# Patient Record
Sex: FEMALE | Race: OTHER | HISPANIC OR LATINO | URBAN - METROPOLITAN AREA
[De-identification: names, ages, dates, MRNs, and addresses within clinical notes are randomized per-mention and may not be internally consistent; named-entity substitution may affect disease eponyms.]

---

## 2019-04-17 ENCOUNTER — EMERGENCY (EMERGENCY)
Facility: HOSPITAL | Age: 44
LOS: 1 days | Discharge: HOME | End: 2019-04-17
Attending: EMERGENCY MEDICINE
Payer: SELF-PAY

## 2019-04-17 VITALS
DIASTOLIC BLOOD PRESSURE: 94 MMHG | HEART RATE: 76 BPM | OXYGEN SATURATION: 100 % | TEMPERATURE: 98 F | RESPIRATION RATE: 16 BRPM | SYSTOLIC BLOOD PRESSURE: 138 MMHG | WEIGHT: 119.05 LBS | HEIGHT: 60 IN

## 2019-04-17 PROCEDURE — 99053 MED SERV 10PM-8AM 24 HR FAC: CPT

## 2019-04-17 PROCEDURE — 99284 EMERGENCY DEPT VISIT MOD MDM: CPT | Mod: 25

## 2019-04-18 VITALS
OXYGEN SATURATION: 100 % | TEMPERATURE: 98 F | SYSTOLIC BLOOD PRESSURE: 126 MMHG | HEART RATE: 72 BPM | RESPIRATION RATE: 16 BRPM | DIASTOLIC BLOOD PRESSURE: 78 MMHG

## 2019-04-18 LAB
APPEARANCE UR: CLEAR — SIGNIFICANT CHANGE UP
BACTERIA # UR AUTO: NEGATIVE — SIGNIFICANT CHANGE UP
BILIRUB UR-MCNC: NEGATIVE — SIGNIFICANT CHANGE UP
C TRACH RRNA SPEC QL NAA+PROBE: SIGNIFICANT CHANGE UP
COLOR SPEC: SIGNIFICANT CHANGE UP
CULTURE RESULTS: NO GROWTH — SIGNIFICANT CHANGE UP
DIFF PNL FLD: NEGATIVE — SIGNIFICANT CHANGE UP
EPI CELLS # UR: 0 /HPF — SIGNIFICANT CHANGE UP
GLUCOSE UR QL: NEGATIVE — SIGNIFICANT CHANGE UP
HIV 1 & 2 AB SERPL IA.RAPID: SIGNIFICANT CHANGE UP
HYALINE CASTS # UR AUTO: 0 /LPF — SIGNIFICANT CHANGE UP (ref 0–2)
KETONES UR-MCNC: NEGATIVE — SIGNIFICANT CHANGE UP
LEUKOCYTE ESTERASE UR-ACNC: NEGATIVE — SIGNIFICANT CHANGE UP
N GONORRHOEA RRNA SPEC QL NAA+PROBE: SIGNIFICANT CHANGE UP
NITRITE UR-MCNC: NEGATIVE — SIGNIFICANT CHANGE UP
PH UR: 6 — SIGNIFICANT CHANGE UP (ref 5–8)
PROT UR-MCNC: NEGATIVE — SIGNIFICANT CHANGE UP
RBC CASTS # UR COMP ASSIST: 1 /HPF — SIGNIFICANT CHANGE UP (ref 0–4)
SP GR SPEC: 1.01 — SIGNIFICANT CHANGE UP (ref 1.01–1.02)
SPECIMEN SOURCE: SIGNIFICANT CHANGE UP
SPECIMEN SOURCE: SIGNIFICANT CHANGE UP
UROBILINOGEN FLD QL: NEGATIVE — SIGNIFICANT CHANGE UP
WBC UR QL: 0 /HPF — SIGNIFICANT CHANGE UP (ref 0–5)

## 2019-04-18 PROCEDURE — 81001 URINALYSIS AUTO W/SCOPE: CPT

## 2019-04-18 PROCEDURE — 87591 N.GONORRHOEAE DNA AMP PROB: CPT

## 2019-04-18 PROCEDURE — 87491 CHLMYD TRACH DNA AMP PROBE: CPT

## 2019-04-18 PROCEDURE — 99283 EMERGENCY DEPT VISIT LOW MDM: CPT

## 2019-04-18 PROCEDURE — 86703 HIV-1/HIV-2 1 RESULT ANTBDY: CPT

## 2019-04-18 PROCEDURE — 87086 URINE CULTURE/COLONY COUNT: CPT

## 2019-04-18 RX ORDER — FLUCONAZOLE 150 MG/1
150 TABLET ORAL ONCE
Qty: 0 | Refills: 0 | Status: COMPLETED | OUTPATIENT
Start: 2019-04-18 | End: 2019-04-18

## 2019-04-18 RX ADMIN — FLUCONAZOLE 150 MILLIGRAM(S): 150 TABLET ORAL at 00:52

## 2019-04-18 NOTE — ED PROVIDER NOTE - NSFOLLOWUPINSTRUCTIONS_ED_ALL_ED_FT
Refrain from sexual intercourse until your symptoms resolve. Follow up with your primary doctor within 24- 48 hours. Return to the ER promptly for any urgent concerns.

## 2019-04-18 NOTE — ED PROVIDER NOTE - CLINICAL SUMMARY MEDICAL DECISION MAKING FREE TEXT BOX
Attending Jr Corrales DO: 44 yo female presents with persistent vaginal itching for past few weeks. Seen at OSH and given diflucan, monospot and flagyl without relief. +sexually active with more than 1 male partner. Agrees to HIV testing. Will obtain ua, STD testing, likely treat empirically, dc home with outpatient follow up. Attending Jr Corrales DO: 42 yo female presents with persistent vaginal itching for past few weeks. Seen at OSH and given monospot and flagyl without relief. +sexually active with more than 1 male partner. Agrees to HIV testing. Will obtain ua, STD testing, likely treat empirically, dc home with outpatient follow up.

## 2019-04-18 NOTE — ED PROVIDER NOTE - OBJECTIVE STATEMENT
Emile Marmolejo (MD) : 42 y/o F pt with no significant PMHx c/o worsening vaginal itching and white discharge x3 weeks. Tried using Monistat OTC with no relief. Went to emergency room last week and was Rx'd metronidazole with no relief. Pt had 2-3 sexual partners in the past year. Denies fever, chills, dysuria, nausea, vomiting, hx of STD or any other complaints.  PMD: Dr. Gregory ([place])

## 2019-04-18 NOTE — ED PROVIDER NOTE - ATTENDING CONTRIBUTION TO CARE
I performed a history and physical exam of the patient and discussed their management with the resident. I reviewed the resident's note and agree with the documented findings and plan of care, except if noted below. My medical decision making and observations can be found be found below.    See MDM

## 2019-04-18 NOTE — ED ADULT NURSE NOTE - OBJECTIVE STATEMENT
Pt is a 44 yo female c/o vaginal discharge & itching for a few days now. She states that she was using OTC monistat for yeast infection to no avail.

## 2019-05-05 ENCOUNTER — EMERGENCY (EMERGENCY)
Facility: HOSPITAL | Age: 44
LOS: 1 days | Discharge: HOME | End: 2019-05-05
Attending: EMERGENCY MEDICINE
Payer: SELF-PAY

## 2019-05-05 VITALS
RESPIRATION RATE: 18 BRPM | TEMPERATURE: 98 F | OXYGEN SATURATION: 100 % | SYSTOLIC BLOOD PRESSURE: 123 MMHG | HEIGHT: 60 IN | DIASTOLIC BLOOD PRESSURE: 82 MMHG | HEART RATE: 83 BPM | WEIGHT: 119.93 LBS

## 2019-05-05 VITALS
SYSTOLIC BLOOD PRESSURE: 115 MMHG | TEMPERATURE: 97 F | RESPIRATION RATE: 18 BRPM | DIASTOLIC BLOOD PRESSURE: 78 MMHG | OXYGEN SATURATION: 100 % | HEART RATE: 70 BPM

## 2019-05-05 LAB
ALBUMIN SERPL ELPH-MCNC: 3.9 G/DL — SIGNIFICANT CHANGE UP (ref 3.3–5)
ALP SERPL-CCNC: 60 U/L — SIGNIFICANT CHANGE UP (ref 40–120)
ALT FLD-CCNC: 11 U/L — SIGNIFICANT CHANGE UP (ref 10–45)
ANION GAP SERPL CALC-SCNC: 13 MMOL/L — SIGNIFICANT CHANGE UP (ref 5–17)
APPEARANCE UR: CLEAR — SIGNIFICANT CHANGE UP
AST SERPL-CCNC: 29 U/L — SIGNIFICANT CHANGE UP (ref 10–40)
BACTERIA # UR AUTO: NEGATIVE — SIGNIFICANT CHANGE UP
BASE EXCESS BLDV CALC-SCNC: 0.9 MMOL/L — SIGNIFICANT CHANGE UP (ref -2–2)
BASOPHILS # BLD AUTO: 0.1 K/UL — SIGNIFICANT CHANGE UP (ref 0–0.2)
BASOPHILS NFR BLD AUTO: 0.8 % — SIGNIFICANT CHANGE UP (ref 0–2)
BILIRUB SERPL-MCNC: 0.3 MG/DL — SIGNIFICANT CHANGE UP (ref 0.2–1.2)
BILIRUB UR-MCNC: NEGATIVE — SIGNIFICANT CHANGE UP
BUN SERPL-MCNC: 14 MG/DL — SIGNIFICANT CHANGE UP (ref 7–23)
CA-I SERPL-SCNC: 1.21 MMOL/L — SIGNIFICANT CHANGE UP (ref 1.12–1.3)
CALCIUM SERPL-MCNC: 9 MG/DL — SIGNIFICANT CHANGE UP (ref 8.4–10.5)
CHLORIDE BLDV-SCNC: 108 MMOL/L — SIGNIFICANT CHANGE UP (ref 96–108)
CHLORIDE SERPL-SCNC: 105 MMOL/L — SIGNIFICANT CHANGE UP (ref 96–108)
CO2 BLDV-SCNC: 26 MMOL/L — SIGNIFICANT CHANGE UP (ref 22–30)
CO2 SERPL-SCNC: 21 MMOL/L — LOW (ref 22–31)
COLOR SPEC: SIGNIFICANT CHANGE UP
CREAT SERPL-MCNC: 0.37 MG/DL — LOW (ref 0.5–1.3)
DIFF PNL FLD: NEGATIVE — SIGNIFICANT CHANGE UP
EOSINOPHIL # BLD AUTO: 0.7 K/UL — HIGH (ref 0–0.5)
EOSINOPHIL NFR BLD AUTO: 8.3 % — HIGH (ref 0–6)
EPI CELLS # UR: 0 /HPF — SIGNIFICANT CHANGE UP
GAS PNL BLDV: 133 MMOL/L — LOW (ref 136–145)
GAS PNL BLDV: SIGNIFICANT CHANGE UP
GAS PNL BLDV: SIGNIFICANT CHANGE UP
GLUCOSE BLDV-MCNC: 97 MG/DL — SIGNIFICANT CHANGE UP (ref 70–99)
GLUCOSE SERPL-MCNC: 99 MG/DL — SIGNIFICANT CHANGE UP (ref 70–99)
GLUCOSE UR QL: NEGATIVE — SIGNIFICANT CHANGE UP
HCO3 BLDV-SCNC: 25 MMOL/L — SIGNIFICANT CHANGE UP (ref 21–29)
HCT VFR BLD CALC: 37.9 % — SIGNIFICANT CHANGE UP (ref 34.5–45)
HCT VFR BLDA CALC: 39 % — SIGNIFICANT CHANGE UP (ref 39–50)
HGB BLD CALC-MCNC: 12.6 G/DL — SIGNIFICANT CHANGE UP (ref 11.5–15.5)
HGB BLD-MCNC: 13.1 G/DL — SIGNIFICANT CHANGE UP (ref 11.5–15.5)
HYALINE CASTS # UR AUTO: 0 /LPF — SIGNIFICANT CHANGE UP (ref 0–2)
KETONES UR-MCNC: NEGATIVE — SIGNIFICANT CHANGE UP
LACTATE BLDV-MCNC: 1.5 MMOL/L — SIGNIFICANT CHANGE UP (ref 0.7–2)
LEUKOCYTE ESTERASE UR-ACNC: NEGATIVE — SIGNIFICANT CHANGE UP
LIDOCAIN IGE QN: 47 U/L — SIGNIFICANT CHANGE UP (ref 7–60)
LYMPHOCYTES # BLD AUTO: 2.7 K/UL — SIGNIFICANT CHANGE UP (ref 1–3.3)
LYMPHOCYTES # BLD AUTO: 33.1 % — SIGNIFICANT CHANGE UP (ref 13–44)
MCHC RBC-ENTMCNC: 28.7 PG — SIGNIFICANT CHANGE UP (ref 27–34)
MCHC RBC-ENTMCNC: 34.5 GM/DL — SIGNIFICANT CHANGE UP (ref 32–36)
MCV RBC AUTO: 83.2 FL — SIGNIFICANT CHANGE UP (ref 80–100)
MONOCYTES # BLD AUTO: 0.5 K/UL — SIGNIFICANT CHANGE UP (ref 0–0.9)
MONOCYTES NFR BLD AUTO: 6.7 % — SIGNIFICANT CHANGE UP (ref 2–14)
NEUTROPHILS # BLD AUTO: 4.1 K/UL — SIGNIFICANT CHANGE UP (ref 1.8–7.4)
NEUTROPHILS NFR BLD AUTO: 51.1 % — SIGNIFICANT CHANGE UP (ref 43–77)
NITRITE UR-MCNC: NEGATIVE — SIGNIFICANT CHANGE UP
PCO2 BLDV: 40 MMHG — SIGNIFICANT CHANGE UP (ref 35–50)
PH BLDV: 7.42 — SIGNIFICANT CHANGE UP (ref 7.35–7.45)
PH UR: 6.5 — SIGNIFICANT CHANGE UP (ref 5–8)
PLATELET # BLD AUTO: 284 K/UL — SIGNIFICANT CHANGE UP (ref 150–400)
PO2 BLDV: 47 MMHG — HIGH (ref 25–45)
POTASSIUM BLDV-SCNC: 3.5 MMOL/L — SIGNIFICANT CHANGE UP (ref 3.5–5.3)
POTASSIUM SERPL-MCNC: 4.5 MMOL/L — SIGNIFICANT CHANGE UP (ref 3.5–5.3)
POTASSIUM SERPL-SCNC: 4.5 MMOL/L — SIGNIFICANT CHANGE UP (ref 3.5–5.3)
PROT SERPL-MCNC: 7.1 G/DL — SIGNIFICANT CHANGE UP (ref 6–8.3)
PROT UR-MCNC: NEGATIVE — SIGNIFICANT CHANGE UP
RBC # BLD: 4.56 M/UL — SIGNIFICANT CHANGE UP (ref 3.8–5.2)
RBC # FLD: 13.5 % — SIGNIFICANT CHANGE UP (ref 10.3–14.5)
RBC CASTS # UR COMP ASSIST: 1 /HPF — SIGNIFICANT CHANGE UP (ref 0–4)
SAO2 % BLDV: 79 % — SIGNIFICANT CHANGE UP (ref 67–88)
SODIUM SERPL-SCNC: 139 MMOL/L — SIGNIFICANT CHANGE UP (ref 135–145)
SP GR SPEC: 1.02 — SIGNIFICANT CHANGE UP (ref 1.01–1.02)
UROBILINOGEN FLD QL: NEGATIVE — SIGNIFICANT CHANGE UP
WBC # BLD: 8 K/UL — SIGNIFICANT CHANGE UP (ref 3.8–10.5)
WBC # FLD AUTO: 8 K/UL — SIGNIFICANT CHANGE UP (ref 3.8–10.5)
WBC UR QL: 0 /HPF — SIGNIFICANT CHANGE UP (ref 0–5)

## 2019-05-05 PROCEDURE — 83690 ASSAY OF LIPASE: CPT

## 2019-05-05 PROCEDURE — 76830 TRANSVAGINAL US NON-OB: CPT | Mod: 26

## 2019-05-05 PROCEDURE — 93975 VASCULAR STUDY: CPT

## 2019-05-05 PROCEDURE — 82330 ASSAY OF CALCIUM: CPT

## 2019-05-05 PROCEDURE — 85027 COMPLETE CBC AUTOMATED: CPT

## 2019-05-05 PROCEDURE — 87086 URINE CULTURE/COLONY COUNT: CPT

## 2019-05-05 PROCEDURE — 82803 BLOOD GASES ANY COMBINATION: CPT

## 2019-05-05 PROCEDURE — 83605 ASSAY OF LACTIC ACID: CPT

## 2019-05-05 PROCEDURE — 99284 EMERGENCY DEPT VISIT MOD MDM: CPT | Mod: 25

## 2019-05-05 PROCEDURE — 93975 VASCULAR STUDY: CPT | Mod: 26

## 2019-05-05 PROCEDURE — 76705 ECHO EXAM OF ABDOMEN: CPT | Mod: 26

## 2019-05-05 PROCEDURE — 99284 EMERGENCY DEPT VISIT MOD MDM: CPT

## 2019-05-05 PROCEDURE — 76705 ECHO EXAM OF ABDOMEN: CPT

## 2019-05-05 PROCEDURE — 84295 ASSAY OF SERUM SODIUM: CPT

## 2019-05-05 PROCEDURE — 85014 HEMATOCRIT: CPT

## 2019-05-05 PROCEDURE — 82947 ASSAY GLUCOSE BLOOD QUANT: CPT

## 2019-05-05 PROCEDURE — 82435 ASSAY OF BLOOD CHLORIDE: CPT

## 2019-05-05 PROCEDURE — 80053 COMPREHEN METABOLIC PANEL: CPT

## 2019-05-05 PROCEDURE — 81001 URINALYSIS AUTO W/SCOPE: CPT

## 2019-05-05 PROCEDURE — 76830 TRANSVAGINAL US NON-OB: CPT

## 2019-05-05 PROCEDURE — 84132 ASSAY OF SERUM POTASSIUM: CPT

## 2019-05-05 RX ORDER — FLUCONAZOLE 150 MG/1
1 TABLET ORAL
Qty: 1 | Refills: 0
Start: 2019-05-05 | End: 2019-05-05

## 2019-05-05 RX ORDER — FLUCONAZOLE 150 MG/1
150 TABLET ORAL ONCE
Qty: 0 | Refills: 0 | Status: COMPLETED | OUTPATIENT
Start: 2019-05-05 | End: 2019-05-05

## 2019-05-05 RX ORDER — ACETAMINOPHEN 500 MG
650 TABLET ORAL ONCE
Qty: 0 | Refills: 0 | Status: COMPLETED | OUTPATIENT
Start: 2019-05-05 | End: 2019-05-05

## 2019-05-05 RX ADMIN — Medication 650 MILLIGRAM(S): at 19:57

## 2019-05-05 RX ADMIN — FLUCONAZOLE 150 MILLIGRAM(S): 150 TABLET ORAL at 19:57

## 2019-05-05 NOTE — ED PROVIDER NOTE - PROGRESS NOTE DETAILS
pelvic exam performed with chaperone RN Soo - white, cottage cheese - like discharge, no CMT, no adnexal tenderness.  - Seema Gallagher DO Pt feels improved pain.  Abdomen is soft and nontender on reassessment.  Pt offered a CT scan but declines at this time - she has been advised to follow up with her GYN and ot return to the ER immediately with any new or worsening symptoms.  Pt understands the ultrasound results and knows about the cyst on her left ovary. She understand that all the organs in the abdomen were not assess with the ultrasound and we cannot rule out all abdominal pathology without CT, although we believe it is unlikely that she has acute pathology due to nontender abdomen.  Pt will be sent home with PMD and GYN follow up.  - Seema Gallagher DO Attending Arlene Arredondo: pain resolved in the ed. on repeat exam abd soft and nontender. d/w pt resutls of ultrasounds. pt has appt with gyn. pt instructed to return for any worsneing pain. d/w pt obtaining ct scan as initially with right sided pain. as pain free currently and negative mcburrneys ttp less likely appendicitis. will return for any worsening pain

## 2019-05-05 NOTE — ED PROVIDER NOTE - NSFOLLOWUPINSTRUCTIONS_ED_ALL_ED_FT
- Take Tylenol 650mg every 6 hrs as needed for pain.   - Please return to the ER immediately with any new or worsening pain or any other concerning symptoms (such as fevers, nausea/vomiting, diarrhea)  - A prescription has been sent to your pharmacy - please take as directed   - Please follow up with your Primary doctor and with your GYN as scheduled.  All of your results have been included to share with your doctors.

## 2019-05-05 NOTE — ED ADULT NURSE REASSESSMENT NOTE - NS ED NURSE REASSESS COMMENT FT1
Pt received from BROOKE Mcguire in [place]. Pt at US at this time. Pt received from RN Shayla in [place]. Pt Aox3 breathing even unlabored spontaneously MD Elliott GRADY at bedside performing vaginal exam at this time.

## 2019-05-05 NOTE — ED PROVIDER NOTE - PHYSICAL EXAMINATION
Attending Arlene Arredondo: Gen: NAD, heent: atrauamtic, eomi, perrla, mmm, op pink, uvula midline, neck; nttp, no nuchal rigidity, chest: nttp, no crepitus, cv: rrr, no murmurs, lungs: ctab, abd: soft, ttp umbilical and suprapubic area, +BS, no guarding, ext: wwp, neg homans, skin: no rash, neuro: awake and alert, following commands, speech clear, sensation and strength intact, no focal deficits

## 2019-05-05 NOTE — ED PROVIDER NOTE - CLINICAL SUMMARY MEDICAL DECISION MAKING FREE TEXT BOX
TVUS, US abd, labs, urine, urine preg Attending Arlene Arredondo: 42 y/o female presenting with vaginal discharge and lower abdominal pain. pt states has been having vaginal discharge diagnosed as yeast infection. has been treated twice without improvement. no fevers or chills. no known h/o immunosuppression. upon arrival abd with mild ttp. u/s performed showing ne evidence of ovarain cyst or fibroid. on resident exam pt ith active yeast infection. unclear etiology of persistent infeciton. no evidence of sig hyperglycemia or known h/o DM. on serial exams abd soft. pt instructed to follow up o/p with GYN and pcp, has appt next few days. instructed to return for any worsening pain

## 2019-05-05 NOTE — ED PROVIDER NOTE - ATTENDING CONTRIBUTION TO CARE
Attending MD Arlene Arredondo:  I personally have seen and examined this patient.  Resident note reviewed and agree on plan of care and except where noted.  See HPI, PE, and MDM for details.

## 2019-05-05 NOTE — ED PROVIDER NOTE - OBJECTIVE STATEMENT
44 yo female with PMH of yeast infection s/p monostat, presents to the ED for abd pain, and persistent vaginal itching and discharge. Pt has an appt with GYN Griselda 3 but states the symptoms were bothering her so she came back. Also previously was on metronidazole without relief. Denies fever, chills, dysuria, flank pain, hematuria, melena, hematochezia, rectal pain, rash, joint pain, headache, CP, SOB, palpitations. Denies h/o previous STI. Previous chlamydia/ gonorrhea negatvie 4/18 in this ED. Pt endorses that since d/c she has not tried probiotics, also denies other abx or monost since last visit to [facility] ED.

## 2019-05-06 LAB
CULTURE RESULTS: SIGNIFICANT CHANGE UP
SPECIMEN SOURCE: SIGNIFICANT CHANGE UP

## 2020-06-26 ENCOUNTER — EMERGENCY (EMERGENCY)
Facility: HOSPITAL | Age: 45
LOS: 1 days | Discharge: HOME | End: 2020-06-26
Attending: EMERGENCY MEDICINE
Payer: MEDICAID

## 2020-06-26 VITALS
HEART RATE: 72 BPM | DIASTOLIC BLOOD PRESSURE: 78 MMHG | SYSTOLIC BLOOD PRESSURE: 102 MMHG | TEMPERATURE: 99 F | RESPIRATION RATE: 16 BRPM | OXYGEN SATURATION: 99 %

## 2020-06-26 VITALS
SYSTOLIC BLOOD PRESSURE: 117 MMHG | RESPIRATION RATE: 18 BRPM | TEMPERATURE: 98 F | OXYGEN SATURATION: 98 % | HEIGHT: 60 IN | HEART RATE: 91 BPM | DIASTOLIC BLOOD PRESSURE: 73 MMHG | WEIGHT: 125 LBS

## 2020-06-26 LAB
APPEARANCE UR: CLEAR — SIGNIFICANT CHANGE UP
BILIRUB UR-MCNC: NEGATIVE — SIGNIFICANT CHANGE UP
COLOR SPEC: YELLOW — SIGNIFICANT CHANGE UP
DIFF PNL FLD: NEGATIVE — SIGNIFICANT CHANGE UP
GLUCOSE UR QL: NEGATIVE — SIGNIFICANT CHANGE UP
HCG UR QL: NEGATIVE — SIGNIFICANT CHANGE UP
KETONES UR-MCNC: NEGATIVE — SIGNIFICANT CHANGE UP
LEUKOCYTE ESTERASE UR-ACNC: NEGATIVE — SIGNIFICANT CHANGE UP
NITRITE UR-MCNC: NEGATIVE — SIGNIFICANT CHANGE UP
PH UR: 5.5 — SIGNIFICANT CHANGE UP (ref 5–8)
PROT UR-MCNC: SIGNIFICANT CHANGE UP
SP GR SPEC: 1.03 — HIGH (ref 1.01–1.02)
UROBILINOGEN FLD QL: NEGATIVE — SIGNIFICANT CHANGE UP

## 2020-06-26 PROCEDURE — 82962 GLUCOSE BLOOD TEST: CPT

## 2020-06-26 PROCEDURE — 87491 CHLMYD TRACH DNA AMP PROBE: CPT

## 2020-06-26 PROCEDURE — 99284 EMERGENCY DEPT VISIT MOD MDM: CPT

## 2020-06-26 PROCEDURE — 87086 URINE CULTURE/COLONY COUNT: CPT

## 2020-06-26 PROCEDURE — 87591 N.GONORRHOEAE DNA AMP PROB: CPT

## 2020-06-26 PROCEDURE — 81025 URINE PREGNANCY TEST: CPT

## 2020-06-26 PROCEDURE — 81003 URINALYSIS AUTO W/O SCOPE: CPT

## 2020-06-26 RX ORDER — FLUCONAZOLE 150 MG/1
150 TABLET ORAL ONCE
Refills: 0 | Status: COMPLETED | OUTPATIENT
Start: 2020-06-26 | End: 2020-06-26

## 2020-06-26 RX ADMIN — FLUCONAZOLE 150 MILLIGRAM(S): 150 TABLET ORAL at 19:15

## 2020-06-26 NOTE — ED PROVIDER NOTE - NEURO NEGATIVE STATEMENT, MLM
no loss of consciousness, no gait abnormality, no headache, no sensory deficits, and no weakness.
detailed exam

## 2020-06-26 NOTE — ED PROVIDER NOTE - PATIENT PORTAL LINK FT
You can access the FollowMyHealth Patient Portal offered by [facility] by registering at the following website: [email or web address]. By joining [employer]’s FollowMyHealth portal, you will also be able to view your health information using other applications (apps) compatible with our system.

## 2020-06-26 NOTE — ED PROVIDER NOTE - NSFOLLOWUPINSTRUCTIONS_ED_ALL_ED_FT
rest and hydration. Take Fluconazole pill tomorrow. start clotrimazole vaginally tomorrow and for the next three days at bedtime. Follow up with the OBGYN clinic. Return to the ER Immediately for worsening pain, fever, vomiting or weakness.

## 2020-06-26 NOTE — ED ADULT NURSE NOTE - OBJECTIVE STATEMENT
46 y/o female PMHx yeast infection arrives to [facility] ED by with c/o vaginal pain and itching. States symptoms started about 1 month ago with itching and pain, now experiencing 10/10 pain and swelling. Used Monistat 1 day when began feeling symptomatic and no relief. Came in today due to pain and itchiness. Patient is A&Ox4. respirations are spontaneous and unlabored, breath sounds clear B/L, no chest pain, palpitations or SOB. Peripheral pulses 2+. lower bdominal pain, soft NT/ND. No n/v/d. Last BM today. LMP 5/16. Denies urinary symptoms. Denies fever/chills. No sick contacts. Skin is warm/dry and normal for race. Safety maintained.

## 2020-06-26 NOTE — ED PROVIDER NOTE - OBJECTIVE STATEMENT
44 yo F with no pertinent pmhx presenting with worsening vaginal itching. Patient states it has been going on for over one month. Patient states she first tried Monistat and then was seen here in the beginning of the month. Patient states she was given pills here with no relief. Patient states she didn't follow up with OBGYN because she doesn't have insurance. Patient states she is having vaginal itching, white cottage cheese like discharge, and swelling. Patient denies fever, abd pain, cough, nvd, dysuria, hematuria, vaginal bleeding, flank pain, and back pain.

## 2020-06-26 NOTE — ED PROVIDER NOTE - NSFOLLOWUPCLINICS_GEN_ALL_ED_FT
[facility] Gynecology and Obstetrics  Gynceology/OB  [address] [place] [address]  Phone: [phone]  Fax:   Follow Up Time: 1-3 Days

## 2020-06-26 NOTE — ED PROVIDER NOTE - ATTENDING CONTRIBUTION TO CARE
Pt with cottage cheese vaginal discharge.  No pain, fever, ab pain.  Appears well, nontoxic.  Will r/o severe diabetes, otherwise sxs treatment with diflucan and cream and outpt gyn referral.

## 2020-06-26 NOTE — ED ADULT NURSE NOTE - NSIMPLEMENTINTERV_GEN_ALL_ED
Implemented All Universal Safety Interventions:  [place] to call system. Call bell, personal items and telephone within reach. Instruct patient to call for assistance. Room bathroom lighting operational. Non-slip footwear when patient is off stretcher. Physically safe environment: no spills, clutter or unnecessary equipment. Stretcher in lowest position, wheels locked, appropriate side rails in place.

## 2020-06-26 NOTE — ED PROVIDER NOTE - CLINICAL SUMMARY MEDICAL DECISION MAKING FREE TEXT BOX
44 yo F with no pertinent pmhx presenting with worsening vaginal itching. Cottage cheese like discharge on exam. No history of DM. No CMT. Will obtain urine and fingerstick and treat for vaginitis

## 2020-06-27 LAB
C TRACH RRNA SPEC QL NAA+PROBE: SIGNIFICANT CHANGE UP
N GONORRHOEA RRNA SPEC QL NAA+PROBE: SIGNIFICANT CHANGE UP
SPECIMEN SOURCE: SIGNIFICANT CHANGE UP

## 2020-06-27 RX ORDER — FLUCONAZOLE 150 MG/1
1 TABLET ORAL
Qty: 1 | Refills: 0
Start: 2020-06-27 | End: 2020-06-27

## 2020-06-28 LAB
CULTURE RESULTS: SIGNIFICANT CHANGE UP
SPECIMEN SOURCE: SIGNIFICANT CHANGE UP

## 2020-07-07 ENCOUNTER — OUTPATIENT (OUTPATIENT)
Dept: OUTPATIENT SERVICES | Facility: HOSPITAL | Age: 45
LOS: 1 days | End: 2020-07-07
Payer: SELF-PAY

## 2020-07-07 ENCOUNTER — APPOINTMENT (OUTPATIENT)
Dept: OBGYN | Facility: CLINIC | Age: 45
End: 2020-07-07
Payer: SELF-PAY

## 2020-07-07 VITALS
WEIGHT: 126 LBS | BODY MASS INDEX: 20.99 KG/M2 | DIASTOLIC BLOOD PRESSURE: 80 MMHG | HEIGHT: 65 IN | SYSTOLIC BLOOD PRESSURE: 120 MMHG

## 2020-07-07 DIAGNOSIS — B95.1 STREPTOCOCCUS, GROUP B, AS THE CAUSE OF DISEASES CLASSIFIED ELSEWHERE: ICD-10-CM

## 2020-07-07 DIAGNOSIS — Z86.19 PERSONAL HISTORY OF OTHER INFECTIOUS AND PARASITIC DISEASES: ICD-10-CM

## 2020-07-07 DIAGNOSIS — O09.899 SUPERVISION OF OTHER HIGH RISK PREGNANCIES, UNSPECIFIED TRIMESTER: ICD-10-CM

## 2020-07-07 PROCEDURE — 99203 OFFICE O/P NEW LOW 30 MIN: CPT | Mod: NC

## 2020-07-07 PROCEDURE — G0463: CPT

## 2020-07-09 NOTE — REVIEW OF SYSTEMS
[Abdominal Pain] : abdominal pain [Pelvic Pain] : pelvic pain [Fever] : no fever [Chills] : no chills [Dyspnea] : no shortness of breath [Chest Pain] : no chest pain [Cough] : no cough [Dysuria] : no dysuria [Abn Vag Bleeding] : no abnormal vaginal bleeding [Incontinence] : no incontinence [Frequency] : no frequency [Urgency] : no urgency

## 2020-07-09 NOTE — PHYSICAL EXAM
[Awake] : awake [Alert] : alert [Soft] : soft [Oriented x3] : oriented to person, place, and time [No Lesions] : no genitalia lesions [Normal] : uterus [Pink Rugae] : pink rugae [No Bleeding] : there was no active vaginal bleeding [Uterine Adnexae] : were not tender and not enlarged [Acute Distress] : no acute distress [Tender] : non tender [FreeTextEntry6] : Normal uterus contour no masses felt in the adnexa [Discharge] : had no discharge

## 2020-07-09 NOTE — END OF VISIT
[] : Resident [FreeTextEntry3] : Agree with above, patient was seen and examined by me with the resident

## 2020-07-15 DIAGNOSIS — N76.0 ACUTE VAGINITIS: ICD-10-CM

## 2020-07-15 DIAGNOSIS — A49.1 STREPTOCOCCAL INFECTION, UNSPECIFIED SITE: ICD-10-CM

## 2020-07-21 ENCOUNTER — OUTPATIENT (OUTPATIENT)
Dept: OUTPATIENT SERVICES | Facility: HOSPITAL | Age: 45
LOS: 1 days | End: 2020-07-21
Payer: SELF-PAY

## 2020-07-21 ENCOUNTER — LABORATORY RESULT (OUTPATIENT)
Age: 45
End: 2020-07-21

## 2020-07-21 ENCOUNTER — APPOINTMENT (OUTPATIENT)
Dept: OBGYN | Facility: CLINIC | Age: 45
End: 2020-07-21
Payer: MEDICAID

## 2020-07-21 DIAGNOSIS — N76.0 ACUTE VAGINITIS: ICD-10-CM

## 2020-07-21 DIAGNOSIS — Z01.419 ENCOUNTER FOR GYNECOLOGICAL EXAMINATION (GENERAL) (ROUTINE) W/OUT ABNORMAL FINDINGS: ICD-10-CM

## 2020-07-21 DIAGNOSIS — A49.1 STREPTOCOCCAL INFECTION, UNSPECIFIED SITE: ICD-10-CM

## 2020-07-21 PROCEDURE — 87624 HPV HI-RISK TYP POOLED RSLT: CPT

## 2020-07-21 PROCEDURE — 81003 URINALYSIS AUTO W/O SCOPE: CPT

## 2020-07-21 PROCEDURE — 87591 N.GONORRHOEAE DNA AMP PROB: CPT

## 2020-07-21 PROCEDURE — G0463: CPT

## 2020-07-21 PROCEDURE — 99396 PREV VISIT EST AGE 40-64: CPT | Mod: NC

## 2020-07-21 PROCEDURE — 87491 CHLMYD TRACH DNA AMP PROBE: CPT

## 2020-07-21 NOTE — PHYSICAL EXAM
[Awake] : awake [LAD] : no lymphadenopathy [Acute Distress] : no acute distress [Alert] : alert [Mass] : no breast mass [Goiter] : no goiter [Thyroid Nodule] : no thyroid nodule [Axillary LAD] : no axillary lymphadenopathy [Nipple Discharge] : no nipple discharge [Oriented x3] : oriented to person, place, and time [Tender] : non tender [Soft] : soft [No Bleeding] : there was no active vaginal bleeding [Normal] : cervix [Uterine Adnexae] : were not tender and not enlarged

## 2020-07-21 NOTE — HISTORY OF PRESENT ILLNESS
[Good] : being in good health [6 Months Ago] : 6 months ago [Healthy Diet] : a healthy diet [Reproductive Age] : is of reproductive age [Regular Exercise] : regular exercise [Monogamous] : is monogamous [Sexually Active] : is sexually active [Definite:  ___ (Date)] : the last menstrual period was [unfilled]

## 2020-07-22 LAB
C TRACH RRNA SPEC QL NAA+PROBE: SIGNIFICANT CHANGE UP
HPV HIGH+LOW RISK DNA PNL CVX: SIGNIFICANT CHANGE UP
N GONORRHOEA RRNA SPEC QL NAA+PROBE: SIGNIFICANT CHANGE UP
SPECIMEN SOURCE: SIGNIFICANT CHANGE UP

## 2020-07-24 LAB — CYTOLOGY SPEC DOC CYTO: SIGNIFICANT CHANGE UP

## 2020-08-05 DIAGNOSIS — Z01.419 ENCOUNTER FOR GYNECOLOGICAL EXAMINATION (GENERAL) (ROUTINE) WITHOUT ABNORMAL FINDINGS: ICD-10-CM

## 2020-11-18 NOTE — ED ADULT NURSE NOTE - NSSUSCREENINGQ1_ED_ALL_ED
FAMILY HISTORY:  FH: aortic stenosis, Father  FH: CHF (congestive heart failure), Grandfather  FHx: myocardial infarction, Mother     No

## 2020-12-23 PROBLEM — Z86.19 HISTORY OF CANDIDIASIS OF VAGINA: Status: RESOLVED | Noted: 2020-07-07 | Resolved: 2020-12-23

## 2020-12-23 PROBLEM — Z01.419 ENCOUNTER FOR CERVICAL PAP SMEAR WITH PELVIC EXAM: Status: RESOLVED | Noted: 2020-07-21 | Resolved: 2020-12-23

## 2021-01-11 ENCOUNTER — OUTPATIENT (OUTPATIENT)
Dept: OUTPATIENT SERVICES | Facility: HOSPITAL | Age: 46
LOS: 1 days | End: 2021-01-11
Payer: SELF-PAY

## 2021-01-11 ENCOUNTER — APPOINTMENT (OUTPATIENT)
Dept: OBGYN | Facility: CLINIC | Age: 46
End: 2021-01-11
Payer: COMMERCIAL

## 2021-01-11 ENCOUNTER — RESULT CHARGE (OUTPATIENT)
Age: 46
End: 2021-01-11

## 2021-01-11 VITALS — SYSTOLIC BLOOD PRESSURE: 112 MMHG | DIASTOLIC BLOOD PRESSURE: 78 MMHG | BODY MASS INDEX: 21.13 KG/M2 | WEIGHT: 127 LBS

## 2021-01-11 VITALS — TEMPERATURE: 97.7 F

## 2021-01-11 DIAGNOSIS — B37.3 CANDIDIASIS OF VULVA AND VAGINA: ICD-10-CM

## 2021-01-11 DIAGNOSIS — N76.0 ACUTE VAGINITIS: ICD-10-CM

## 2021-01-11 PROCEDURE — 81025 URINE PREGNANCY TEST: CPT

## 2021-01-11 PROCEDURE — 99213 OFFICE O/P EST LOW 20 MIN: CPT | Mod: NC

## 2021-01-11 PROCEDURE — G0463: CPT

## 2021-01-11 RX ORDER — AMPICILLIN 500 MG/1
500 CAPSULE ORAL 4 TIMES DAILY
Qty: 28 | Refills: 0 | Status: COMPLETED | COMMUNITY
Start: 2020-07-09 | End: 2021-01-11

## 2021-01-11 RX ORDER — AMPICILLIN 500 MG/1
500 CAPSULE ORAL 4 TIMES DAILY
Qty: 28 | Refills: 0 | Status: COMPLETED | OUTPATIENT
Start: 2020-07-07 | End: 2021-01-11

## 2021-01-12 LAB — HCG UR QL: NEGATIVE

## 2021-01-13 NOTE — PHYSICAL EXAM
[Labia Majora] : normal [Labia Minora] : normal [Moderate] : There was moderate vaginal bleeding [Normal] : normal [Uterine Adnexae] : normal [FreeTextEntry1] : +erythema, scratching excoriations noted.  no lesions, no vesicles.

## 2021-01-13 NOTE — DISCUSSION/SUMMARY
[FreeTextEntry1] : 44yo _ yeast vaginitis\par - recurrence vs- never adequately tx\par - diflucan rx x 3 , can continue with lotrisone externally.  Discouraged use of vagisil. \par - irreg bleeding this month- preg test neg.  Will keep menstrual diary- if irreg bleeding persists, will rtc for evaluation.  Has never had irreg cycle in past. \par \par KATHRIN Lakhani  \par \par

## 2021-01-13 NOTE — HISTORY OF PRESENT ILLNESS
[FreeTextEntry1] : 44yo G2 (LMP 1/11/2021) presents complaining of recurrent yeast infection sx.  Pt was only tx with external lotrisone 12/2020 while concurrently tx with Amox.  Pt states itching and labial swelling returned/ worse last week with thick white d/c.  Attempted monistat with minimal relief, then used vagisil which caused burning.\par Denies blisters/ burning.  Reports irreg menses this month-  current menses only 2 weeks after LMP.   Prior reports reg q 28 day cycles.  Denies pelvic pain. \par \par - PAP 7/2020 NEG/ HPV neg\par

## 2021-03-19 ENCOUNTER — NON-APPOINTMENT (OUTPATIENT)
Age: 46
End: 2021-03-19

## 2021-03-22 ENCOUNTER — APPOINTMENT (OUTPATIENT)
Dept: INTERNAL MEDICINE | Facility: CLINIC | Age: 46
End: 2021-03-22
Payer: MEDICAID

## 2021-03-22 ENCOUNTER — OUTPATIENT (OUTPATIENT)
Dept: OUTPATIENT SERVICES | Facility: HOSPITAL | Age: 46
LOS: 1 days | End: 2021-03-22
Payer: SELF-PAY

## 2021-03-22 VITALS
OXYGEN SATURATION: 98 % | DIASTOLIC BLOOD PRESSURE: 70 MMHG | BODY MASS INDEX: 20.66 KG/M2 | HEIGHT: 65 IN | WEIGHT: 124 LBS | SYSTOLIC BLOOD PRESSURE: 110 MMHG | HEART RATE: 92 BPM

## 2021-03-22 DIAGNOSIS — Z63.5 DISRUPTION OF FAMILY BY SEPARATION AND DIVORCE: ICD-10-CM

## 2021-03-22 DIAGNOSIS — Z78.9 OTHER SPECIFIED HEALTH STATUS: ICD-10-CM

## 2021-03-22 DIAGNOSIS — I10 ESSENTIAL (PRIMARY) HYPERTENSION: ICD-10-CM

## 2021-03-22 DIAGNOSIS — Z80.0 FAMILY HISTORY OF MALIGNANT NEOPLASM OF DIGESTIVE ORGANS: ICD-10-CM

## 2021-03-22 DIAGNOSIS — Z87.891 PERSONAL HISTORY OF NICOTINE DEPENDENCE: ICD-10-CM

## 2021-03-22 DIAGNOSIS — K64.9 UNSPECIFIED HEMORRHOIDS: ICD-10-CM

## 2021-03-22 DIAGNOSIS — Z87.19 PERSONAL HISTORY OF OTHER DISEASES OF THE DIGESTIVE SYSTEM: ICD-10-CM

## 2021-03-22 PROCEDURE — 99205 OFFICE O/P NEW HI 60 MIN: CPT | Mod: GC

## 2021-03-22 PROCEDURE — 90688 IIV4 VACCINE SPLT 0.5 ML IM: CPT

## 2021-03-22 PROCEDURE — 90715 TDAP VACCINE 7 YRS/> IM: CPT

## 2021-03-22 PROCEDURE — G0463: CPT | Mod: 25

## 2021-03-22 PROCEDURE — G0008: CPT

## 2021-03-22 PROCEDURE — 90471 IMMUNIZATION ADMIN: CPT

## 2021-03-22 SDOH — SOCIAL STABILITY - SOCIAL INSECURITY: DISRUPTION OF FAMILY BY SEPARATION AND DIVORCE: Z63.5

## 2021-03-22 NOTE — PLAN
[FreeTextEntry1] : #hemorrhoids: 1 external hemorrhoid noted, no internal hemorrhoids noted on exam.\par - advised pt to drink plenty of water, exercise. limit sleeping. c/w fiber, miralax, senna prn to avoid constipation/straining.\par - prescribed hydrocortisone cream for itching\par - prescribed sitz bath\par - GI referral given for constipation, hemorrhoids with fam hx stomach ca\par \par #HCM:\par - mammogram referral given\par - influenza, Tdap vaccines ordered\par - f/u labs: A1c, TSH, CBC, CMP, lipid profile, titers\par - advised pt to get COVID vaccine when able- given sheet with more info.\par \par RTC after GI appt or earlier prn\par Case discussed with Dr. Mata\par Jarrett Matute, PGY-1

## 2021-03-22 NOTE — PHYSICAL EXAM
[Normal] : affect was normal and insight and judgment were intact [FreeTextEntry1] : +anterior external hemorrhoid, no discharge, bleeding. no anal fissure noted. No TTp or inflammatory changes noted.

## 2021-03-22 NOTE — HISTORY OF PRESENT ILLNESS
[FreeTextEntry1] : hemorrhoids [de-identified] : 45y F PMHx hemorrhoids p/w rectal itching x3 months. Pt notes she is from [place] and got a hemorrhoid procedure in [place] 6 years ago- unclear if internal or external hemorrhoids. Pt notes she was asx until about 3 months ago when she started having some rectal itching. She is using daily metamucil, preparation H without much relief. Denies using sitz bath or steroid cream. Notes she is not constipated but takes the metamucil daily. Notes very infrequent specks of blood around stool. Works as a  so is active throughout the day. Notes 1 male sexual partner- does not use protection, no concern for STI, no h/o STI, and does not engage in anal intercourse. Of note, pt notes her mother had stomach ca and her GYN referred her to GI last year but she did not go due the pandemic- would like the referral again. Notes social drinking and quit smoking 5 months ago.

## 2021-03-23 DIAGNOSIS — K64.9 UNSPECIFIED HEMORRHOIDS: ICD-10-CM

## 2021-03-23 DIAGNOSIS — Z63.5 DISRUPTION OF FAMILY BY SEPARATION AND DIVORCE: ICD-10-CM

## 2021-03-23 DIAGNOSIS — Z23 ENCOUNTER FOR IMMUNIZATION: ICD-10-CM

## 2021-03-23 DIAGNOSIS — Z87.19 PERSONAL HISTORY OF OTHER DISEASES OF THE DIGESTIVE SYSTEM: ICD-10-CM

## 2021-03-23 DIAGNOSIS — Z80.0 FAMILY HISTORY OF MALIGNANT NEOPLASM OF DIGESTIVE ORGANS: ICD-10-CM

## 2021-03-23 DIAGNOSIS — Z87.891 PERSONAL HISTORY OF NICOTINE DEPENDENCE: ICD-10-CM

## 2021-03-23 DIAGNOSIS — Z78.9 OTHER SPECIFIED HEALTH STATUS: ICD-10-CM

## 2021-03-23 SDOH — SOCIAL STABILITY - SOCIAL INSECURITY: DISRUPTION OF FAMILY BY SEPARATION AND DIVORCE: Z63.5

## 2021-04-01 ENCOUNTER — NON-APPOINTMENT (OUTPATIENT)
Age: 46
End: 2021-04-01

## 2021-04-01 ENCOUNTER — RESULT REVIEW (OUTPATIENT)
Age: 46
End: 2021-04-01

## 2021-04-01 ENCOUNTER — OUTPATIENT (OUTPATIENT)
Dept: OUTPATIENT SERVICES | Facility: HOSPITAL | Age: 46
LOS: 1 days | End: 2021-04-01
Payer: MEDICAID

## 2021-04-01 ENCOUNTER — APPOINTMENT (OUTPATIENT)
Dept: MAMMOGRAPHY | Facility: IMAGING CENTER | Age: 46
End: 2021-04-01
Payer: MEDICAID

## 2021-04-01 DIAGNOSIS — Z00.00 ENCOUNTER FOR GENERAL ADULT MEDICAL EXAMINATION WITHOUT ABNORMAL FINDINGS: ICD-10-CM

## 2021-04-01 PROCEDURE — 77067 SCR MAMMO BI INCL CAD: CPT

## 2021-04-01 PROCEDURE — 77063 BREAST TOMOSYNTHESIS BI: CPT

## 2021-04-01 PROCEDURE — 77067 SCR MAMMO BI INCL CAD: CPT | Mod: 26

## 2021-04-01 PROCEDURE — 77063 BREAST TOMOSYNTHESIS BI: CPT | Mod: 26

## 2021-04-05 ENCOUNTER — APPOINTMENT (OUTPATIENT)
Dept: MAMMOGRAPHY | Facility: IMAGING CENTER | Age: 46
End: 2021-04-05
Payer: MEDICAID

## 2021-04-05 ENCOUNTER — APPOINTMENT (OUTPATIENT)
Dept: ULTRASOUND IMAGING | Facility: IMAGING CENTER | Age: 46
End: 2021-04-05
Payer: MEDICAID

## 2021-04-05 ENCOUNTER — RESULT REVIEW (OUTPATIENT)
Age: 46
End: 2021-04-05

## 2021-04-05 ENCOUNTER — OUTPATIENT (OUTPATIENT)
Dept: OUTPATIENT SERVICES | Facility: HOSPITAL | Age: 46
LOS: 1 days | End: 2021-04-05
Payer: SELF-PAY

## 2021-04-05 DIAGNOSIS — Z00.8 ENCOUNTER FOR OTHER GENERAL EXAMINATION: ICD-10-CM

## 2021-04-05 PROCEDURE — G0279: CPT

## 2021-04-05 PROCEDURE — 76642 ULTRASOUND BREAST LIMITED: CPT | Mod: 26,50

## 2021-04-05 PROCEDURE — 77065 DX MAMMO INCL CAD UNI: CPT | Mod: 26,LT

## 2021-04-05 PROCEDURE — G0279: CPT | Mod: 26

## 2021-04-05 PROCEDURE — 77065 DX MAMMO INCL CAD UNI: CPT

## 2021-04-05 PROCEDURE — 76642 ULTRASOUND BREAST LIMITED: CPT

## 2021-05-04 ENCOUNTER — APPOINTMENT (OUTPATIENT)
Dept: GASTROENTEROLOGY | Facility: HOSPITAL | Age: 46
End: 2021-05-04

## 2021-08-06 ENCOUNTER — EMERGENCY (EMERGENCY)
Facility: HOSPITAL | Age: 46
LOS: 1 days | Discharge: HOME | End: 2021-08-06
Attending: STUDENT IN AN ORGANIZED HEALTH CARE EDUCATION/TRAINING PROGRAM
Payer: MEDICAID

## 2021-08-06 VITALS
DIASTOLIC BLOOD PRESSURE: 68 MMHG | TEMPERATURE: 98 F | OXYGEN SATURATION: 100 % | HEART RATE: 80 BPM | SYSTOLIC BLOOD PRESSURE: 107 MMHG | RESPIRATION RATE: 18 BRPM

## 2021-08-06 VITALS
SYSTOLIC BLOOD PRESSURE: 116 MMHG | RESPIRATION RATE: 18 BRPM | HEART RATE: 84 BPM | DIASTOLIC BLOOD PRESSURE: 75 MMHG | TEMPERATURE: 98 F | OXYGEN SATURATION: 97 % | WEIGHT: 123.9 LBS | HEIGHT: 60 IN

## 2021-08-06 LAB
HCT VFR BLD CALC: 35.6 % — SIGNIFICANT CHANGE UP (ref 34.5–45)
HGB BLD-MCNC: 11.4 G/DL — LOW (ref 11.5–15.5)
MCHC RBC-ENTMCNC: 27.1 PG — SIGNIFICANT CHANGE UP (ref 27–34)
MCHC RBC-ENTMCNC: 32 GM/DL — SIGNIFICANT CHANGE UP (ref 32–36)
MCV RBC AUTO: 84.8 FL — SIGNIFICANT CHANGE UP (ref 80–100)
NRBC # BLD: 0 /100 WBCS — SIGNIFICANT CHANGE UP (ref 0–0)
OB PNL STL: NEGATIVE — SIGNIFICANT CHANGE UP
PLATELET # BLD AUTO: 270 K/UL — SIGNIFICANT CHANGE UP (ref 150–400)
RBC # BLD: 4.2 M/UL — SIGNIFICANT CHANGE UP (ref 3.8–5.2)
RBC # FLD: 14.8 % — HIGH (ref 10.3–14.5)
WBC # BLD: 7.48 K/UL — SIGNIFICANT CHANGE UP (ref 3.8–10.5)
WBC # FLD AUTO: 7.48 K/UL — SIGNIFICANT CHANGE UP (ref 3.8–10.5)

## 2021-08-06 PROCEDURE — 99283 EMERGENCY DEPT VISIT LOW MDM: CPT

## 2021-08-06 PROCEDURE — 85027 COMPLETE CBC AUTOMATED: CPT

## 2021-08-06 PROCEDURE — 99284 EMERGENCY DEPT VISIT MOD MDM: CPT

## 2021-08-06 PROCEDURE — 82272 OCCULT BLD FECES 1-3 TESTS: CPT

## 2021-08-06 NOTE — ED PROVIDER NOTE - OBJECTIVE STATEMENT
46y F with no PMHx of, presenting with 2 episodes of blood in stool. Pt had constipation over the last few weeks. Pt started taking Metamucil and eating prunes over the last few weeks.  Now noticing bowels but notes some blood in stool last night and this morning. Denies abd pain, f/c, n/v. Reported feeling lightheaded last night but currently denies. No cp, no sob, no dizziness. 46y F with no PMHx of, presenting with 2 episodes of blood in stool. Pt had constipation over the last few weeks. Pt started taking Metamucil and eating prunes over the last few weeks.  Now moving bowels but notes some blood in stool last night and this morning. Denies abd pain, f/c, n/v. Reported feeling lightheaded last night but currently denies. No cp, no sob, no dizziness.

## 2021-08-06 NOTE — ED PROVIDER NOTE - PHYSICAL EXAMINATION
Seema Gallagher (DO):     GEN: well appearing, NAD  HENT: normal conjunctiva  RESP: CTAB  CARDIAC: RRR  ABD: soft, ntnd. no cvat  RECTUM: brown stool in rectum. No hemorrhoids or fissures. Seema Gallagher (DO):     GEN: well appearing, NAD  HENT: normal conjunctiva  RESP: CTAB  CARDIAC: RRR  ABD: soft, ntnd. no cvat  RECTUM: brown stool in rectum. No hemorrhoids or fissures. - Chaperoned by Nurse Medina. Seema Gallagher (DO):     GEN: well appearing, NAD  HENT: normal conjunctiva, PERRL  RESP: CTAB  CARDIAC: RRR  ABD: soft, ntnd. no cvat  RECTUM: brown stool in rectum. No hemorrhoids or fissures. - Chaperoned by Nurse Medina.  SKIN: No rash

## 2021-08-06 NOTE — ED ADULT NURSE NOTE - OBJECTIVE STATEMENT
46 year old female with no PMH presents to the ED complaining of bloody stool x 1 day and abdominal pain (bilateral/medial). Patient states she has been constipated and using prune juice to have bowel movements, last BM earlier today, normal color/consistency, with spots of blood in toilet. Patient denies lightheadedness, SOB, chest pain, nausea, vomiting, diarrhea, dysuria. Patient states last menstrual cycle ended 1 week ago.

## 2021-08-06 NOTE — ED PROVIDER NOTE - PATIENT PORTAL LINK FT
You can access the FollowMyHealth Patient Portal offered by [facility] by registering at the following website: [email or web address]. By joining [employer]’s FollowMyHealth portal, you will also be able to view your health information using other applications (apps) compatible with our system.

## 2021-08-06 NOTE — ED PROVIDER NOTE - CLINICAL SUMMARY MEDICAL DECISION MAKING FREE TEXT BOX
Seema Gallagher (DO): 46y F, well appearing, presenting with 2 episodes of blood in stool. Non tender on abd exam and normal vital signs. Due to episode of lightheadedness will get CBC and guaiac but will likely be appropriate for out pt GI follow up.

## 2021-08-06 NOTE — ED PROVIDER NOTE - NSFOLLOWUPINSTRUCTIONS_ED_ALL_ED_FT
You were evaluated in the emergency department for constipation and blood in stool.     You should follow up with a gastroenterologist if your symptoms persist. You should be sure to drink lots of water and follow a high fiber diet. Continue with your Metmucil.    Please return to the emergency department with any new or worsening symptoms, including:  -Severe abdominal pain  -High fevers  -Lots of blood in stool  -You faint  -Nausea and vomiting    Ghanshyam Fishman (DO)  Gastroenterology; Internal Medicine  [address] [place] [address]  Phone: [phone]  Fax: [phone]    Miguel Angel Russ (MD)  ColonRectal Surgery; Surgery  [facility] [place] [address]  Phone: [phone]  Fax: [phone]    Constipation    WHAT YOU NEED TO KNOW:    Constipation is when you have hard, dry bowel movements, or you go longer than usual between bowel movements.    DISCHARGE INSTRUCTIONS:    Call your doctor if:    You have blood in your bowel movements.    You have a fever and abdominal pain with the constipation.    Your constipation gets worse.    You start to vomit.    You have questions or concerns about your condition or care.  Medicines:    Medicine such as a laxative may help relax and loosen your intestines to help you have a bowel movement. Your provider may recommend you only use laxatives for a short time. Long-term use may make your bowels dependent on the medicine.    Take your medicine as directed. Contact your healthcare provider if you think your medicine is not helping or if you have side effects. Tell him of her if you are allergic to any medicine. Keep a list of the medicines, vitamins, and herbs you take. Include the amounts, and when and why you take them. Bring the list or the pill bottles to follow-up visits. Carry your medicine list with you in case of an emergency.  Relieve constipation:    A suppository may be used to help soften your bowel movements. This may make them easier to pass. A suppository is guided into your rectum through your anus.  Suppository for Constipation      An enema is liquid medicine used to clear bowel movement from your rectum. The medicine is put into your rectum through your anus.  Enemas  Prevent constipation:    Drink liquids as directed. You may need to drink extra liquids to help soften and move your bowels. Ask how much liquid to drink each day and which liquids are best for you.    Eat high-fiber foods. This may help decrease constipation by adding bulk to your bowel movements. High-fiber foods include fruit, vegetables, whole-grain breads and cereals, and beans. Your healthcare provider or dietitian can help you create a high-fiber meal plan. Your provider may also recommend a fiber supplement if you cannot get enough fiber from food.        Exercise regularly. Regular physical activity can help stimulate your intestines. Walking is a good exercise to prevent or relieve constipation. Ask which exercises are best for you.  Walking for Exercise      Schedule a time each day to have a bowel movement. This may help train your body to have regular bowel movements. Bend forward while you are on the toilet to help move the bowel movement out. Sit on the toilet for at least 10 minutes, even if you do not have a bowel movement.    Talk to your healthcare provider about your medicines. Certain medicines, such as opioids, can cause constipation. Your provider may be able to make medicine changes. For example, he or she may change the kind of medicine, or change when you take it.  Follow up with your healthcare provider as directed: Write down your questions so you remember to ask them during your visits.

## 2021-08-16 ENCOUNTER — INPATIENT (INPATIENT)
Facility: HOSPITAL | Age: 46
LOS: 2 days | Discharge: HOME | DRG: 872 | End: 2021-08-19
Attending: INTERNAL MEDICINE | Admitting: STUDENT IN AN ORGANIZED HEALTH CARE EDUCATION/TRAINING PROGRAM
Payer: MEDICAID

## 2021-08-16 ENCOUNTER — NON-APPOINTMENT (OUTPATIENT)
Age: 46
End: 2021-08-16

## 2021-08-16 VITALS
RESPIRATION RATE: 16 BRPM | WEIGHT: 123.9 LBS | TEMPERATURE: 102 F | DIASTOLIC BLOOD PRESSURE: 77 MMHG | HEIGHT: 60 IN | OXYGEN SATURATION: 98 % | SYSTOLIC BLOOD PRESSURE: 114 MMHG | HEART RATE: 100 BPM

## 2021-08-16 LAB
ALBUMIN SERPL ELPH-MCNC: 4.3 G/DL — SIGNIFICANT CHANGE UP (ref 3.3–5)
ALP SERPL-CCNC: 73 U/L — SIGNIFICANT CHANGE UP (ref 40–120)
ALT FLD-CCNC: 9 U/L — LOW (ref 10–45)
ANION GAP SERPL CALC-SCNC: 18 MMOL/L — HIGH (ref 5–17)
APPEARANCE UR: ABNORMAL
AST SERPL-CCNC: 23 U/L — SIGNIFICANT CHANGE UP (ref 10–40)
BACTERIA # UR AUTO: ABNORMAL
BASOPHILS # BLD AUTO: 0.03 K/UL — SIGNIFICANT CHANGE UP (ref 0–0.2)
BASOPHILS NFR BLD AUTO: 0.2 % — SIGNIFICANT CHANGE UP (ref 0–2)
BILIRUB SERPL-MCNC: 0.7 MG/DL — SIGNIFICANT CHANGE UP (ref 0.2–1.2)
BILIRUB UR-MCNC: NEGATIVE — SIGNIFICANT CHANGE UP
BUN SERPL-MCNC: 10 MG/DL — SIGNIFICANT CHANGE UP (ref 7–23)
CA-I SERPL-SCNC: 1.07 MMOL/L — LOW (ref 1.15–1.33)
CALCIUM SERPL-MCNC: 9.6 MG/DL — SIGNIFICANT CHANGE UP (ref 8.4–10.5)
CHLORIDE BLDV-SCNC: 106 MMOL/L — SIGNIFICANT CHANGE UP (ref 96–108)
CHLORIDE SERPL-SCNC: 100 MMOL/L — SIGNIFICANT CHANGE UP (ref 96–108)
CO2 BLDV-SCNC: 24 MMOL/L — SIGNIFICANT CHANGE UP (ref 22–26)
CO2 SERPL-SCNC: 15 MMOL/L — LOW (ref 22–31)
COLOR SPEC: YELLOW — SIGNIFICANT CHANGE UP
CREAT SERPL-MCNC: 0.46 MG/DL — LOW (ref 0.5–1.3)
DIFF PNL FLD: ABNORMAL
EOSINOPHIL # BLD AUTO: 0.01 K/UL — SIGNIFICANT CHANGE UP (ref 0–0.5)
EOSINOPHIL NFR BLD AUTO: 0.1 % — SIGNIFICANT CHANGE UP (ref 0–6)
EPI CELLS # UR: 2 /HPF — SIGNIFICANT CHANGE UP
GAS PNL BLDV: 136 MMOL/L — SIGNIFICANT CHANGE UP (ref 136–145)
GAS PNL BLDV: SIGNIFICANT CHANGE UP
GLUCOSE BLDV-MCNC: 111 MG/DL — HIGH (ref 70–99)
GLUCOSE SERPL-MCNC: 105 MG/DL — HIGH (ref 70–99)
GLUCOSE UR QL: NEGATIVE — SIGNIFICANT CHANGE UP
HCO3 BLDV-SCNC: 23 MMOL/L — SIGNIFICANT CHANGE UP (ref 22–29)
HCT VFR BLD CALC: 41.2 % — SIGNIFICANT CHANGE UP (ref 34.5–45)
HCT VFR BLDA CALC: 32 % — LOW (ref 34.5–46.5)
HGB BLD CALC-MCNC: 10.5 G/DL — LOW (ref 11.7–16.1)
HGB BLD-MCNC: 13.1 G/DL — SIGNIFICANT CHANGE UP (ref 11.5–15.5)
HYALINE CASTS # UR AUTO: 4 /LPF — HIGH (ref 0–2)
IMM GRANULOCYTES NFR BLD AUTO: 0.3 % — SIGNIFICANT CHANGE UP (ref 0–1.5)
KETONES UR-MCNC: NEGATIVE — SIGNIFICANT CHANGE UP
LACTATE BLDV-MCNC: 1.8 MMOL/L — SIGNIFICANT CHANGE UP (ref 0.7–2)
LACTATE BLDV-MCNC: 2.2 MMOL/L — HIGH (ref 0.7–2)
LEUKOCYTE ESTERASE UR-ACNC: ABNORMAL
LYMPHOCYTES # BLD AUTO: 1.05 K/UL — SIGNIFICANT CHANGE UP (ref 1–3.3)
LYMPHOCYTES # BLD AUTO: 8.7 % — LOW (ref 13–44)
MCHC RBC-ENTMCNC: 27.2 PG — SIGNIFICANT CHANGE UP (ref 27–34)
MCHC RBC-ENTMCNC: 31.8 GM/DL — LOW (ref 32–36)
MCV RBC AUTO: 85.7 FL — SIGNIFICANT CHANGE UP (ref 80–100)
MONOCYTES # BLD AUTO: 0.1 K/UL — SIGNIFICANT CHANGE UP (ref 0–0.9)
MONOCYTES NFR BLD AUTO: 0.8 % — LOW (ref 2–14)
NEUTROPHILS # BLD AUTO: 10.85 K/UL — HIGH (ref 1.8–7.4)
NEUTROPHILS NFR BLD AUTO: 89.9 % — HIGH (ref 43–77)
NITRITE UR-MCNC: NEGATIVE — SIGNIFICANT CHANGE UP
NRBC # BLD: 0 /100 WBCS — SIGNIFICANT CHANGE UP (ref 0–0)
PCO2 BLDV: 37 MMHG — LOW (ref 39–42)
PH BLDV: 7.4 — SIGNIFICANT CHANGE UP (ref 7.32–7.43)
PH UR: 6 — SIGNIFICANT CHANGE UP (ref 5–8)
PLATELET # BLD AUTO: 213 K/UL — SIGNIFICANT CHANGE UP (ref 150–400)
PO2 BLDV: 60 MMHG — HIGH (ref 25–45)
POTASSIUM BLDV-SCNC: 3.1 MMOL/L — LOW (ref 3.5–5.1)
POTASSIUM SERPL-MCNC: 4.9 MMOL/L — SIGNIFICANT CHANGE UP (ref 3.5–5.3)
POTASSIUM SERPL-SCNC: 4.9 MMOL/L — SIGNIFICANT CHANGE UP (ref 3.5–5.3)
PROT SERPL-MCNC: 8 G/DL — SIGNIFICANT CHANGE UP (ref 6–8.3)
PROT UR-MCNC: ABNORMAL
RBC # BLD: 4.81 M/UL — SIGNIFICANT CHANGE UP (ref 3.8–5.2)
RBC # FLD: 15 % — HIGH (ref 10.3–14.5)
RBC CASTS # UR COMP ASSIST: 2 /HPF — SIGNIFICANT CHANGE UP (ref 0–4)
SAO2 % BLDV: 90.3 % — HIGH (ref 67–88)
SARS-COV-2 RNA SPEC QL NAA+PROBE: SIGNIFICANT CHANGE UP
SODIUM SERPL-SCNC: 133 MMOL/L — LOW (ref 135–145)
SP GR SPEC: 1.02 — SIGNIFICANT CHANGE UP (ref 1.01–1.02)
UROBILINOGEN FLD QL: NEGATIVE — SIGNIFICANT CHANGE UP
WBC # BLD: 12.08 K/UL — HIGH (ref 3.8–10.5)
WBC # FLD AUTO: 12.08 K/UL — HIGH (ref 3.8–10.5)
WBC UR QL: 127 /HPF — HIGH (ref 0–5)

## 2021-08-16 RX ORDER — KETOROLAC TROMETHAMINE 30 MG/ML
15 SYRINGE (ML) INJECTION ONCE
Refills: 0 | Status: DISCONTINUED | OUTPATIENT
Start: 2021-08-16 | End: 2021-08-16

## 2021-08-16 RX ORDER — ACETAMINOPHEN 500 MG
650 TABLET ORAL EVERY 6 HOURS
Refills: 0 | Status: DISCONTINUED | OUTPATIENT
Start: 2021-08-16 | End: 2021-08-19

## 2021-08-16 RX ORDER — SODIUM CHLORIDE 9 MG/ML
1000 INJECTION INTRAMUSCULAR; INTRAVENOUS; SUBCUTANEOUS
Refills: 0 | Status: DISCONTINUED | OUTPATIENT
Start: 2021-08-16 | End: 2021-08-17

## 2021-08-16 RX ORDER — MORPHINE SULFATE 50 MG/1
4 CAPSULE, EXTENDED RELEASE ORAL ONCE
Refills: 0 | Status: DISCONTINUED | OUTPATIENT
Start: 2021-08-16 | End: 2021-08-16

## 2021-08-16 RX ORDER — SODIUM CHLORIDE 9 MG/ML
1000 INJECTION INTRAMUSCULAR; INTRAVENOUS; SUBCUTANEOUS ONCE
Refills: 0 | Status: COMPLETED | OUTPATIENT
Start: 2021-08-16 | End: 2021-08-16

## 2021-08-16 RX ORDER — ONDANSETRON 8 MG/1
4 TABLET, FILM COATED ORAL ONCE
Refills: 0 | Status: COMPLETED | OUTPATIENT
Start: 2021-08-16 | End: 2021-08-16

## 2021-08-16 RX ORDER — SODIUM CHLORIDE 9 MG/ML
1000 INJECTION INTRAMUSCULAR; INTRAVENOUS; SUBCUTANEOUS
Refills: 0 | Status: DISCONTINUED | OUTPATIENT
Start: 2021-08-16 | End: 2021-08-16

## 2021-08-16 RX ORDER — CEFTRIAXONE 500 MG/1
1000 INJECTION, POWDER, FOR SOLUTION INTRAMUSCULAR; INTRAVENOUS ONCE
Refills: 0 | Status: COMPLETED | OUTPATIENT
Start: 2021-08-16 | End: 2021-08-16

## 2021-08-16 RX ORDER — CEFTRIAXONE 500 MG/1
1000 INJECTION, POWDER, FOR SOLUTION INTRAMUSCULAR; INTRAVENOUS EVERY 12 HOURS
Refills: 0 | Status: DISCONTINUED | OUTPATIENT
Start: 2021-08-16 | End: 2021-08-19

## 2021-08-16 RX ORDER — ACETAMINOPHEN 500 MG
975 TABLET ORAL ONCE
Refills: 0 | Status: COMPLETED | OUTPATIENT
Start: 2021-08-16 | End: 2021-08-16

## 2021-08-16 RX ADMIN — Medication 15 MILLIGRAM(S): at 11:37

## 2021-08-16 RX ADMIN — Medication 15 MILLIGRAM(S): at 21:16

## 2021-08-16 RX ADMIN — SODIUM CHLORIDE 1000 MILLILITER(S): 9 INJECTION INTRAMUSCULAR; INTRAVENOUS; SUBCUTANEOUS at 12:32

## 2021-08-16 RX ADMIN — MORPHINE SULFATE 4 MILLIGRAM(S): 50 CAPSULE, EXTENDED RELEASE ORAL at 12:11

## 2021-08-16 RX ADMIN — Medication 975 MILLIGRAM(S): at 16:36

## 2021-08-16 RX ADMIN — SODIUM CHLORIDE 1000 MILLILITER(S): 9 INJECTION INTRAMUSCULAR; INTRAVENOUS; SUBCUTANEOUS at 14:36

## 2021-08-16 RX ADMIN — CEFTRIAXONE 1000 MILLIGRAM(S): 500 INJECTION, POWDER, FOR SOLUTION INTRAMUSCULAR; INTRAVENOUS at 14:36

## 2021-08-16 RX ADMIN — Medication 975 MILLIGRAM(S): at 15:39

## 2021-08-16 RX ADMIN — SODIUM CHLORIDE 75 MILLILITER(S): 9 INJECTION INTRAMUSCULAR; INTRAVENOUS; SUBCUTANEOUS at 21:19

## 2021-08-16 RX ADMIN — Medication 15 MILLIGRAM(S): at 21:45

## 2021-08-16 RX ADMIN — MORPHINE SULFATE 4 MILLIGRAM(S): 50 CAPSULE, EXTENDED RELEASE ORAL at 12:32

## 2021-08-16 RX ADMIN — Medication 15 MILLIGRAM(S): at 12:06

## 2021-08-16 RX ADMIN — SODIUM CHLORIDE 75 MILLILITER(S): 9 INJECTION INTRAMUSCULAR; INTRAVENOUS; SUBCUTANEOUS at 15:39

## 2021-08-16 RX ADMIN — ONDANSETRON 4 MILLIGRAM(S): 8 TABLET, FILM COATED ORAL at 11:37

## 2021-08-16 RX ADMIN — SODIUM CHLORIDE 1000 MILLILITER(S): 9 INJECTION INTRAMUSCULAR; INTRAVENOUS; SUBCUTANEOUS at 11:37

## 2021-08-16 RX ADMIN — CEFTRIAXONE 100 MILLIGRAM(S): 500 INJECTION, POWDER, FOR SOLUTION INTRAMUSCULAR; INTRAVENOUS at 14:31

## 2021-08-16 RX ADMIN — SODIUM CHLORIDE 150 MILLILITER(S): 9 INJECTION INTRAMUSCULAR; INTRAVENOUS; SUBCUTANEOUS at 22:50

## 2021-08-16 NOTE — ED PROVIDER NOTE - BIRTH SEX
Female
No chest pain, palpitations, sob, light headedness/dizziness, difficulty breathing/cough, fevers/chills.All other ROS negative

## 2021-08-16 NOTE — ED PROVIDER NOTE - PROGRESS NOTE DETAILS
Patient is reassessed, states feeling much better at this time. RGUJRAL Pt's UA and VBG dropped off to the Lab. Lab states they did not receive the specimen. Will send UA the 3rd time and repeat VBG. JOESLUISUJJAVED CT consistent with R-sided pyelo. Pt reports she is feeling slightly better than when she came to ED, but still with discomfort. Reports she is hungry and wants to try to eat something.  Given persistent tachycardia and symptoms, plan to keep pt in obs for IVF, pain/fever control, advance diet, and abx. Pt amenable to plan. - Janiya Griffin PA-C

## 2021-08-16 NOTE — ED CDU PROVIDER INITIAL DAY NOTE - ATTENDING CONTRIBUTION TO CARE
Patient is a 45 yo F who presented with dysuria x 1 week associated with fever/ chills and 3 days of right flank pain. + nausea, vomiting. ED Work up showed UTI and CT consistent with right sided pyelonephritis.   VS noted  Gen. no acute distress, Non toxic   HEENT: EOMI, mmm  Lungs: CTAB/L no C/ W /R   CVS: RRR   Abd; Soft, ttp in RLQ, mild right CVA tenderness  Ext: no edema  Skin: no rash  Neuro AAOx3 non focal clear speech  a/p: pyelonephritis - Patient in CDU for pain control, IV antibiotics and reassessment.   - Pravin WHARTON

## 2021-08-16 NOTE — ED ADULT NURSE NOTE - NSIMPLEMENTINTERV_GEN_ALL_ED
Implemented All Universal Safety Interventions:  [place] to call system. Call bell, personal items and telephone within reach. Instruct patient to call for assistance. Room bathroom lighting operational. Non-slip footwear when patient is off stretcher. Physically safe environment: no spills, clutter or unnecessary equipment. Stretcher in lowest position, wheels locked, appropriate side rails in place.

## 2021-08-16 NOTE — ED CDU PROVIDER DISPOSITION NOTE - CLINICAL COURSE
45yo F with no PMH, presenting with dysuria x 1 week and fever/chills and R-sided flank pain x 3 days. Also reports nausea and 2 episodes of vomiting today. Reports R flank pain is constant, cramping, and radiates to abdomen on R. Taking Tylenol for pain but has not taken any since last night.  Self treated dysuria with fluconazole because she thought it was a yeast infection, however dysuria persisted. Denies any HA, CP, cough, SOB, diarrhea, hematuria, abnl vaginal discharge. No previous abdominal surgeries. LMP 2 weeks ago.  In ED, pt febrile and tachycardic. Labs + mild leukocytosis and UA +lg leuks and WBCs. Lactate improved with IVF. CT consistent with R-sided pyelo. Pt feeling better after antiemetics, pain control, antipyretics, but but still with discomfort. Given persistent tachycardia and symptoms, plan to send pt to CDU for IVF, pain/fever control, advance diet, and abx. Pt amenable to plan.

## 2021-08-16 NOTE — ED ADULT TRIAGE NOTE - CHIEF COMPLAINT QUOTE
right flank pain, chills and vomiting   last week had urinary symptoms, has been taking OTC medications

## 2021-08-16 NOTE — ED PROVIDER NOTE - OBJECTIVE STATEMENT
45yo F with no PMH,   No previous abdominal surgeries. 45yo F with no PMH, presenting with dysuria x 1 week and fever/chills and R-sided flank pain x 3 days. Also reports nausea and 2 episodes of vomiting today. Reports R flank pain is constant, cramping, and radiates to abdomen on R. Taking Tylenol for pain but has not taken any since last night.  Self treated dysuria with fluconazole because she thought it was a yeast infection, however dysuria persisted. Denies any HA, CP, cough, SOB, diarrhea, hematuria, abnl vaginal discharge. No previous abdominal surgeries. LMP 2 weeks ago.

## 2021-08-16 NOTE — ED CDU PROVIDER INITIAL DAY NOTE - OBJECTIVE STATEMENT
47yo F with no PMH, presenting with dysuria x 1 week and fever/chills and R-sided flank pain x 3 days. Also reports nausea and 2 episodes of vomiting today. Reports R flank pain is constant, cramping, and radiates to abdomen on R. Taking Tylenol for pain but has not taken any since last night.  Self treated dysuria with fluconazole because she thought it was a yeast infection, however dysuria persisted. Denies any HA, CP, cough, SOB, diarrhea, hematuria, abnl vaginal discharge. No previous abdominal surgeries. LMP 2 weeks ago.  In ED, pt febrile and tachycardic. Labs + mild leukocytosis and UA +lg leuks and WBCs. Lactate improved with IVF. CT consistent with R-sided pyelo. Pt feeling better after antiemetics, pain control, antipyretics, but but still with discomfort. Given persistent tachycardia and symptoms, plan to send pt to CDU for IVF, pain/fever control, advance diet, and abx. Pt amenable to plan.

## 2021-08-16 NOTE — ED CDU PROVIDER INITIAL DAY NOTE - PROGRESS NOTE DETAILS
CDU PROGRESS NOTE PA HAY: Received pt at sign-out. Case/plan reviewed. Pt c/o pain to RLQ and lower abdomen. On Exam, patient mildly tachycardiac w/ tenderness to palpation RLQ and right CVAT. Will give Toradol 15mg IVP continue IVF and closely monitor.

## 2021-08-16 NOTE — ED PROVIDER NOTE - ATTENDING CONTRIBUTION TO CARE
RGUJRAL 47yo f no pmhx presents with R side abd pain, dysuria and frequency. + Fever/chills and nausea. Pt is sexually active with 1 partner, denies any concerning discharge. No cough, URI.   On exam, Patient is awake, alert and oriented x 3.  Patient is in mild distress secondary to pain.   NCAT  Neck is supple, No LAD.  Lungs are CTA B/L,+S1S2 no murmurs,  Abdomen:Soft nd/+RUQ tender +bs no rebound or guarding.+ RCVAT.  Extremity no edema or calf tender.  Skin with no rash.  Neuro CN3-12 intact. Strength 5/5 in upper and lower extremities. Nml Sensation.Gait normal.   Check labs, CT, UA to eval for pyelonephritis. Pain control and re eval.

## 2021-08-16 NOTE — ED CDU PROVIDER DISPOSITION NOTE - ATTENDING CONTRIBUTION TO CARE
Pt placed in CDU for tx of suspected pyelo. while in CDU, pt had + blood cultures - will admit for r/o bacteremia in setting of pyelonephritis.

## 2021-08-16 NOTE — ED CDU PROVIDER DISPOSITION NOTE - NSFOLLOWUPINSTRUCTIONS_ED_ALL_ED_FT
1) Follow-up with your Primary Medical Doctor. Call today / next business day for prompt follow-up.  2) Return to Emergency room for any worsening or persistent pain, weakness, fever, or any other concerning symptoms.  3) See attached instruction sheets for additional information, including information regarding signs and symptoms to look out for, reasons to seek immediate care and other important instructions.

## 2021-08-16 NOTE — ED ADULT NURSE NOTE - OBJECTIVE STATEMENT
complaining of abdominal pain, nausea and vomiting x today. Pt states, "I had urinary symptoms last week then fevers and chills last night then today nausea and vomiting." Pt endorses abdominal pain and nausea at present. Pt denies headache, lightheadedness, dizziness, blurred vision, SOB, chest pain, diarrhea, numbness and tingling at present.

## 2021-08-17 ENCOUNTER — TRANSCRIPTION ENCOUNTER (OUTPATIENT)
Age: 46
End: 2021-08-17

## 2021-08-17 DIAGNOSIS — N12 TUBULO-INTERSTITIAL NEPHRITIS, NOT SPECIFIED AS ACUTE OR CHRONIC: ICD-10-CM

## 2021-08-17 DIAGNOSIS — R78.81 BACTEREMIA: ICD-10-CM

## 2021-08-17 DIAGNOSIS — R50.9 FEVER, UNSPECIFIED: ICD-10-CM

## 2021-08-17 DIAGNOSIS — A41.9 SEPSIS, UNSPECIFIED ORGANISM: ICD-10-CM

## 2021-08-17 DIAGNOSIS — Z29.9 ENCOUNTER FOR PROPHYLACTIC MEASURES, UNSPECIFIED: ICD-10-CM

## 2021-08-17 LAB
ANION GAP SERPL CALC-SCNC: 9 MMOL/L — SIGNIFICANT CHANGE UP (ref 5–17)
BASOPHILS # BLD AUTO: 0.03 K/UL — SIGNIFICANT CHANGE UP (ref 0–0.2)
BASOPHILS NFR BLD AUTO: 0.3 % — SIGNIFICANT CHANGE UP (ref 0–2)
BUN SERPL-MCNC: 5 MG/DL — LOW (ref 7–23)
CALCIUM SERPL-MCNC: 7.7 MG/DL — LOW (ref 8.4–10.5)
CHLORIDE SERPL-SCNC: 110 MMOL/L — HIGH (ref 96–108)
CO2 SERPL-SCNC: 20 MMOL/L — LOW (ref 22–31)
CREAT SERPL-MCNC: 0.43 MG/DL — LOW (ref 0.5–1.3)
E COLI DNA BLD POS QL NAA+NON-PROBE: SIGNIFICANT CHANGE UP
EOSINOPHIL # BLD AUTO: 0.04 K/UL — SIGNIFICANT CHANGE UP (ref 0–0.5)
EOSINOPHIL NFR BLD AUTO: 0.5 % — SIGNIFICANT CHANGE UP (ref 0–6)
GLUCOSE SERPL-MCNC: 105 MG/DL — HIGH (ref 70–99)
GRAM STN FLD: SIGNIFICANT CHANGE UP
GRAM STN FLD: SIGNIFICANT CHANGE UP
HCT VFR BLD CALC: 32.7 % — LOW (ref 34.5–45)
HGB BLD-MCNC: 10.3 G/DL — LOW (ref 11.5–15.5)
IMM GRANULOCYTES NFR BLD AUTO: 1.1 % — SIGNIFICANT CHANGE UP (ref 0–1.5)
LYMPHOCYTES # BLD AUTO: 0.89 K/UL — LOW (ref 1–3.3)
LYMPHOCYTES # BLD AUTO: 10.2 % — LOW (ref 13–44)
MCHC RBC-ENTMCNC: 26.8 PG — LOW (ref 27–34)
MCHC RBC-ENTMCNC: 31.5 GM/DL — LOW (ref 32–36)
MCV RBC AUTO: 84.9 FL — SIGNIFICANT CHANGE UP (ref 80–100)
METHOD TYPE: SIGNIFICANT CHANGE UP
MONOCYTES # BLD AUTO: 0.7 K/UL — SIGNIFICANT CHANGE UP (ref 0–0.9)
MONOCYTES NFR BLD AUTO: 8 % — SIGNIFICANT CHANGE UP (ref 2–14)
NEUTROPHILS # BLD AUTO: 6.94 K/UL — SIGNIFICANT CHANGE UP (ref 1.8–7.4)
NEUTROPHILS NFR BLD AUTO: 79.9 % — HIGH (ref 43–77)
NRBC # BLD: 0 /100 WBCS — SIGNIFICANT CHANGE UP (ref 0–0)
PLATELET # BLD AUTO: 195 K/UL — SIGNIFICANT CHANGE UP (ref 150–400)
POTASSIUM SERPL-MCNC: 3.4 MMOL/L — LOW (ref 3.5–5.3)
POTASSIUM SERPL-SCNC: 3.4 MMOL/L — LOW (ref 3.5–5.3)
RBC # BLD: 3.85 M/UL — SIGNIFICANT CHANGE UP (ref 3.8–5.2)
RBC # FLD: 15.5 % — HIGH (ref 10.3–14.5)
SODIUM SERPL-SCNC: 139 MMOL/L — SIGNIFICANT CHANGE UP (ref 135–145)
SPECIMEN SOURCE: SIGNIFICANT CHANGE UP
WBC # BLD: 8.7 K/UL — SIGNIFICANT CHANGE UP (ref 3.8–10.5)
WBC # FLD AUTO: 8.7 K/UL — SIGNIFICANT CHANGE UP (ref 3.8–10.5)

## 2021-08-17 PROCEDURE — 99223 1ST HOSP IP/OBS HIGH 75: CPT | Mod: GC

## 2021-08-17 RX ORDER — ACETAMINOPHEN 500 MG
325 TABLET ORAL ONCE
Refills: 0 | Status: COMPLETED | OUTPATIENT
Start: 2021-08-17 | End: 2021-08-17

## 2021-08-17 RX ORDER — IBUPROFEN 200 MG
600 TABLET ORAL ONCE
Refills: 0 | Status: COMPLETED | OUTPATIENT
Start: 2021-08-17 | End: 2021-08-17

## 2021-08-17 RX ORDER — POTASSIUM CHLORIDE 20 MEQ
40 PACKET (EA) ORAL ONCE
Refills: 0 | Status: COMPLETED | OUTPATIENT
Start: 2021-08-17 | End: 2021-08-17

## 2021-08-17 RX ADMIN — CEFTRIAXONE 100 MILLIGRAM(S): 500 INJECTION, POWDER, FOR SOLUTION INTRAMUSCULAR; INTRAVENOUS at 14:48

## 2021-08-17 RX ADMIN — Medication 325 MILLIGRAM(S): at 04:38

## 2021-08-17 RX ADMIN — SODIUM CHLORIDE 150 MILLILITER(S): 9 INJECTION INTRAMUSCULAR; INTRAVENOUS; SUBCUTANEOUS at 04:35

## 2021-08-17 RX ADMIN — CEFTRIAXONE 100 MILLIGRAM(S): 500 INJECTION, POWDER, FOR SOLUTION INTRAMUSCULAR; INTRAVENOUS at 02:58

## 2021-08-17 RX ADMIN — Medication 650 MILLIGRAM(S): at 08:37

## 2021-08-17 RX ADMIN — Medication 40 MILLIEQUIVALENT(S): at 10:56

## 2021-08-17 RX ADMIN — Medication 325 MILLIGRAM(S): at 05:08

## 2021-08-17 RX ADMIN — Medication 600 MILLIGRAM(S): at 10:56

## 2021-08-17 RX ADMIN — Medication 600 MILLIGRAM(S): at 16:21

## 2021-08-17 RX ADMIN — Medication 650 MILLIGRAM(S): at 00:59

## 2021-08-17 RX ADMIN — Medication 650 MILLIGRAM(S): at 18:45

## 2021-08-17 RX ADMIN — Medication 650 MILLIGRAM(S): at 00:21

## 2021-08-17 NOTE — ED CDU PROVIDER SUBSEQUENT DAY NOTE - PROGRESS NOTE DETAILS
CDU PROGRESS NOTE PA HAY: Pt with positive blood culture, growth in aerobic bottle, gram negative zelda. c/d/w Dr. Corona, will admit for bacteremia.

## 2021-08-17 NOTE — H&P ADULT - ASSESSMENT
Ms. Torres is a 45yo F with no significant PMH presenting with one week of dysuria and urgency with fever, chills and right sided abdominal and flank pain for 3 days concerning for sepsis with gram negative bacteremia 2/2 acute pyelonephritis.

## 2021-08-17 NOTE — CONSULT NOTE ADULT - ASSESSMENT
47yo F admitted for urosepsis. CT demonstrated R pyelonephritis with poorly visualized ureter. In the ED, she was febrile to 38.2 and tachycardic with WBC of 12.08. UA and microscopy had large leuks, large blood, elevated WBC, and many bacteria. Lactate of 2.2 improved s/p 2L NS. CT AP c/w ascending urinary infection c/f acute pyelonephritis. BCx with gram negative rods s/p 1g Ceftriaxone. Her pain, nausea, and fever have improvement with Tylenol, Motrin, Ketorolac, morphine, and Zofran, but she still has continued to have mild abdominal and flank pain.  Currently, pt has been afebrile since 9PM yesterday. Tachycardia resolved. Leukocytosis resolved. BCx with GNR,. Reports no longer without dysuria and urine is clear yellow. Improving with ABX.     Recs:   47yo F admitted for urosepsis. CT demonstrated R pyelonephritis with poorly visualized ureter. In the ED, she was febrile to 38.2 and tachycardic with WBC of 12.08. UA and microscopy had large leuks, large blood, elevated WBC, and many bacteria. Lactate of 2.2 improved s/p 2L NS. CT AP c/w ascending urinary infection c/f acute pyelonephritis. BCx with gram negative rods s/p 1g Ceftriaxone. Her pain, nausea, and fever have improvement with Tylenol, Motrin, Ketorolac, morphine, and Zofran, but she still has continued to have mild abdominal and flank pain.  Currently, pt has been afebrile since 9PM yesterday. Tachycardia resolved. Leukocytosis resolved. BCx with GNR,. Reports no longer without dysuria and urine is clear yellow. Improving with ABX.     --CT demonstrating symmetric perfusion to both kidneys. Right kidney with some parenchymal changes and areas of heterogeneity consistent with pyelonephritis.   -- Decreased peristalsis of ureter can be seen with pyelonephritis   -- Creatinine WNL with mild hydronephrosis, significant obstruction unlikely     Recs:   -- no acute urologic surgical intervention indicated  -- continue medical management with IV Antibiotics and IVF   -- please obtain PVR to ensure adequate emptying as CT demonstrated distended bladder  -- recommend obtaining CT with delayed urographic imaging to better delineate right ureter   -- please call urology with any questions    Discussed with Dr. Santhosh Tan  Urology PGY2

## 2021-08-17 NOTE — H&P ADULT - NSHPLABSRESULTS_GEN_ALL_CORE
10.3   8.70  )-----------( 195      ( 17 Aug 2021 07:04 )             32.7     08-17    139  |  110<H>  |  5<L>  ----------------------------<  105<H>  3.4<L>   |  20<L>  |  0.43<L>    Ca    7.7<L>      17 Aug 2021 07:04    TPro  8.0  /  Alb  4.3  /  TBili  0.7  /  DBili  x   /  AST  23  /  ALT  9<L>  /  AlkPhos  73  08-16    Urinalysis Basic - ( 16 Aug 2021 15:02 )    Color: Yellow / Appearance: Slightly Turbid / S.018 / pH: x  Gluc: x / Ketone: Negative  / Bili: Negative / Urobili: Negative   Blood: x / Protein: 30 mg/dL / Nitrite: Negative   Leuk Esterase: Large / RBC: 2 /hpf /  /HPF   Sq Epi: x / Non Sq Epi: 2 /hpf / Bacteria: Many    Urinalysis Basic - ( 16 Aug 2021 15:02 )    Color: Yellow / Appearance: Slightly Turbid / S.018 / pH: x  Gluc: x / Ketone: Negative  / Bili: Negative / Urobili: Negative   Blood: x / Protein: 30 mg/dL / Nitrite: Negative   Leuk Esterase: Large / RBC: 2 /hpf /  /HPF   Sq Epi: x / Non Sq Epi: 2 /hpf / Bacteria: Many    RADIOLOGY & ADDITIONAL TESTS:    CT AP:    Abnormal enhancement of the right ureter consistent with ascending urinary infection. Distal ureter is not well visualized. Consider follow-up CT urogram for further evaluation as indicated.    Imaging Personally Reviewed:  [ x] YES  [ ] NO

## 2021-08-17 NOTE — DISCHARGE NOTE PROVIDER - HOSPITAL COURSE
Ms. Torres is a 47yo F with no significant PMH presenting with one week of dysuria and urgency with fever, chills and right sided abdominal and flank pain for 3 days concerning for sepsis with gram negative bacteremia 2/2 acute pyelonephritis. Ms. Torres is a 47yo F with no significant PMH presenting with one week of dysuria and urgency with fever, chills and right sided abdominal and flank pain for 3 days concerning for sepsis with gram negative bacteremia 2/2 acute pyelonephritis. She was initially tachycardic and febrile with clinical findings concerning for sepsis and was treated with 2L NS and ceftriaxone in the ED. She improved with antibiotic treatment and remained afebrile without tachycardia. UA revealed large blood, large leuks, and negative nitrates. BCx grew gram negative rods... Ms. Torres is a 45yo F with no significant PMH presenting with one week of dysuria and urgency with fever, chills and right sided abdominal and flank pain for 3 days concerning for sepsis with gram negative bacteremia 2/2 acute pyelonephritis. She was initially tachycardic and febrile with clinical findings concerning for sepsis and was treated with 2L NS and ceftriaxone in the ED. She improved with antibiotic treatment and remained afebrile without tachycardia. UA revealed large blood, large leuks, and negative nitrates. BCx and UCx grew E. coli sensitive for ceftriaxone, UCx with intermediate resistance to Cipro. Initial CT AP revealed Ms. Torres is a 45yo F with no significant PMH presenting with one week of dysuria and urgency with fever, chills and right sided abdominal and flank pain for 3 days concerning for sepsis with gram negative bacteremia 2/2 acute pyelonephritis. She was initially tachycardic and febrile with clinical findings concerning for sepsis and was treated with 2L NS and ceftriaxone in the ED. She improved with antibiotic treatment and remained afebrile without tachycardia. UA revealed large blood, large leuks, and negative nitrates. BCx and UCx grew E. coli sensitive for ceftriaxone, UCx with intermediate resistance to Cipro. Initial CT AP revealed right ureter enhancement c/w ascending infection with poor visualization of distal ureter. Urology consulted for possible stricture or obstruction. Follow up CT Urogram with no ureteral lesion or obstruction. Oral cefpodoxime to be continued outpatient.    Patient is hemodynamically stable and ready for discharge. Ms. Torres is a 45yo F with no significant PMH presenting with one week of dysuria and urgency with fever, chills and right sided abdominal and flank pain for 3 days concerning for sepsis with gram negative bacteremia 2/2 acute pyelonephritis. She was initially tachycardic and febrile with clinical findings concerning for sepsis and was treated with 2L NS and ceftriaxone in the ED. She improved with antibiotic treatment and remained afebrile without tachycardia. UA revealed large blood, large leuks, and negative nitrates. BCx and UCx grew E. coli sensitive for ceftriaxone, UCx with intermediate resistance to Cipro. Initial CT AP revealed right ureter enhancement c/w ascending infection with poor visualization of distal ureter consistent with pyelonephritis. Urology consulted for possible stricture or obstruction. Follow up CT Urogram confirmed right pyelonephritis with no ureteral lesion or obstruction. Oral cefpodoxime to be continued outpatient.    Patient is hemodynamically stable and ready for discharge. Ms. Torres is a 47yo F with no significant PMH presenting with one week of dysuria and urgency with fever, chills and right sided abdominal and flank pain for 3 days concerning for sepsis with gram negative bacteremia 2/2 acute pyelonephritis. She was initially tachycardic and febrile with clinical findings concerning for sepsis and was treated with 2L NS and ceftriaxone in the ED. She improved with antibiotic treatment and remained afebrile without tachycardia. UA revealed large blood, large leuks, and negative nitrates. BCx and UCx grew E. coli sensitive for ceftriaxone, UCx with intermediate resistance to Cipro. Initial CT AP revealed right ureter enhancement c/w ascending infection with poor visualization of distal ureter. Urology consulted for possible stricture or obstruction. Follow up CT Urogram confirmed right pyelonephritis with no ureteral lesion or obstruction. Oral cefpodoxime to be continued outpatient.    Patient is hemodynamically stable and ready for discharge.

## 2021-08-17 NOTE — ED CDU PROVIDER SUBSEQUENT DAY NOTE - HISTORY
CDU PROGRESS NOTE PA HAY: Pt resting in stretcher, NAD, VSS. Abdomen soft, +mild ttp right flank w/ right CVAT. Pt reports pain has subsided and feeling better. Will continue to monitor, f/u am labs.

## 2021-08-17 NOTE — DISCHARGE NOTE PROVIDER - NSDCFUADDAPPT_GEN_ALL_CORE_FT
Please follow with up with your primary care provider for a post-hospitalization follow-up after your trip to [place].

## 2021-08-17 NOTE — ED PROCEDURE NOTE - PROCEDURE ADDITIONAL DETAILS
Emergency Department Focused Ultrasound performed at patient's bedside for educational purposes. An appropriate follow up study is ordered. -Madi Cox PA-C

## 2021-08-17 NOTE — H&P ADULT - NSHPPHYSICALEXAM_GEN_ALL_CORE
GENERAL: NAD, well-groomed, well-developed  HEAD:  Atraumatic, normocephalic  EYES: EOMI, PERRLA, conjunctiva and sclera clear  ENMT: No tonsillar erythema, exudates, or enlargement; Moist mucous membranes, good dentition, no lesions  NECK: Supple, no JVD, normal thyroid  NERVOUS SYSTEM:  Alert & oriented X3, good concentration, motor strength 5/5 B/L upper and lower extremities  CHEST/LUNG: Clear to auscultation bilaterally, no rales, rhonchi, or wheezes  HEART: Regular rate and rhythm, no murmurs, rubs, or gallops  ABDOMEN: Soft, nondistended, bowel sounds present, pain with light palpation of RUQ and flank, no guarding or rebound tenderness, no radiation of pain, minimal CVAT  EXTREMITIES:  2+ peripheral pulses, no clubbing, cyanosis, or edema  LYMPH: No lymphadenopathy noted  SKIN: No rashes or lesions Vital Signs Last 24 Hrs  T(C): 36.7 (17 Aug 2021 19:09), Max: 38.2 (16 Aug 2021 21:19)  T(F): 98 (17 Aug 2021 19:09), Max: 100.7 (16 Aug 2021 21:19)  HR: 83 (17 Aug 2021 19:09) (83 - 105)  BP: 109/80 (17 Aug 2021 19:09) (98/59 - 109/80)  BP(mean): --  RR: 17 (17 Aug 2021 19:09) (16 - 19)  SpO2: 100% (17 Aug 2021 19:09) (97% - 100%)GENERAL: NAD, well-groomed, well-developed  HEAD:  Atraumatic, normocephalic  EYES: EOMI, PERRLA, conjunctiva and sclera clear  ENMT: No tonsillar erythema, exudates, or enlargement; Moist mucous membranes, good dentition, no lesions  NECK: Supple, no JVD, normal thyroid  NERVOUS SYSTEM:  Alert & oriented X3, good concentration, motor strength 5/5 B/L upper and lower extremities  CHEST/LUNG: Clear to auscultation bilaterally, no rales, rhonchi, or wheezes  HEART: Regular rate and rhythm, no murmurs, rubs, or gallops  ABDOMEN: Soft, nondistended, bowel sounds present, pain with light palpation of RUQ and flank, no guarding or rebound tenderness, no radiation of pain, minimal CVAT  EXTREMITIES:  2+ peripheral pulses, no clubbing, cyanosis, or edema  LYMPH: No lymphadenopathy noted  SKIN: No rashes or lesions

## 2021-08-17 NOTE — DISCHARGE NOTE PROVIDER - NSDCMRMEDTOKEN_GEN_ALL_CORE_FT
cefpodoxime 200 mg oral tablet: 1 tab(s) orally 2 times a day    cefpodoxime 200 mg oral tablet: 1 tab(s) orally every 12 hours

## 2021-08-17 NOTE — H&P ADULT - PROBLEM SELECTOR PLAN 1
Fever and tachycardia with +BCx 2/2 acute pyelonephritis   - s/p 2L NS  - s/p 1g ceftriaxone  - c/w ceftriaxone 1g q12h Fever and tachycardia with +BCx 2/2 acute pyelonephritis   - s/p 2L NS  - s/p 1g ceftriaxone  - c/w ceftriaxone 1g q12h, no risk factors for MDR organisms Fever and tachycardia with +BCx c/w severe sepsis 2/2 acute pyelonephritis   - s/p 2L NS  - s/p 1g ceftriaxone  - c/w ceftriaxone 1g q12h, no risk factors for MDR organisms Fever and tachycardia with +BCx c/w severe sepsis 2/2 acute pyelonephritis   - s/p 2L NS  - s/p 1g ceftriaxone  - c/w ceftriaxone 1g q12h, no risk factors for MDR organisms  - plan for switch to oral abx with sensitivities

## 2021-08-17 NOTE — DISCHARGE NOTE PROVIDER - CARE PROVIDER_API CALL
Dafne Houston)  Internal Medicine  [address], [address]  [place] [address]  Phone: [phone]  Fax: [phone]  Established Patient  Follow Up Time: Routine

## 2021-08-17 NOTE — H&P ADULT - NSHPSOCIALHISTORY_GEN_ALL_CORE
Social smoking and alcohol use, states 1 cigarette and glass of wine per week. No other drugs use. Sexually active with one partner. No children. Has a plane ticket to [place] leaving on 8/19 am and desires to leave for this trip.

## 2021-08-17 NOTE — H&P ADULT - ATTENDING COMMENTS
Patient seen and evaluted at bedside with boyfrientin Crespo.  Patient with improving symptoms. No further dysuria or fevers. On exam, +CVA tenderness, otherwise benign.     #severe sepsis 2/2 e.coli bacteremia  -patient with GNR bacteremia in setting of pyelonephritis.  -continue ceftriaxone.   -if afebrile next 24 hours and no obstruction on CT urogram, can transition to oral Abx to complete 7-10 day course.   -MS4 reviewed viraj Patient seen and evaluted at bedside with boyfrientin Crespo.  Patient with improving symptoms. No further dysuria or fevers. On exam, +CVA tenderness, otherwise benign.     #severe sepsis 2/2 e.coli bacteremia  -patient with GNR bacteremia in setting of pyelonephritis.  -continue ceftriaxone.   -if afebrile next 24 hours and no obstruction on CT urogram, can transition to oral Abx to complete 7-10 day course.     #Proximal Ureteral Stricture]  -patient with mild hydro and proximal ureteral stricture with enhanceement. Given concominant bacteremia, asked urology to consult to see if cystoscopy.   -send urine cytology  -CT urogram per urology.     rest as above

## 2021-08-17 NOTE — H&P ADULT - PROBLEM SELECTOR PLAN 2
Gram negative bacteremia 2/2 acute pyelonephritis  - f/u BCx speciation  - f/u repeat BCx  - f/u UCx Gram negative bacteremia 2/2 acute pyelonephritis  - f/u BCx speciation and sensitivity  - f/u UCx speciation and sensitivity  - f/u repeat BCx  - c/w abx as above  - plan for switch to oral abx with sensitivities Gram negative bacteremia 2/2 acute pyelonephritis  - f/u BCx speciation and sensitivity  - f/u UCx speciation and sensitivity  - f/u repeat BCx  - c/w abx as above

## 2021-08-17 NOTE — H&P ADULT - PROBLEM SELECTOR PLAN 4
DVT Prophylaxis: Lovenox  Diet: Regular Febrile iso bacteremia and acute pyelonephritis  - c/w Tylenol 650 q6h

## 2021-08-17 NOTE — H&P ADULT - HISTORY OF PRESENT ILLNESS
Ms. Torres is a 47yo F with no significant PMH presenting with one week of dysuria and urgency with fever, chills and right sided abdominal and flank pain for 3 days prior to presentation. Her abdominal and flank pain is constant, dull, and cramping and did not significantly improve with OTC Tylenol. When presenting to the ED, she additionally had significant nausea with 2 episodes of vomiting. She initially believed she had a yeast infection and self treated with OTC fluconazole as she has had several previously. She previously visited the hospital 9 years ago for an uncomplicated UTI. She denies chest pain, SOB, cough, diarrhea, constipation, hematuria, vaginal discharge.    In the ED, she was febrile to 38.2 and tachycardic with WBC of 12.08. UA and microscopy had large leuks, large blood, elevated WBC, and many bacteria. Lactate of 2.2 improved s/p 2L NS. CT AP c/w ascending urinary infection c/f acute pyelonephritis. BCx with gram negative rods s/p 1g Ceftriaxone. Her pain, nausea, and fever have improvement with Tylenol, Motrin, Ketorolac, morphine, and Zofran, but she still has continued mild abdominal and flank pain.  Ms. Torres is a 47yo F with no significant PMH presenting with one week of dysuria and urgency with fever, chills and right sided abdominal and flank pain for 3 days prior to presentation. Her abdominal and flank pain is constant, dull, and cramping and did not significantly improve with OTC Tylenol. When presenting to the ED, she additionally had significant nausea with 2 episodes of vomiting. She initially believed she had a yeast infection and self treated with fluconazole as she has had several previously. She previously visited the hospital 9 years ago for an uncomplicated UTI. She denies chest pain, SOB, cough, diarrhea, constipation, hematuria, vaginal discharge.    In the ED, she was febrile to 38.2 and tachycardic with WBC of 12.08. UA and microscopy had large leuks, large blood, elevated WBC, and many bacteria. Lactate of 2.2 improved s/p 2L NS. CT AP c/w ascending urinary infection c/f acute pyelonephritis. BCx with gram negative rods s/p 1g Ceftriaxone. Her pain, nausea, and fever have improvement with Tylenol, Motrin, Ketorolac, morphine, and Zofran, but she still has continued to have mild abdominal and flank pain.  Ms. Torres is a 45yo F with no significant PMH presenting with one week of dysuria and urgency with fever, chills and right sided abdominal and flank pain for 3 days prior to presentation. Her abdominal and flank pain is constant, dull, and cramping and did not significantly improve with OTC Tylenol. When presenting to the ED, she additionally had significant nausea with 2 episodes of vomiting. She initially believed she had a yeast infection and self treated with fluconazole as she has had several previously. She previously visited the hospital 9 years ago for an uncomplicated UTI. She denies chest pain, SOB, cough, diarrhea, constipation, hematuria, and vaginal discharge.    In the ED, she was febrile to 38.2 and tachycardic with WBC of 12.08. UA and microscopy had large leuks, large blood, elevated WBC, and many bacteria. Lactate of 2.2 improved s/p 2L NS. CT AP c/w ascending urinary infection c/f acute pyelonephritis. BCx with gram negative rods s/p 1g Ceftriaxone. Her pain, nausea, and fever have improvement with Tylenol, Motrin, Ketorolac, morphine, and Zofran, but she still has continued to have mild abdominal and flank pain.

## 2021-08-17 NOTE — ED ADULT NURSE REASSESSMENT NOTE - NS ED NURSE REASSESS COMMENT FT1
Pt resting comfortably, states shes hungry, tolerating food and water.
Pt received from BROOKE Ward. Pt oriented to CDU & plan of care was discussed. Pt endorses 7/10 R abdomen pain and chills. Medicated as per MAR. Pt denies any pain or burning with urination and states her urine appears more clear. Safety & comfort measures maintained. Call bell in reach. Will continue to monitor.
07.00 Am Received the Pt from  BROOKE Gomez . Pt is Observed for Bacteremia /Pyelonephritis. Pt is admitted awaiting  for the bed   . Received the Pt A&OX 4 obeys commands Kasia N/V/D fever chills cp SOB   Comfort care & safety measures continued  IV site looks clean & dry no signs of infiltration noted pt denies  pain IV site .  Pt is advised to call for help  call bell with in the reach pt verbalized the understanding . GCS 15/15 A&OX 4 PERRLA  size 3 Strong upper & lower extremities steady gait   No facial droop  No Hand Leg drop denies numbness tingling Continue to monitor

## 2021-08-17 NOTE — DISCHARGE NOTE PROVIDER - NSDCCPCAREPLAN_GEN_ALL_CORE_FT
PRINCIPAL DISCHARGE DIAGNOSIS  Diagnosis: Pyelonephritis  Assessment and Plan of Treatment: You had an infection in your urinary tract that involved your kidney and spread to your blood. You were treated with ceftriaxone to treat this infection. You should continue to take ... outpatient until ?/?. Return to care if you continue to have abdominal or back pain, pain when urinating, fever or chills, or have difficulty performing normal daily tasks.       PRINCIPAL DISCHARGE DIAGNOSIS  Diagnosis: Pyelonephritis  Assessment and Plan of Treatment: You had an infection in your urinary tract that involved your kidney and spread to your blood. You were treated with ceftriaxone to treat this infection. You should continue to take cefpodoxime outpatient until 8/25. Return to care if you continue to have abdominal or back pain, pain when urinating, fever or chills, or have difficulty performing normal daily tasks.       PRINCIPAL DISCHARGE DIAGNOSIS  Diagnosis: Pyelonephritis  Assessment and Plan of Treatment: You had an infection in your urinary tract that involved your kidney and spread to your blood. You were treated with IV ceftriaxone to treat this infection. You should continue to take oral cefpodoxime 200 mg twice a day until completion of the treatment. Please follow up with your primary care doctor and the urology clinic after discharge. Return to care if you continue to have abdominal or back pain, pain when urinating, fever or chills, or have difficulty performing normal daily tasks.

## 2021-08-17 NOTE — CONSULT NOTE ADULT - SUBJECTIVE AND OBJECTIVE BOX
HPI: 47yo F with no significant PMH presenting with one week of dysuria and urgency with fever, chills and right sided abdominal and flank pain for 3 days prior to presentation. Her abdominal and flank pain is constant, dull, and cramping and did not significantly improve with OTC Tylenol. When presenting to the ED, she additionally had significant nausea with 2 episodes of vomiting. She initially believed she had a yeast infection and self treated with fluconazole as she has had several previously. She previously visited the hospital 9 years ago for an uncomplicated UTI. She denies chest pain, SOB, cough, diarrhea, constipation, hematuria, and vaginal discharge.    In the ED, she was febrile to 38.2 and tachycardic with WBC of 12.08. UA and microscopy had large leuks, large blood, elevated WBC, and many bacteria. Lactate of 2.2 improved s/p 2L NS. CT AP c/w ascending urinary infection c/f acute pyelonephritis. BCx with gram negative rods s/p 1g Ceftriaxone. Her pain, nausea, and fever have improvement with Tylenol, Motrin, Ketorolac, morphine, and Zofran, but she still has continued to have mild abdominal and flank pain.  Currently, pt has been afebrile since 9PM yesterday. Tachycardia resolved. Leukocytosis resolved. BCx with GNR,. Reports no longer without dysuria and urine is clear yellow.     Urology team consulted for right pyelonephritis in the setting of mild right hydro and a poorly visualized R ureter.     PAST MEDICAL & SURGICAL HISTORY:  No pertinent past medical history    No significant past surgical history    MEDICATIONS  (STANDING):  cefTRIAXone   IVPB 1000 milliGRAM(s) IV Intermittent every 12 hours    MEDICATIONS  (PRN):  acetaminophen   Tablet .. 650 milliGRAM(s) Oral every 6 hours PRN Temp greater or equal to 38C (100.4F), Mild Pain (1 - 3)      FAMILY HISTORY:  No pertinent family history in first degree relatives        Allergies    No Known Allergies    Intolerances        SOCIAL HISTORY:    REVIEW OF SYSTEMS: Otherwise negative as stated in HPI    Physical Exam  Vital signs  T(C): 36.7 (21 @ 15:51), Max: 38.2 (21 @ 21:19)  HR: 98 (21 @ 12:06)  BP: 106/73 (21 @ 15:51)  SpO2: 97% (21 @ 15:51)    Output    Gen: No acute distress  Pulm: No respiratory distress  Abd: soft, nontender, nondistended  Back: + R CVAT. no L CVAT  : no suprapubic tenderness      LABS:       @ 07:04    WBC 8.70  / Hct 32.7  / SCr 0.43      @ 12:01    WBC 12.08 / Hct 41.2  / SCr 0.46         139  |  110<H>  |  5<L>  ----------------------------<  105<H>  3.4<L>   |  20<L>  |  0.43<L>    Ca    7.7<L>      17 Aug 2021 07:04    TPro  8.0  /  Alb  4.3  /  TBili  0.7  /  DBili  x   /  AST  23  /  ALT  9<L>  /  AlkPhos  73        Urinalysis Basic - ( 16 Aug 2021 15:02 )    Color: Yellow / Appearance: Slightly Turbid / S.018 / pH: x  Gluc: x / Ketone: Negative  / Bili: Negative / Urobili: Negative   Blood: x / Protein: 30 mg/dL / Nitrite: Negative   Leuk Esterase: Large / RBC: 2 /hpf /  /HPF   Sq Epi: x / Non Sq Epi: 2 /hpf / Bacteria: Many        Urine Cx: pending  Blood Cx: Culture - Blood (21 @ 14:27)    -  Escherichia coli: Detec    Gram Stain:   Growth in aerobic bottle: Gram Negative Rods  Growth in anaerobic bottle: Gram Negative Rods    Specimen Source: .Blood Blood    Organism: Blood Culture PCR    Culture Results:   Growth in aerobic bottle: Gram Negative Rods  Growth in anaerobic bottle: Gram Negative Rods  ***Blood Panel PCR results on this specimen are available  approximately 3 hours after the Gram stain result.***  Gram stain, PCR, and/or culture results may not always  correspond due to difference in methodologies.  ************************************************************  This PCR assay was performed by multiplex PCR. This  Assay tests for 66 bacterial and resistance gene targets.  Please refer to the [facility] test directory  at [email or web address] for details.    Organism Identification: Blood Culture PCR    Method Type: PCR      RADIOLOGY:  < from: CT Abdomen and Pelvis w/ IV Cont (21 @ 16:38) >  EXAM:  CT ABDOMEN AND PELVIS IC                          PROCEDURE DATE:  2021      INTERPRETATION:  CLINICAL INFORMATION: Right-sided abdominal pain, fevers, vomiting, dysuria.    COMPARISON: Ultrasound abdomen, 2019.    CONTRAST/COMPLICATIONS:  IV Contrast: Omnipaque 350  90 cc administered   10 cc discarded.  Oral Contrast: None  Complications: None reported.    PROCEDURE:  CT of the Abdomen and Pelvis was performed.  Sagittal and coronal reformats were performed.    FINDINGS:  LOWER CHEST: Mild bibasilar dependent atelectasis.    LIVER: Hepatomegaly.  BILE DUCTS: Normal caliber.  GALLBLADDER: Within normal limits.  SPLEEN: Within normal limits.  PANCREAS: Within normal limits.  ADRENALS: Within normal limits.  KIDNEYS/URETERS: Right renal cortical scarring and mild right renal atrophy. Mild right hydroureteronephrosis to the level of the right pelvis without evidence of an obstructing stone. Diffuse urothelial enhancement of the right ureter consistent with ascending infection. Distal right ureter is not well visualized.    BLADDER: Within normal limits.  REPRODUCTIVE ORGANS: Uterine fibroids.    BOWEL: No bowel obstruction. Appendix is normal.  PERITONEUM: No ascites.  VESSELS: Within normal limits.  RETROPERITONEUM/LYMPH NODES: No lymphadenopathy.  ABDOMINAL WALL: Within normal limits.  BONES: Degenerative changes.    IMPRESSION:  Abnormal enhancement of the right ureter consistent with ascending urinary infection. Distal ureter is not well visualized. Consider follow-up CT urogram for further evaluation as indicated.    --- End of Report ---    < end of copied text >

## 2021-08-18 LAB
-  AMIKACIN: SIGNIFICANT CHANGE UP
-  AMIKACIN: SIGNIFICANT CHANGE UP
-  AMOXICILLIN/CLAVULANIC ACID: SIGNIFICANT CHANGE UP
-  AMPICILLIN/SULBACTAM: SIGNIFICANT CHANGE UP
-  AMPICILLIN/SULBACTAM: SIGNIFICANT CHANGE UP
-  AMPICILLIN: SIGNIFICANT CHANGE UP
-  AMPICILLIN: SIGNIFICANT CHANGE UP
-  AZTREONAM: SIGNIFICANT CHANGE UP
-  AZTREONAM: SIGNIFICANT CHANGE UP
-  CEFAZOLIN: SIGNIFICANT CHANGE UP
-  CEFAZOLIN: SIGNIFICANT CHANGE UP
-  CEFEPIME: SIGNIFICANT CHANGE UP
-  CEFEPIME: SIGNIFICANT CHANGE UP
-  CEFOXITIN: SIGNIFICANT CHANGE UP
-  CEFOXITIN: SIGNIFICANT CHANGE UP
-  CEFTRIAXONE: SIGNIFICANT CHANGE UP
-  CEFTRIAXONE: SIGNIFICANT CHANGE UP
-  CIPROFLOXACIN: SIGNIFICANT CHANGE UP
-  CIPROFLOXACIN: SIGNIFICANT CHANGE UP
-  ERTAPENEM: SIGNIFICANT CHANGE UP
-  ERTAPENEM: SIGNIFICANT CHANGE UP
-  GENTAMICIN: SIGNIFICANT CHANGE UP
-  GENTAMICIN: SIGNIFICANT CHANGE UP
-  IMIPENEM: SIGNIFICANT CHANGE UP
-  IMIPENEM: SIGNIFICANT CHANGE UP
-  LEVOFLOXACIN: SIGNIFICANT CHANGE UP
-  LEVOFLOXACIN: SIGNIFICANT CHANGE UP
-  MEROPENEM: SIGNIFICANT CHANGE UP
-  MEROPENEM: SIGNIFICANT CHANGE UP
-  NITROFURANTOIN: SIGNIFICANT CHANGE UP
-  PIPERACILLIN/TAZOBACTAM: SIGNIFICANT CHANGE UP
-  PIPERACILLIN/TAZOBACTAM: SIGNIFICANT CHANGE UP
-  TIGECYCLINE: SIGNIFICANT CHANGE UP
-  TOBRAMYCIN: SIGNIFICANT CHANGE UP
-  TOBRAMYCIN: SIGNIFICANT CHANGE UP
-  TRIMETHOPRIM/SULFAMETHOXAZOLE: SIGNIFICANT CHANGE UP
-  TRIMETHOPRIM/SULFAMETHOXAZOLE: SIGNIFICANT CHANGE UP
ANION GAP SERPL CALC-SCNC: 10 MMOL/L — SIGNIFICANT CHANGE UP (ref 5–17)
BASOPHILS # BLD AUTO: 0.03 K/UL — SIGNIFICANT CHANGE UP (ref 0–0.2)
BASOPHILS NFR BLD AUTO: 0.6 % — SIGNIFICANT CHANGE UP (ref 0–2)
BUN SERPL-MCNC: 6 MG/DL — LOW (ref 7–23)
CALCIUM SERPL-MCNC: 8.5 MG/DL — SIGNIFICANT CHANGE UP (ref 8.4–10.5)
CHLORIDE SERPL-SCNC: 108 MMOL/L — SIGNIFICANT CHANGE UP (ref 96–108)
CO2 SERPL-SCNC: 20 MMOL/L — LOW (ref 22–31)
COVID-19 SPIKE DOMAIN AB INTERP: POSITIVE
COVID-19 SPIKE DOMAIN ANTIBODY RESULT: >250 U/ML — HIGH
CREAT SERPL-MCNC: 0.39 MG/DL — LOW (ref 0.5–1.3)
CULTURE RESULTS: SIGNIFICANT CHANGE UP
CULTURE RESULTS: SIGNIFICANT CHANGE UP
EOSINOPHIL # BLD AUTO: 0.14 K/UL — SIGNIFICANT CHANGE UP (ref 0–0.5)
EOSINOPHIL NFR BLD AUTO: 2.7 % — SIGNIFICANT CHANGE UP (ref 0–6)
GLUCOSE SERPL-MCNC: 95 MG/DL — SIGNIFICANT CHANGE UP (ref 70–99)
HCG UR QL: NEGATIVE — SIGNIFICANT CHANGE UP
HCT VFR BLD CALC: 34.1 % — LOW (ref 34.5–45)
HGB BLD-MCNC: 11 G/DL — LOW (ref 11.5–15.5)
IMM GRANULOCYTES NFR BLD AUTO: 0.4 % — SIGNIFICANT CHANGE UP (ref 0–1.5)
LYMPHOCYTES # BLD AUTO: 0.74 K/UL — LOW (ref 1–3.3)
LYMPHOCYTES # BLD AUTO: 14.3 % — SIGNIFICANT CHANGE UP (ref 13–44)
MAGNESIUM SERPL-MCNC: 2.2 MG/DL — SIGNIFICANT CHANGE UP (ref 1.6–2.6)
MCHC RBC-ENTMCNC: 27.4 PG — SIGNIFICANT CHANGE UP (ref 27–34)
MCHC RBC-ENTMCNC: 32.3 GM/DL — SIGNIFICANT CHANGE UP (ref 32–36)
MCV RBC AUTO: 85 FL — SIGNIFICANT CHANGE UP (ref 80–100)
METHOD TYPE: SIGNIFICANT CHANGE UP
METHOD TYPE: SIGNIFICANT CHANGE UP
MONOCYTES # BLD AUTO: 0.45 K/UL — SIGNIFICANT CHANGE UP (ref 0–0.9)
MONOCYTES NFR BLD AUTO: 8.7 % — SIGNIFICANT CHANGE UP (ref 2–14)
NEUTROPHILS # BLD AUTO: 3.8 K/UL — SIGNIFICANT CHANGE UP (ref 1.8–7.4)
NEUTROPHILS NFR BLD AUTO: 73.3 % — SIGNIFICANT CHANGE UP (ref 43–77)
NRBC # BLD: 0 /100 WBCS — SIGNIFICANT CHANGE UP (ref 0–0)
ORGANISM # SPEC MICROSCOPIC CNT: SIGNIFICANT CHANGE UP
PHOSPHATE SERPL-MCNC: 2.5 MG/DL — SIGNIFICANT CHANGE UP (ref 2.5–4.5)
PLATELET # BLD AUTO: 204 K/UL — SIGNIFICANT CHANGE UP (ref 150–400)
POTASSIUM SERPL-MCNC: 3.7 MMOL/L — SIGNIFICANT CHANGE UP (ref 3.5–5.3)
POTASSIUM SERPL-SCNC: 3.7 MMOL/L — SIGNIFICANT CHANGE UP (ref 3.5–5.3)
RBC # BLD: 4.01 M/UL — SIGNIFICANT CHANGE UP (ref 3.8–5.2)
RBC # FLD: 15.5 % — HIGH (ref 10.3–14.5)
SARS-COV-2 IGG+IGM SERPL QL IA: >250 U/ML — HIGH
SARS-COV-2 IGG+IGM SERPL QL IA: POSITIVE
SODIUM SERPL-SCNC: 138 MMOL/L — SIGNIFICANT CHANGE UP (ref 135–145)
SPECIMEN SOURCE: SIGNIFICANT CHANGE UP
SPECIMEN SOURCE: SIGNIFICANT CHANGE UP
WBC # BLD: 5.18 K/UL — SIGNIFICANT CHANGE UP (ref 3.8–10.5)
WBC # FLD AUTO: 5.18 K/UL — SIGNIFICANT CHANGE UP (ref 3.8–10.5)

## 2021-08-18 PROCEDURE — 74178 CT ABD&PLV WO CNTR FLWD CNTR: CPT | Mod: 26

## 2021-08-18 PROCEDURE — 99233 SBSQ HOSP IP/OBS HIGH 50: CPT | Mod: GC

## 2021-08-18 RX ORDER — IBUPROFEN 200 MG
200 TABLET ORAL ONCE
Refills: 0 | Status: COMPLETED | OUTPATIENT
Start: 2021-08-18 | End: 2021-08-18

## 2021-08-18 RX ORDER — HEPARIN SODIUM 5000 [USP'U]/ML
5000 INJECTION INTRAVENOUS; SUBCUTANEOUS EVERY 12 HOURS
Refills: 0 | Status: DISCONTINUED | OUTPATIENT
Start: 2021-08-18 | End: 2021-08-19

## 2021-08-18 RX ORDER — IBUPROFEN 200 MG
400 TABLET ORAL ONCE
Refills: 0 | Status: COMPLETED | OUTPATIENT
Start: 2021-08-18 | End: 2021-08-18

## 2021-08-18 RX ADMIN — Medication 400 MILLIGRAM(S): at 21:22

## 2021-08-18 RX ADMIN — CEFTRIAXONE 100 MILLIGRAM(S): 500 INJECTION, POWDER, FOR SOLUTION INTRAMUSCULAR; INTRAVENOUS at 02:41

## 2021-08-18 RX ADMIN — Medication 650 MILLIGRAM(S): at 08:58

## 2021-08-18 RX ADMIN — CEFTRIAXONE 100 MILLIGRAM(S): 500 INJECTION, POWDER, FOR SOLUTION INTRAMUSCULAR; INTRAVENOUS at 13:26

## 2021-08-18 RX ADMIN — Medication 400 MILLIGRAM(S): at 21:52

## 2021-08-18 RX ADMIN — Medication 200 MILLIGRAM(S): at 06:12

## 2021-08-18 RX ADMIN — Medication 650 MILLIGRAM(S): at 09:30

## 2021-08-18 NOTE — PROGRESS NOTE ADULT - PROBLEM SELECTOR PLAN 1
Fever and tachycardia with +BCx c/w severe sepsis 2/2 acute pyelonephritis   - s/p 2L NS  - s/p 1g ceftriaxone  - c/w ceftriaxone 1g q12h, no risk factors for MDR organisms  - plan for switch to oral abx with sensitivities

## 2021-08-18 NOTE — PROGRESS NOTE ADULT - SUBJECTIVE AND OBJECTIVE BOX
Catalina Vincent, PGY-2  Internal Medicine  Pager: [phone]/WENDI [phone]    PROGRESS NOTE:     Patient is a 46y old  Female who presents with a chief complaint of Sepsis (17 Aug 2021 17:01)      SUBJECTIVE / OVERNIGHT EVENTS:    ADDITIONAL REVIEW OF SYSTEMS:    MEDICATIONS  (STANDING):  cefTRIAXone   IVPB 1000 milliGRAM(s) IV Intermittent every 12 hours    MEDICATIONS  (PRN):  acetaminophen   Tablet .. 650 milliGRAM(s) Oral every 6 hours PRN Temp greater or equal to 38C (100.4F), Mild Pain (1 - 3)      CAPILLARY BLOOD GLUCOSE        I&O's Summary    17 Aug 2021 07:01  -  18 Aug 2021 07:00  --------------------------------------------------------  IN: 50 mL / OUT: 0 mL / NET: 50 mL        PHYSICAL EXAM:  Vital Signs Last 24 Hrs  T(C): 36.9 (18 Aug 2021 04:44), Max: 37.1 (17 Aug 2021 07:57)  T(F): 98.5 (18 Aug 2021 04:44), Max: 98.7 (17 Aug 2021 07:57)  HR: 86 (18 Aug 2021 04:44) (83 - 98)  BP: 107/76 (18 Aug 2021 04:44) (100/61 - 109/80)  BP(mean): --  RR: 16 (18 Aug 2021 04:44) (16 - 19)  SpO2: 98% (18 Aug 2021 04:44) (97% - 100%)    CONSTITUTIONAL: NAD, well-developed  RESPIRATORY: Normal respiratory effort; lungs are clear to auscultation bilaterally  CARDIOVASCULAR: Regular rate and rhythm, normal S1 and S2, no murmur/rub/gallop; No lower extremity edema; Peripheral pulses are 2+ bilaterally  ABDOMEN: Nontender to palpation, normoactive bowel sounds, no rebound/guarding; No hepatosplenomegaly  MUSCLOSKELETAL: no clubbing or cyanosis of digits; no joint swelling or tenderness to palpation  PSYCH: A+O to person, place, and time; affect appropriate    LABS:                        10.3   8.70  )-----------( 195      ( 17 Aug 2021 07:04 )             32.7     08-17    139  |  110<H>  |  5<L>  ----------------------------<  105<H>  3.4<L>   |  20<L>  |  0.43<L>    Ca    7.7<L>      17 Aug 2021 07:04    TPro  8.0  /  Alb  4.3  /  TBili  0.7  /  DBili  x   /  AST  23  /  ALT  9<L>  /  AlkPhos  73  08-16          Urinalysis Basic - ( 16 Aug 2021 15:02 )    Color: Yellow / Appearance: Slightly Turbid / S.018 / pH: x  Gluc: x / Ketone: Negative  / Bili: Negative / Urobili: Negative   Blood: x / Protein: 30 mg/dL / Nitrite: Negative   Leuk Esterase: Large / RBC: 2 /hpf /  /HPF   Sq Epi: x / Non Sq Epi: 2 /hpf / Bacteria: Many        Culture - Urine (collected 16 Aug 2021 22:18)  Source: Clean Catch Clean Catch (Midstream)  Preliminary Report (17 Aug 2021 22:00):    >100,000 CFU/ml Escherichia coli    Culture - Blood (collected 16 Aug 2021 17:47)  Source: .Blood Blood  Preliminary Report (17 Aug 2021 18:02):    No growth to date.    Culture - Blood (collected 16 Aug 2021 14:27)  Source: .Blood Blood  Gram Stain (17 Aug 2021 03:01):    Growth in aerobic bottle: Gram Negative Rods    Growth in anaerobic bottle: Gram Negative Rods  Preliminary Report (17 Aug 2021 21:03):    Growth in aerobic and anaerobic bottles: Escherichia coli    ***Blood Panel PCR results on this specimen are available    approximately 3 hours after the Gram stain result.***    Gram stain, PCR, and/or culture results may not always    correspond due to difference in methodologies.    ************************************************************    This PCR assay was performed by multiplex PCR. This    Assay tests for 66 bacterial and resistance gene targets.    Please refer to the [facility] test directory    at [email or web address] for details.  Organism: Blood Culture PCR (17 Aug 2021 03:40)  Organism: Blood Culture PCR (17 Aug 2021 03:40)        RADIOLOGY & ADDITIONAL TESTS:  Results Reviewed:   Imaging Personally Reviewed:  Electrocardiogram Personally Reviewed:    COORDINATION OF CARE:  Care Discussed with Consultants/Other Providers [Y/N]:  Prior or Outpatient Records Reviewed [Y/N]:   Catalina Vincent, PGY-2  Internal Medicine  Pager: [phone]/JACKELINE [phone]    PROGRESS NOTE:     Patient is a 46y old  Female who presents with a chief complaint of Sepsis (17 Aug 2021 17:01)      SUBJECTIVE / OVERNIGHT EVENTS:  No events overnight. Patient offers no complaints. Improving abd  ADDITIONAL REVIEW OF SYSTEMS:    MEDICATIONS  (STANDING):  cefTRIAXone   IVPB 1000 milliGRAM(s) IV Intermittent every 12 hours    MEDICATIONS  (PRN):  acetaminophen   Tablet .. 650 milliGRAM(s) Oral every 6 hours PRN Temp greater or equal to 38C (100.4F), Mild Pain (1 - 3)      CAPILLARY BLOOD GLUCOSE        I&O's Summary    17 Aug 2021 07:01  -  18 Aug 2021 07:00  --------------------------------------------------------  IN: 50 mL / OUT: 0 mL / NET: 50 mL        PHYSICAL EXAM:  Vital Signs Last 24 Hrs  T(C): 36.9 (18 Aug 2021 04:44), Max: 37.1 (17 Aug 2021 07:57)  T(F): 98.5 (18 Aug 2021 04:44), Max: 98.7 (17 Aug 2021 07:57)  HR: 86 (18 Aug 2021 04:44) (83 - 98)  BP: 107/76 (18 Aug 2021 04:44) (100/61 - 109/80)  BP(mean): --  RR: 16 (18 Aug 2021 04:44) (16 - 19)  SpO2: 98% (18 Aug 2021 04:44) (97% - 100%)    CONSTITUTIONAL: NAD, well-developed  RESPIRATORY: Normal respiratory effort; lungs are clear to auscultation bilaterally  CARDIOVASCULAR: Regular rate and rhythm, normal S1 and S2, no murmur/rub/gallop; No lower extremity edema; Peripheral pulses are 2+ bilaterally  ABDOMEN: Nontender to palpation, normoactive bowel sounds, no rebound/guarding; No hepatosplenomegaly  MUSCLOSKELETAL: no clubbing or cyanosis of digits; no joint swelling or tenderness to palpation  PSYCH: A+O to person, place, and time; affect appropriate    LABS:                        10.3   8.70  )-----------( 195      ( 17 Aug 2021 07:04 )             32.7     08-17    139  |  110<H>  |  5<L>  ----------------------------<  105<H>  3.4<L>   |  20<L>  |  0.43<L>    Ca    7.7<L>      17 Aug 2021 07:04    TPro  8.0  /  Alb  4.3  /  TBili  0.7  /  DBili  x   /  AST  23  /  ALT  9<L>  /  AlkPhos  73  08-16          Urinalysis Basic - ( 16 Aug 2021 15:02 )    Color: Yellow / Appearance: Slightly Turbid / S.018 / pH: x  Gluc: x / Ketone: Negative  / Bili: Negative / Urobili: Negative   Blood: x / Protein: 30 mg/dL / Nitrite: Negative   Leuk Esterase: Large / RBC: 2 /hpf /  /HPF   Sq Epi: x / Non Sq Epi: 2 /hpf / Bacteria: Many        Culture - Urine (collected 16 Aug 2021 22:18)  Source: Clean Catch Clean Catch (Midstream)  Preliminary Report (17 Aug 2021 22:00):    >100,000 CFU/ml Escherichia coli    Culture - Blood (collected 16 Aug 2021 17:47)  Source: .Blood Blood  Preliminary Report (17 Aug 2021 18:02):    No growth to date.    Culture - Blood (collected 16 Aug 2021 14:27)  Source: .Blood Blood  Gram Stain (17 Aug 2021 03:01):    Growth in aerobic bottle: Gram Negative Rods    Growth in anaerobic bottle: Gram Negative Rods  Preliminary Report (17 Aug 2021 21:03):    Growth in aerobic and anaerobic bottles: Escherichia coli    ***Blood Panel PCR results on this specimen are available    approximately 3 hours after the Gram stain result.***    Gram stain, PCR, and/or culture results may not always    correspond due to difference in methodologies.    ************************************************************    This PCR assay was performed by multiplex PCR. This    Assay tests for 66 bacterial and resistance gene targets.    Please refer to the [facility] test directory    at [email or web address] for details.  Organism: Blood Culture PCR (17 Aug 2021 03:40)  Organism: Blood Culture PCR (17 Aug 2021 03:40)        RADIOLOGY & ADDITIONAL TESTS:  Results Reviewed:   Imaging Personally Reviewed:  Electrocardiogram Personally Reviewed:    COORDINATION OF CARE:  Care Discussed with Consultants/Other Providers [Y/N]:  Prior or Outpatient Records Reviewed [Y/N]:   Catalina Vincent, PGY-2  Internal Medicine  Pager: [phone]/JACKELINE [phone]    PROGRESS NOTE:     Patient is a 46y old  Female who presents with a chief complaint of Sepsis (17 Aug 2021 17:01)      SUBJECTIVE / OVERNIGHT EVENTS: No acute events overnight. Pt seen and examined at bedside. Denies fever, chills, nausea, vomiting, cp, or sob.   ADDITIONAL REVIEW OF SYSTEMS: Endorses RUQ abdominal pain, improving. No constipation, diarrhea, vomiting.     MEDICATIONS  (STANDING):  cefTRIAXone   IVPB 1000 milliGRAM(s) IV Intermittent every 12 hours    MEDICATIONS  (PRN):  acetaminophen   Tablet .. 650 milliGRAM(s) Oral every 6 hours PRN Temp greater or equal to 38C (100.4F), Mild Pain (1 - 3)      CAPILLARY BLOOD GLUCOSE        I&O's Summary    17 Aug 2021 07:01  -  18 Aug 2021 07:00  --------------------------------------------------------  IN: 50 mL / OUT: 0 mL / NET: 50 mL        PHYSICAL EXAM:  Vital Signs Last 24 Hrs  T(C): 36.9 (18 Aug 2021 04:44), Max: 37.1 (17 Aug 2021 07:57)  T(F): 98.5 (18 Aug 2021 04:44), Max: 98.7 (17 Aug 2021 07:57)  HR: 86 (18 Aug 2021 04:44) (83 - 98)  BP: 107/76 (18 Aug 2021 04:44) (100/61 - 109/80)  BP(mean): --  RR: 16 (18 Aug 2021 04:44) (16 - 19)  SpO2: 98% (18 Aug 2021 04:44) (97% - 100%)    CONSTITUTIONAL: NAD, well-developed female  RESPIRATORY: Normal respiratory effort; lungs are clear to auscultation bilaterally  CARDIOVASCULAR: Regular rate and rhythm, normal S1 and S2, no murmur/rub/gallop; No lower extremity edema; Peripheral pulses are 2+ bilaterally  ABDOMEN: Soft, tender to palpation in RUQ, R CVA tenderness, normoactive bowel sounds, no rebound/guarding; No hepatosplenomegaly  MUSCLOSKELETAL: no clubbing or cyanosis of digits; no joint swelling or tenderness to palpation  PSYCH: A+O to person, place, and time; affect appropriate    LABS:                        10.3   8.70  )-----------( 195      ( 17 Aug 2021 07:04 )             32.7     08-17    139  |  110<H>  |  5<L>  ----------------------------<  105<H>  3.4<L>   |  20<L>  |  0.43<L>    Ca    7.7<L>      17 Aug 2021 07:04    TPro  8.0  /  Alb  4.3  /  TBili  0.7  /  DBili  x   /  AST  23  /  ALT  9<L>  /  AlkPhos  73  08-16          Urinalysis Basic - ( 16 Aug 2021 15:02 )    Color: Yellow / Appearance: Slightly Turbid / S.018 / pH: x  Gluc: x / Ketone: Negative  / Bili: Negative / Urobili: Negative   Blood: x / Protein: 30 mg/dL / Nitrite: Negative   Leuk Esterase: Large / RBC: 2 /hpf /  /HPF   Sq Epi: x / Non Sq Epi: 2 /hpf / Bacteria: Many        Culture - Urine (collected 16 Aug 2021 22:18)  Source: Clean Catch Clean Catch (Midstream)  Preliminary Report (17 Aug 2021 22:00):    >100,000 CFU/ml Escherichia coli    Culture - Blood (collected 16 Aug 2021 17:47)  Source: .Blood Blood  Preliminary Report (17 Aug 2021 18:02):    No growth to date.    Culture - Blood (collected 16 Aug 2021 14:27)  Source: .Blood Blood  Gram Stain (17 Aug 2021 03:01):    Growth in aerobic bottle: Gram Negative Rods    Growth in anaerobic bottle: Gram Negative Rods  Preliminary Report (17 Aug 2021 21:03):    Growth in aerobic and anaerobic bottles: Escherichia coli    ***Blood Panel PCR results on this specimen are available    approximately 3 hours after the Gram stain result.***    Gram stain, PCR, and/or culture results may not always    correspond due to difference in methodologies.    ************************************************************    This PCR assay was performed by multiplex PCR. This    Assay tests for 66 bacterial and resistance gene targets.    Please refer to the [facility] test directory    at [email or web address] for details.  Organism: Blood Culture PCR (17 Aug 2021 03:40)  Organism: Blood Culture PCR (17 Aug 2021 03:40)        RADIOLOGY & ADDITIONAL TESTS:  Results Reviewed:   Imaging Personally Reviewed:  Electrocardiogram Personally Reviewed:    COORDINATION OF CARE:  Care Discussed with Consultants/Other Providers [Y/N]:  Prior or Outpatient Records Reviewed [Y/N]:

## 2021-08-18 NOTE — PROGRESS NOTE ADULT - PROBLEM SELECTOR PLAN 2
Gram negative bacteremia 2/2 acute pyelonephritis  - f/u BCx speciation and sensitivity  - f/u UCx speciation and sensitivity  - f/u repeat BCx  - c/w abx as above Gram negative bacteremia 2/2 acute pyelonephritis  - f/u BCx speciation and sensitivity  - f/u UCx speciation and sensitivity  - f/u repeat BCx for clearance  - c/w abx as above

## 2021-08-18 NOTE — PROGRESS NOTE ADULT - PROBLEM SELECTOR PLAN 3
CT AP with evidence of ascending urinary infection c/w acute pyelonephritis  - f/u daily CMP to monitor renal function  - c/w abx as above  - Urology c/s for potential ureter stricture or obstruction CT AP with evidence of ascending urinary infection c/w acute pyelonephritis  - f/u daily CMP to monitor renal function  - c/w abx as above  - CT Urogram for potential ureter stricture or obstruction  - F/u urology for possible cystoscopy

## 2021-08-19 ENCOUNTER — TRANSCRIPTION ENCOUNTER (OUTPATIENT)
Age: 46
End: 2021-08-19

## 2021-08-19 VITALS
TEMPERATURE: 98 F | DIASTOLIC BLOOD PRESSURE: 69 MMHG | HEART RATE: 90 BPM | SYSTOLIC BLOOD PRESSURE: 104 MMHG | OXYGEN SATURATION: 95 % | RESPIRATION RATE: 18 BRPM

## 2021-08-19 LAB
-  STAPHYLOCOCCUS EPIDERMIDIS: SIGNIFICANT CHANGE UP
ANION GAP SERPL CALC-SCNC: 13 MMOL/L — SIGNIFICANT CHANGE UP (ref 5–17)
BUN SERPL-MCNC: 13 MG/DL — SIGNIFICANT CHANGE UP (ref 7–23)
CALCIUM SERPL-MCNC: 9.5 MG/DL — SIGNIFICANT CHANGE UP (ref 8.4–10.5)
CHLORIDE SERPL-SCNC: 101 MMOL/L — SIGNIFICANT CHANGE UP (ref 96–108)
CO2 SERPL-SCNC: 20 MMOL/L — LOW (ref 22–31)
CREAT SERPL-MCNC: 0.52 MG/DL — SIGNIFICANT CHANGE UP (ref 0.5–1.3)
GLUCOSE SERPL-MCNC: 93 MG/DL — SIGNIFICANT CHANGE UP (ref 70–99)
GRAM STN FLD: SIGNIFICANT CHANGE UP
HCT VFR BLD CALC: 38.1 % — SIGNIFICANT CHANGE UP (ref 34.5–45)
HGB BLD-MCNC: 12.5 G/DL — SIGNIFICANT CHANGE UP (ref 11.5–15.5)
MAGNESIUM SERPL-MCNC: 2.2 MG/DL — SIGNIFICANT CHANGE UP (ref 1.6–2.6)
MCHC RBC-ENTMCNC: 26.9 PG — LOW (ref 27–34)
MCHC RBC-ENTMCNC: 32.8 GM/DL — SIGNIFICANT CHANGE UP (ref 32–36)
MCV RBC AUTO: 82.1 FL — SIGNIFICANT CHANGE UP (ref 80–100)
METHOD TYPE: SIGNIFICANT CHANGE UP
NRBC # BLD: 0 /100 WBCS — SIGNIFICANT CHANGE UP (ref 0–0)
PHOSPHATE SERPL-MCNC: 4 MG/DL — SIGNIFICANT CHANGE UP (ref 2.5–4.5)
PLATELET # BLD AUTO: 292 K/UL — SIGNIFICANT CHANGE UP (ref 150–400)
POTASSIUM SERPL-MCNC: 3.3 MMOL/L — LOW (ref 3.5–5.3)
POTASSIUM SERPL-SCNC: 3.3 MMOL/L — LOW (ref 3.5–5.3)
RBC # BLD: 4.64 M/UL — SIGNIFICANT CHANGE UP (ref 3.8–5.2)
RBC # FLD: 14.8 % — HIGH (ref 10.3–14.5)
SODIUM SERPL-SCNC: 134 MMOL/L — LOW (ref 135–145)
WBC # BLD: 4.83 K/UL — SIGNIFICANT CHANGE UP (ref 3.8–10.5)
WBC # FLD AUTO: 4.83 K/UL — SIGNIFICANT CHANGE UP (ref 3.8–10.5)

## 2021-08-19 PROCEDURE — 80053 COMPREHEN METABOLIC PANEL: CPT

## 2021-08-19 PROCEDURE — 84702 CHORIONIC GONADOTROPIN TEST: CPT

## 2021-08-19 PROCEDURE — 82435 ASSAY OF BLOOD CHLORIDE: CPT

## 2021-08-19 PROCEDURE — 96374 THER/PROPH/DIAG INJ IV PUSH: CPT

## 2021-08-19 PROCEDURE — 74177 CT ABD & PELVIS W/CONTRAST: CPT | Mod: MA

## 2021-08-19 PROCEDURE — 87040 BLOOD CULTURE FOR BACTERIA: CPT

## 2021-08-19 PROCEDURE — 84100 ASSAY OF PHOSPHORUS: CPT

## 2021-08-19 PROCEDURE — 81025 URINE PREGNANCY TEST: CPT

## 2021-08-19 PROCEDURE — 85014 HEMATOCRIT: CPT

## 2021-08-19 PROCEDURE — 82803 BLOOD GASES ANY COMBINATION: CPT

## 2021-08-19 PROCEDURE — 96376 TX/PRO/DX INJ SAME DRUG ADON: CPT

## 2021-08-19 PROCEDURE — 84132 ASSAY OF SERUM POTASSIUM: CPT

## 2021-08-19 PROCEDURE — 74178 CT ABD&PLV WO CNTR FLWD CNTR: CPT

## 2021-08-19 PROCEDURE — U0005: CPT

## 2021-08-19 PROCEDURE — 87186 SC STD MICRODIL/AGAR DIL: CPT

## 2021-08-19 PROCEDURE — 84295 ASSAY OF SERUM SODIUM: CPT

## 2021-08-19 PROCEDURE — 83605 ASSAY OF LACTIC ACID: CPT

## 2021-08-19 PROCEDURE — U0003: CPT

## 2021-08-19 PROCEDURE — 99285 EMERGENCY DEPT VISIT HI MDM: CPT

## 2021-08-19 PROCEDURE — 82947 ASSAY GLUCOSE BLOOD QUANT: CPT

## 2021-08-19 PROCEDURE — 85025 COMPLETE CBC W/AUTO DIFF WBC: CPT

## 2021-08-19 PROCEDURE — 86769 SARS-COV-2 COVID-19 ANTIBODY: CPT

## 2021-08-19 PROCEDURE — 99239 HOSP IP/OBS DSCHRG MGMT >30: CPT | Mod: GC

## 2021-08-19 PROCEDURE — 87150 DNA/RNA AMPLIFIED PROBE: CPT

## 2021-08-19 PROCEDURE — 85018 HEMOGLOBIN: CPT

## 2021-08-19 PROCEDURE — 85027 COMPLETE CBC AUTOMATED: CPT

## 2021-08-19 PROCEDURE — 83735 ASSAY OF MAGNESIUM: CPT

## 2021-08-19 PROCEDURE — 81001 URINALYSIS AUTO W/SCOPE: CPT

## 2021-08-19 PROCEDURE — 87077 CULTURE AEROBIC IDENTIFY: CPT

## 2021-08-19 PROCEDURE — 82330 ASSAY OF CALCIUM: CPT

## 2021-08-19 PROCEDURE — 93005 ELECTROCARDIOGRAM TRACING: CPT

## 2021-08-19 PROCEDURE — 96375 TX/PRO/DX INJ NEW DRUG ADDON: CPT

## 2021-08-19 PROCEDURE — 80048 BASIC METABOLIC PNL TOTAL CA: CPT

## 2021-08-19 PROCEDURE — 87086 URINE CULTURE/COLONY COUNT: CPT

## 2021-08-19 RX ORDER — CEFPODOXIME PROXETIL 100 MG
1 TABLET ORAL
Qty: 12 | Refills: 0
Start: 2021-08-19 | End: 2021-08-24

## 2021-08-19 RX ORDER — CEFPODOXIME PROXETIL 100 MG
200 TABLET ORAL EVERY 12 HOURS
Refills: 0 | Status: DISCONTINUED | OUTPATIENT
Start: 2021-08-19 | End: 2021-08-19

## 2021-08-19 RX ORDER — IBUPROFEN 200 MG
400 TABLET ORAL ONCE
Refills: 0 | Status: COMPLETED | OUTPATIENT
Start: 2021-08-19 | End: 2021-08-19

## 2021-08-19 RX ORDER — POTASSIUM CHLORIDE 20 MEQ
40 PACKET (EA) ORAL EVERY 4 HOURS
Refills: 0 | Status: COMPLETED | OUTPATIENT
Start: 2021-08-19 | End: 2021-08-19

## 2021-08-19 RX ADMIN — CEFTRIAXONE 100 MILLIGRAM(S): 500 INJECTION, POWDER, FOR SOLUTION INTRAMUSCULAR; INTRAVENOUS at 01:38

## 2021-08-19 RX ADMIN — Medication 400 MILLIGRAM(S): at 11:10

## 2021-08-19 RX ADMIN — HEPARIN SODIUM 5000 UNIT(S): 5000 INJECTION INTRAVENOUS; SUBCUTANEOUS at 05:29

## 2021-08-19 RX ADMIN — Medication 400 MILLIGRAM(S): at 10:40

## 2021-08-19 RX ADMIN — Medication 40 MILLIEQUIVALENT(S): at 10:41

## 2021-08-19 RX ADMIN — Medication 40 MILLIEQUIVALENT(S): at 12:45

## 2021-08-19 NOTE — DISCHARGE NOTE NURSING/CASE MANAGEMENT/SOCIAL WORK - NSDCPEFALRISK_GEN_ALL_CORE
For information on Fall & injury Prevention, visit [email or web address]

## 2021-08-19 NOTE — PROGRESS NOTE ADULT - SUBJECTIVE AND OBJECTIVE BOX
ALTONELENI KALIE  46y  Female    Patient is a 46y old  Female who presents with a chief complaint of Sepsis (18 Aug 2021 07:36)      INTERVAL HPI/OVERNIGHT EVENTS:        T(C): 36.9 (08-19-21 @ 04:17), Max: 37.1 (08-18-21 @ 13:51)  HR: 90 (08-18-21 @ 20:52) (86 - 90)  BP: 102/72 (08-19-21 @ 04:17) (102/72 - 112/78)  RR: 18 (08-19-21 @ 04:17) (17 - 18)  SpO2: 96% (08-19-21 @ 04:17) (96% - 98%)  Wt(kg): --Vital Signs Last 24 Hrs  T(C): 36.9 (19 Aug 2021 04:17), Max: 37.1 (18 Aug 2021 13:51)  T(F): 98.4 (19 Aug 2021 04:17), Max: 98.8 (18 Aug 2021 13:51)  HR: 90 (18 Aug 2021 20:52) (86 - 90)  BP: 102/72 (19 Aug 2021 04:17) (102/72 - 112/78)  BP(mean): --  RR: 18 (19 Aug 2021 04:17) (17 - 18)  SpO2: 96% (19 Aug 2021 04:17) (96% - 98%)    PHYSICAL EXAM:  GENERAL: NAD, well-groomed, well-developed  HEAD:  Atraumatic, normocephalic  EYES: EOMI, PERRLA, conjunctiva and sclera clear  ENMT: No tonsillar erythema, exudates, or enlargement; Moist mucous membranes, good dentition, no lesions  NECK: Supple, no JVD, normal thyroid  NERVOUS SYSTEM:  Alert & oriented X3, good concentration, motor strength 5/5 B/L upper and lower extremities  CHEST/LUNG: Clear to auscultation bilaterally, no rales, rhonchi, or wheezes  HEART: Regular rate and rhythm, no murmurs, rubs, or gallops  ABDOMEN: Soft, nontender, nondistended, bowel sounds present  EXTREMITIES:  2+ peripheral pulses, no clubbing, cyanosis, or edema  LYMPH: No lymphadenopathy noted  SKIN: No rashes or lesions    Consultant(s) Notes Reviewed:  [x ] YES  [ ] NO  Care Discussed with Consultants/Other Providers [ x] YES  [ ] NO    LABS:                        11.0   5.18  )-----------( 204      ( 18 Aug 2021 07:11 )             34.1     08-18    138  |  108  |  6<L>  ----------------------------<  95  3.7   |  20<L>  |  0.39<L>    Ca    8.5      18 Aug 2021 07:06  Phos  2.5     08-18  Mg     2.2     08-18          CAPILLARY BLOOD GLUCOSE                RADIOLOGY & ADDITIONAL TESTS:    Imaging Personally Reviewed:  [ ] YES  [ ] NO KALIE SAMPSON  46y  Female    Patient is a 46y old  Female who presents with a chief complaint of Sepsis (18 Aug 2021 07:36)    INTERVAL HPI/OVERNIGHT EVENTS:    Abdominal and flank pain resolved. New dull b/l frontal headache c/w tension headache iso lack of sleep last night. No dysuria, frequency, feeling of fullness, or change in urine color. Feels like abdominal swelling has reduced.    T(C): 36.9 (08-19-21 @ 04:17), Max: 37.1 (08-18-21 @ 13:51)  HR: 90 (08-18-21 @ 20:52) (86 - 90)  BP: 102/72 (08-19-21 @ 04:17) (102/72 - 112/78)  RR: 18 (08-19-21 @ 04:17) (17 - 18)  SpO2: 96% (08-19-21 @ 04:17) (96% - 98%)  Wt(kg): --Vital Signs Last 24 Hrs  T(C): 36.9 (19 Aug 2021 04:17), Max: 37.1 (18 Aug 2021 13:51)  T(F): 98.4 (19 Aug 2021 04:17), Max: 98.8 (18 Aug 2021 13:51)  HR: 90 (18 Aug 2021 20:52) (86 - 90)  BP: 102/72 (19 Aug 2021 04:17) (102/72 - 112/78)  BP(mean): --  RR: 18 (19 Aug 2021 04:17) (17 - 18)  SpO2: 96% (19 Aug 2021 04:17) (96% - 98%)    PHYSICAL EXAM:  GENERAL: NAD, well-groomed, well-developed  HEAD:  Atraumatic, normocephalic  EYES: EOMI, PERRLA, conjunctiva and sclera clear  ENMT: No tonsillar erythema, exudates, or enlargement; Moist mucous membranes, good dentition, no lesions  NECK: Supple, no JVD, normal thyroid  NERVOUS SYSTEM:  Alert & oriented X3, good concentration, motor strength 5/5 B/L upper and lower extremities  CHEST/LUNG: Clear to auscultation bilaterally, no rales, rhonchi, or wheezes  HEART: Regular rate and rhythm, no murmurs, rubs, or gallops  ABDOMEN: Soft, nontender, nondistended, bowel sounds present, negative CVAT  EXTREMITIES:  2+ peripheral pulses, no clubbing, cyanosis, or edema  LYMPH: No lymphadenopathy noted  SKIN: No rashes or lesions    Consultant(s) Notes Reviewed:  [x ] YES  [ ] NO  Care Discussed with Consultants/Other Providers [ x] YES  [ ] NO    LABS:                        11.0   5.18  )-----------( 204      ( 18 Aug 2021 07:11 )             34.1     08-18    138  |  108  |  6<L>  ----------------------------<  95  3.7   |  20<L>  |  0.39<L>    Ca    8.5      18 Aug 2021 07:06  Phos  2.5     08-18  Mg     2.2     08-18    CAPILLARY BLOOD GLUCOSE    RADIOLOGY & ADDITIONAL TESTS:    CT AP Urogram:    Imaging Personally Reviewed:  [x ] YES  [ ] NO KALIE SAMPSON  46y  Female    Patient is a 46y old  Female who presents with a chief complaint of Sepsis (18 Aug 2021 07:36)    INTERVAL HPI/OVERNIGHT EVENTS:    Abdominal and flank pain resolved. New dull b/l frontal headache c/w tension headache iso lack of sleep last night. No dysuria, frequency, feeling of fullness, or change in urine color. Feels like abdominal swelling has reduced.    T(C): 36.9 (08-19-21 @ 04:17), Max: 37.1 (08-18-21 @ 13:51)  HR: 90 (08-18-21 @ 20:52) (86 - 90)  BP: 102/72 (08-19-21 @ 04:17) (102/72 - 112/78)  RR: 18 (08-19-21 @ 04:17) (17 - 18)  SpO2: 96% (08-19-21 @ 04:17) (96% - 98%)  Wt(kg): --Vital Signs Last 24 Hrs  T(C): 36.9 (19 Aug 2021 04:17), Max: 37.1 (18 Aug 2021 13:51)  T(F): 98.4 (19 Aug 2021 04:17), Max: 98.8 (18 Aug 2021 13:51)  HR: 90 (18 Aug 2021 20:52) (86 - 90)  BP: 102/72 (19 Aug 2021 04:17) (102/72 - 112/78)  BP(mean): --  RR: 18 (19 Aug 2021 04:17) (17 - 18)  SpO2: 96% (19 Aug 2021 04:17) (96% - 98%)    PHYSICAL EXAM:  GENERAL: NAD, well-groomed, well-developed  HEAD:  Atraumatic, normocephalic  EYES: EOMI, PERRLA, conjunctiva and sclera clear  ENMT: No tonsillar erythema, exudates, or enlargement; Moist mucous membranes, good dentition, no lesions  NECK: Supple, no JVD, normal thyroid  NERVOUS SYSTEM:  Alert & oriented X3, good concentration, motor strength 5/5 B/L upper and lower extremities  CHEST/LUNG: Clear to auscultation bilaterally, no rales, rhonchi, or wheezes  HEART: Regular rate and rhythm, no murmurs, rubs, or gallops  ABDOMEN: Soft, nontender, nondistended, bowel sounds present, negative CVAT  EXTREMITIES:  2+ peripheral pulses, no clubbing, cyanosis, or edema  LYMPH: No lymphadenopathy noted  SKIN: No rashes or lesions    Consultant(s) Notes Reviewed:  [x ] YES  [ ] NO  Care Discussed with Consultants/Other Providers [ x] YES  [ ] NO    LABS:                        11.0   5.18  )-----------( 204      ( 18 Aug 2021 07:11 )             34.1     08-18    138  |  108  |  6<L>  ----------------------------<  95  3.7   |  20<L>  |  0.39<L>    Ca    8.5      18 Aug 2021 07:06  Phos  2.5     08-18  Mg     2.2     08-18    CAPILLARY BLOOD GLUCOSE    RADIOLOGY & ADDITIONAL TESTS:    CT AP Urogram:  Right pyelonephritis.    No evidence of ureteral lesion or postrenal obstruction.    Imaging Personally Reviewed:  [x ] YES  [ ] NO

## 2021-08-19 NOTE — DISCHARGE NOTE NURSING/CASE MANAGEMENT/SOCIAL WORK - NSTOBACCOREFERRAL_GEN_A_NCS
From: Dash Luna RN  To: Arcenio Stanton  Sent: 3/2/2017 1:37 PM CST  Subject: Results    Good Afternoon,   I know we have been playing phone tag so I thought sending you a [employer] message might be easier.  Dr. Silverio Acharya received your thyroid levels and Patient declined information

## 2021-08-19 NOTE — PROGRESS NOTE ADULT - ATTENDING COMMENTS
Patient seen and evaluated at bedside. Patient only complaint is 4/10 headache, pressure in nature, bitemporal.  No fever, chills, flank tenderness, dysuria. Labs normalized. Physical exam withotu CVA tenderness. CT urogram reviewed, no stricture noted.     #E. Coli Bacteremia in setting of pyelonephritis  -patient with 72 hours of IV Abx. Bcx slightly discrepant from UCx with cipro being indetermijnant. Discussed personally with ID pharmacist who recommended cefpodoxime 200mg BID for more days to complete a 10 day course.       Patient is stable for discharge with above Abx regimen.     d/c planning 36 minutes.

## 2021-08-19 NOTE — PROGRESS NOTE ADULT - ASSESSMENT
Ms. Torres is a 47yo F with no significant PMH presenting with one week of dysuria and urgency with fever, chills and right sided abdominal and flank pain for 3 days concerning for sepsis with gram negative bacteremia 2/2 acute pyelonephritis.
Ms. Torres is a 45yo F with no significant PMH presenting with one week of dysuria and urgency with fever, chills and right sided abdominal and flank pain for 3 days concerning for sepsis with gram negative bacteremia 2/2 acute pyelonephritis.

## 2021-08-19 NOTE — DISCHARGE NOTE NURSING/CASE MANAGEMENT/SOCIAL WORK - PATIENT PORTAL LINK FT
You can access the FollowMyHealth Patient Portal offered by [facility] by registering at the following website: [email or web address]. By joining [employer] [facility]’s FollowMyHealth portal, you will also be able to view your health information using other applications (apps) compatible with our system.

## 2021-08-19 NOTE — PROGRESS NOTE ADULT - PROBLEM SELECTOR PLAN 2
Gram negative bacteremia 2/2 acute pyelonephritis  - f/u BCx speciation and sensitivity  - f/u UCx speciation and sensitivity  - f/u repeat BCx for clearance  - c/w abx as above Gram negative bacteremia 2/2 acute pyelonephritis  - BCx E. coli, ceftriaxone sensitive  - UCx E. coli, ceftriaxone sensitive, Cipro intermediate resistance  - repeat BCx with contaminant S. epi  - c/w abx as above

## 2021-08-19 NOTE — PROGRESS NOTE ADULT - PROBLEM SELECTOR PLAN 1
Fever and tachycardia with +BCx c/w severe sepsis 2/2 acute pyelonephritis   - s/p 2L NS  - s/p 1g ceftriaxone  - c/w ceftriaxone 1g q12h, no risk factors for MDR organisms  - plan for switch to oral abx with sensitivities Fever and tachycardia with +BCx c/w severe sepsis 2/2 acute pyelonephritis   - s/p 2L NS  - s/p 1g ceftriaxone  - last dose of ceftriaxone today  - plan for switch to oral abx with sensitivities Fever and tachycardia with +BCx c/w severe sepsis 2/2 acute pyelonephritis   - s/p 2L NS  - s/p 1g ceftriaxone  - last dose of ceftriaxone today  - plan for switch to cefpodoxime 200 po bid for 6 days tomorrow, non-inferior per Rohan et al. [employer]. 2020 Fever and tachycardia with +BCx c/w severe sepsis 2/2 acute pyelonephritis   - s/p 2L NS  - s/p 1g ceftriaxone in ED  - last dose of ceftriaxone today  - plan for switch to cefpodoxime 200 po bid for 6 days tomorrow, non-inferior to FQs and Bactrim per Rohan et al. [employer]. 2020

## 2021-08-19 NOTE — PROGRESS NOTE ADULT - PROBLEM SELECTOR PLAN 4
Febrile iso bacteremia and acute pyelonephritis  - c/w Tylenol 650 q6h
Febrile iso bacteremia and acute pyelonephritis  - c/w Tylenol 650 q6h

## 2021-08-19 NOTE — PROGRESS NOTE ADULT - PROBLEM SELECTOR PLAN 3
CT AP with evidence of ascending urinary infection c/w acute pyelonephritis  - f/u daily CMP to monitor renal function  - c/w abx as above  - CT Urogram for potential ureter stricture or obstruction  - F/u urology for possible cystoscopy CT AP with evidence of ascending urinary infection c/w acute pyelonephritis  - f/u daily CMP to monitor renal function  - c/w abx as above  - f/u CT Urogram  - f/u urology recs CT AP with evidence of ascending urinary infection c/w acute pyelonephritis  - f/u daily CMP to monitor renal function  - c/w abx as above  - CT Urogram, no ureteral lesion or postrenal obstruction  - f/u urology recs CT AP with evidence of ascending urinary infection c/w acute pyelonephritis  - f/u daily CMP to monitor renal function  - c/w abx as above  - CT Urogram, no ureteral lesion or postrenal obstruction  - Urology recs appreciated, no contraindications to discharge

## 2021-08-20 ENCOUNTER — NON-APPOINTMENT (OUTPATIENT)
Age: 46
End: 2021-08-20

## 2021-08-20 LAB
CULTURE RESULTS: SIGNIFICANT CHANGE UP
ORGANISM # SPEC MICROSCOPIC CNT: SIGNIFICANT CHANGE UP
ORGANISM # SPEC MICROSCOPIC CNT: SIGNIFICANT CHANGE UP
SPECIMEN SOURCE: SIGNIFICANT CHANGE UP

## 2021-08-20 RX ORDER — CEFPODOXIME PROXETIL 100 MG
1 TABLET ORAL
Qty: 12 | Refills: 0
Start: 2021-08-20 | End: 2021-08-25

## 2021-08-20 RX ORDER — CEFPODOXIME PROXETIL 100 MG
1 TABLET ORAL
Qty: 0 | Refills: 0 | DISCHARGE
Start: 2021-08-20 | End: 2021-08-25

## 2021-08-21 LAB
CULTURE RESULTS: SIGNIFICANT CHANGE UP
SPECIMEN SOURCE: SIGNIFICANT CHANGE UP

## 2021-08-26 ENCOUNTER — OUTPATIENT (OUTPATIENT)
Dept: OUTPATIENT SERVICES | Facility: HOSPITAL | Age: 46
LOS: 1 days | End: 2021-08-26
Payer: SELF-PAY

## 2021-08-26 ENCOUNTER — APPOINTMENT (OUTPATIENT)
Dept: INTERNAL MEDICINE | Facility: CLINIC | Age: 46
End: 2021-08-26

## 2021-08-26 ENCOUNTER — LABORATORY RESULT (OUTPATIENT)
Age: 46
End: 2021-08-26

## 2021-08-26 VITALS
HEART RATE: 83 BPM | WEIGHT: 120 LBS | DIASTOLIC BLOOD PRESSURE: 60 MMHG | OXYGEN SATURATION: 99 % | HEIGHT: 65 IN | BODY MASS INDEX: 19.99 KG/M2 | SYSTOLIC BLOOD PRESSURE: 110 MMHG

## 2021-08-26 DIAGNOSIS — N12 TUBULO-INTERSTITIAL NEPHRITIS, NOT SPECIFIED AS ACUTE OR CHRONIC: ICD-10-CM

## 2021-08-26 DIAGNOSIS — I10 ESSENTIAL (PRIMARY) HYPERTENSION: ICD-10-CM

## 2021-08-26 PROCEDURE — G0463: CPT

## 2021-08-27 LAB
APPEARANCE UR: CLEAR — SIGNIFICANT CHANGE UP
BILIRUB UR-MCNC: NEGATIVE — SIGNIFICANT CHANGE UP
COLOR SPEC: YELLOW — SIGNIFICANT CHANGE UP
DIFF PNL FLD: NEGATIVE — SIGNIFICANT CHANGE UP
GLUCOSE UR QL: NEGATIVE — SIGNIFICANT CHANGE UP
KETONES UR-MCNC: NEGATIVE — SIGNIFICANT CHANGE UP
LEUKOCYTE ESTERASE UR-ACNC: NEGATIVE — SIGNIFICANT CHANGE UP
NITRITE UR-MCNC: NEGATIVE — SIGNIFICANT CHANGE UP
PH UR: 5.5 — SIGNIFICANT CHANGE UP (ref 5–8)
PROT UR-MCNC: SIGNIFICANT CHANGE UP
SP GR SPEC: 1.03 — HIGH (ref 1.01–1.02)
UROBILINOGEN FLD QL: SIGNIFICANT CHANGE UP

## 2021-09-03 DIAGNOSIS — N12 TUBULO-INTERSTITIAL NEPHRITIS, NOT SPECIFIED AS ACUTE OR CHRONIC: ICD-10-CM

## 2021-09-03 NOTE — HISTORY OF PRESENT ILLNESS
[Post-hospitalization from ___ Hospital] : Post-hospitalization from [unfilled] Hospital [Admitted on: ___] : The patient was admitted on [unfilled] [Discharged on ___] : discharged on [unfilled] [FreeTextEntry2] : 47yo F with no significant PMH presenting with one week of\par dysuria and urgency with fever, chills and right sided abdominal and flank pain\par for 3 days concerning for sepsis with gram negative bacteremia 2/2 acute\par pyelonephritis. She was initially tachycardic and febrile with clinical\par findings concerning for sepsis and was treated with 2L NS and ceftriaxone in\par the ED. She improved with antibiotic treatment and remained afebrile without\par tachycardia. UA revealed large blood, large leuks, and negative nitrates. BCx\par and UCx grew E. coli sensitive for ceftriaxone, UCx with intermediate\par resistance to Cipro. Initial CT AP revealed right ureter enhancement c/w\par ascending infection with poor visualization of distal ureter. Urology consulted\par for possible stricture or obstruction. Follow up CT Urogram confirmed right\par pyelonephritis with no ureteral lesion or obstruction. Oral cefpodoxime to be\par continued outpatient.\par \par \par Pt now here for f/u after diagnosis of sepsis due to gram negative bacteremia 2/2 R pyelonephritis hospitalization on 8/17. Diagnosis confirmed by CT urogram discharged 19th. bcx and ucx grew e coli sensitive to ceftriaxone, ucx had intermediate resistance to cipro. d/cristiano on oral cefpodoxime 200 mg q 12 for 6 days. \par \par Today pt reporting feeling better but has chronic pain on L side of back near her kidney since 3 weeks ago. She reports that she feels fine when she is sitting but when she bends over or sometimes when she is walking it hurts and this is making her anxious. She also has had diarrhea for the past 2-3 days.\par \par Denies dysuria, hematuria, fever, chills, n/v/c, CP, SOB, orthopnea, melena, hematochezia

## 2021-09-03 NOTE — END OF VISIT
[Time Spent: ___ minutes] : I have spent [unfilled] minutes of time on the encounter. [] : Resident [FreeTextEntry3] : Feeling much improved since hospitalization. All urinary sx resolved  c/o mild diarrhea for couple of days and L sided back pain, mostly with bending. PE unremarkable.  Check UA with reflex cx.  Mild diarrhea - stay well hydrated. Patient advised to call for any worsening or if no resolution.

## 2021-09-03 NOTE — PHYSICAL EXAM
[No Acute Distress] : no acute distress [Well Nourished] : well nourished [Well Developed] : well developed [Well-Appearing] : well-appearing [Normal Sclera/Conjunctiva] : normal sclera/conjunctiva [PERRL] : pupils equal round and reactive to light [EOMI] : extraocular movements intact [Normal Outer Ear/Nose] : the outer ears and nose were normal in appearance [Normal Oropharynx] : the oropharynx was normal [No JVD] : no jugular venous distention [No Lymphadenopathy] : no lymphadenopathy [Supple] : supple [Thyroid Normal, No Nodules] : the thyroid was normal and there were no nodules present [No Respiratory Distress] : no respiratory distress  [No Accessory Muscle Use] : no accessory muscle use [Clear to Auscultation] : lungs were clear to auscultation bilaterally [Normal Rate] : normal rate  [Regular Rhythm] : with a regular rhythm [Normal S1, S2] : normal S1 and S2 [No Murmur] : no murmur heard [No Carotid Bruits] : no carotid bruits [No Abdominal Bruit] : a ~M bruit was not heard ~T in the abdomen [No Varicosities] : no varicosities [Pedal Pulses Present] : the pedal pulses are present [No Edema] : there was no peripheral edema [No Palpable Aorta] : no palpable aorta [No Extremity Clubbing/Cyanosis] : no extremity clubbing/cyanosis [Soft] : abdomen soft [Non-distended] : non-distended [No Masses] : no abdominal mass palpated [No HSM] : no HSM [Normal Bowel Sounds] : normal bowel sounds [Normal Posterior Cervical Nodes] : no posterior cervical lymphadenopathy [Normal Anterior Cervical Nodes] : no anterior cervical lymphadenopathy [No CVA Tenderness] : no CVA  tenderness [No Spinal Tenderness] : no spinal tenderness [No Joint Swelling] : no joint swelling [Grossly Normal Strength/Tone] : grossly normal strength/tone [No Rash] : no rash [Coordination Grossly Intact] : coordination grossly intact [No Focal Deficits] : no focal deficits [Normal Gait] : normal gait [Deep Tendon Reflexes (DTR)] : deep tendon reflexes were 2+ and symmetric [Normal Affect] : the affect was normal [Normal Insight/Judgement] : insight and judgment were intact [de-identified] : mild suprapubic tenderness, otherwise no TTP in all 4 quadrants

## 2021-09-07 ENCOUNTER — EMERGENCY (EMERGENCY)
Facility: HOSPITAL | Age: 46
LOS: 1 days | Discharge: HOME | End: 2021-09-07
Attending: EMERGENCY MEDICINE
Payer: MEDICAID

## 2021-09-07 VITALS
OXYGEN SATURATION: 97 % | HEART RATE: 92 BPM | WEIGHT: 119.93 LBS | DIASTOLIC BLOOD PRESSURE: 71 MMHG | HEIGHT: 60 IN | TEMPERATURE: 99 F | SYSTOLIC BLOOD PRESSURE: 99 MMHG | RESPIRATION RATE: 20 BRPM

## 2021-09-07 LAB
APPEARANCE UR: CLEAR — SIGNIFICANT CHANGE UP
BACTERIA # UR AUTO: ABNORMAL
BILIRUB UR-MCNC: ABNORMAL
COLOR SPEC: ABNORMAL
DIFF PNL FLD: NEGATIVE — SIGNIFICANT CHANGE UP
EPI CELLS # UR: 2 /HPF — SIGNIFICANT CHANGE UP
GLUCOSE UR QL: NEGATIVE — SIGNIFICANT CHANGE UP
HCG UR QL: NEGATIVE — SIGNIFICANT CHANGE UP
HYALINE CASTS # UR AUTO: 0 /LPF — SIGNIFICANT CHANGE UP (ref 0–2)
KETONES UR-MCNC: NEGATIVE — SIGNIFICANT CHANGE UP
LEUKOCYTE ESTERASE UR-ACNC: NEGATIVE — SIGNIFICANT CHANGE UP
NITRITE UR-MCNC: POSITIVE
PH UR: 7 — SIGNIFICANT CHANGE UP (ref 5–8)
PROT UR-MCNC: SIGNIFICANT CHANGE UP
RBC CASTS # UR COMP ASSIST: 1 /HPF — SIGNIFICANT CHANGE UP (ref 0–4)
SP GR SPEC: 1.01 — SIGNIFICANT CHANGE UP (ref 1.01–1.02)
UROBILINOGEN FLD QL: ABNORMAL
WBC UR QL: 3 /HPF — SIGNIFICANT CHANGE UP (ref 0–5)

## 2021-09-07 PROCEDURE — 99283 EMERGENCY DEPT VISIT LOW MDM: CPT

## 2021-09-07 PROCEDURE — 99284 EMERGENCY DEPT VISIT MOD MDM: CPT

## 2021-09-07 PROCEDURE — 87186 SC STD MICRODIL/AGAR DIL: CPT

## 2021-09-07 PROCEDURE — 81001 URINALYSIS AUTO W/SCOPE: CPT

## 2021-09-07 PROCEDURE — 87077 CULTURE AEROBIC IDENTIFY: CPT

## 2021-09-07 PROCEDURE — 87591 N.GONORRHOEAE DNA AMP PROB: CPT

## 2021-09-07 PROCEDURE — 87491 CHLMYD TRACH DNA AMP PROBE: CPT

## 2021-09-07 PROCEDURE — 87086 URINE CULTURE/COLONY COUNT: CPT

## 2021-09-07 PROCEDURE — 81025 URINE PREGNANCY TEST: CPT

## 2021-09-07 RX ORDER — CEFPODOXIME PROXETIL 100 MG
1 TABLET ORAL
Qty: 14 | Refills: 0
Start: 2021-09-07 | End: 2021-09-13

## 2021-09-07 RX ORDER — PHENAZOPYRIDINE HCL 100 MG
200 TABLET ORAL ONCE
Refills: 0 | Status: COMPLETED | OUTPATIENT
Start: 2021-09-07 | End: 2021-09-07

## 2021-09-07 RX ORDER — CEFPODOXIME PROXETIL 100 MG
100 TABLET ORAL ONCE
Refills: 0 | Status: COMPLETED | OUTPATIENT
Start: 2021-09-07 | End: 2021-09-07

## 2021-09-07 RX ORDER — IBUPROFEN 200 MG
600 TABLET ORAL ONCE
Refills: 0 | Status: COMPLETED | OUTPATIENT
Start: 2021-09-07 | End: 2021-09-07

## 2021-09-07 RX ADMIN — Medication 100 MILLIGRAM(S): at 20:46

## 2021-09-07 RX ADMIN — Medication 600 MILLIGRAM(S): at 20:12

## 2021-09-07 RX ADMIN — Medication 200 MILLIGRAM(S): at 20:12

## 2021-09-07 NOTE — ED PROVIDER NOTE - PATIENT PORTAL LINK FT
You can access the FollowMyHealth Patient Portal offered by [facility] by registering at the following website: [email or web address]. By joining [employer]’s FollowMyHealth portal, you will also be able to view your health information using other applications (apps) compatible with our system.

## 2021-09-07 NOTE — ED PROVIDER NOTE - CLINICAL SUMMARY MEDICAL DECISION MAKING FREE TEXT BOX
MARY JANE Schafer MD: 45 y/o female with h/o e. coli bacteremia 2/2 pyelo (last month) who presents with UTI sx s/p coitus. No flank pain, f/c or systemic infection signs/sx. Plan: u/a, ucx, upreg, likely treat for uti with return precautions.

## 2021-09-07 NOTE — ED PROVIDER NOTE - OBJECTIVE STATEMENT
This is a 46 year old female with chief complaint of burning sensation while urinating x2 days. The patient stated that she had sexual intercourse Sunday and started having burning sensation. The patient stated that the sensation does not radiate anywhere and is localized in the suprapubic area. Admits to recent hospitalization for pyelonephritis august 16, 2021. Admits to dysuria and foul smell. Denies any recent illness, abd pain, back pain, SOB, Fever, Chills, N/V/D/C, hematuria.    Vaccinated to COVID

## 2021-09-07 NOTE — ED PROVIDER NOTE - NSFOLLOWUPINSTRUCTIONS_ED_ALL_ED_FT
Hydrate. Be sure to void immediately before and after intercourse to prevent future UTIs.  Cefpodoxime 100mg every 12 hours for 7 days.  Follow up with your PCP for further evaluation in 2-3 days.  Return to ER for new or worsened symptoms including fever, flank pain, abd pain, vomiting or any concern.

## 2021-09-07 NOTE — ED PROVIDER NOTE - CARE PLAN
Assessment and plan of treatment:	Pos-coital dysuria, highly suspicious for UTI  -UA, Ucx, GC/Chlam, UPT  -Abx

## 2021-09-07 NOTE — ED PROVIDER NOTE - CROS ED CONS ALL NEG
184 mg/dL    QC OK? ok    EKG 12 Lead   Result Value Ref Range    Ventricular Rate 117 BPM    Atrial Rate 117 BPM    P-R Interval 140 ms    QRS Duration 72 ms    Q-T Interval 332 ms    QTc Calculation (Bazett) 463 ms    P Axis 45 degrees    R Axis 54 degrees    T Axis 7 degrees    Diagnosis       ** * Pediatric ECG analysis * **  Normal sinus rhythm  Borderline Prolonged QT  No previous ECGs available         EKG (if obtained): (All EKG's are interpreted by myself in the absence of a cardiologist)  Sinus rhythm at 117. Normal axis with good R wave progression. Corrected QT is 463 ms. QRS duration 72 ms. No prior EKG available for comparison. ED Course and MDM:  Patient with appropriate spontaneous respirations on arrival.  Accu-Chek 184. Did establish an IV due to concern for opiate overdose based on EMS report. Shortly after this, patient was again noted to become lethargic with respirations slowing. He was given 1 mg of Narcan intravenously with rapid improvement. No further vital sign changes while in the emergency department. Patient was sleeping on repeat assessment but with appropriate respiratory rate and oxygen saturations. Chemistries, Tylenol and salicylate levels are pending. Maintenance IV fluid  nitiated in the ED. Parents did come to the bedside shortly as did SPD. Will transfer to [facility] for ongoing observation and care. Final Impression:  1. Opiate overdose, undetermined intent, initial encounter [facility])      DISPOSITION Decision To Transfer 07/12/2019 06:08:00 AM      Patient referred to: No follow-up provider specified.   Discharge medications:  New Prescriptions    No medications on file     (Please note that portions of this note may have been completed with a voice recognition program. Efforts were made to edit the dictations but occasionally words are mis-transcribed.)    Bufford Collet, [place],   07/12/19 8023 negative...

## 2021-09-07 NOTE — ED PROVIDER NOTE - NSFOLLOWUPCLINICS_GEN_ALL_ED_FT
[facility] - Urology  Urology  [address], 3rd & 4th [place] [address]  Phone: [phone]  Fax:

## 2021-09-08 ENCOUNTER — NON-APPOINTMENT (OUTPATIENT)
Age: 46
End: 2021-09-08

## 2021-09-10 ENCOUNTER — OUTPATIENT (OUTPATIENT)
Dept: OUTPATIENT SERVICES | Facility: HOSPITAL | Age: 46
LOS: 1 days | End: 2021-09-10
Payer: SELF-PAY

## 2021-09-10 ENCOUNTER — APPOINTMENT (OUTPATIENT)
Dept: INTERNAL MEDICINE | Facility: CLINIC | Age: 46
End: 2021-09-10

## 2021-09-10 DIAGNOSIS — N39.0 URINARY TRACT INFECTION, SITE NOT SPECIFIED: ICD-10-CM

## 2021-09-10 DIAGNOSIS — I10 ESSENTIAL (PRIMARY) HYPERTENSION: ICD-10-CM

## 2021-09-10 PROCEDURE — G0463: CPT

## 2021-09-10 NOTE — HISTORY OF PRESENT ILLNESS
[Patient Declined  Services] : - None: Patient declined  services [Home] : at home, [unfilled] , at the time of the visit. [Medical Office: ([facility])___] : at the medical office located in  [Verbal consent obtained from patient] : the patient, [unfilled] [FreeTextEntry1] : post-ED visit follow up [de-identified] : 46F hx pyelo (hospitalization Aug 2021) presents for post ED-visit for UTI. Presented to ED on 9/7 with dysuria x2d, afebrile, diagnosed with UTI and discharged on Cefpodoxime 100mg every 12 hours for 7 days. She has been taking the medication as prescribed for ~2 days she reports and feels sx have improved. Her dysuria has resolved though feels like she still has slight frequency. Denies, fevers, chills, back/flank pain. Ucx from ED came back as Klebsiella pna >100k CFU, pan-sensitive.  Gonorrhea/chlamydia also checked at visit and was negative. Has been drinking a lot of water as instructed by ED.

## 2021-09-10 NOTE — ASSESSMENT
[FreeTextEntry1] : 46F hx pyelo (hospitalization Aug 2021) presents for post ED-visit for UT\par \par #Klebsiella UTI \par -Dx on 9/7 in ED\par -Discharged on Cefpodoxime 100mg every 12 hours for 7 days s/p 2d of tx, with sx improvement\par -Sensitivities: sensitive to CTX, suggest it will be sensitive to cefpodoxime\par -Cw cefpodixime until completion, will do \par -reinforced post-coital voiding to avoid UTI, c/w good hydration\par -If patient continues to have more UTIs would consider prophylactic antibiotic to take pre-coitus.\par \par #Hx pyelo\par recently hospitalized in Aug 2021 with E.coli R Pyelo w/ GNR bacteremia, seen by urology at that time for findings concerning on CT A/P. However, CT urogram showed no stricture.\par \par \par

## 2021-09-10 NOTE — REVIEW OF SYSTEMS
[Frequency] : frequency [Negative] : Gastrointestinal [Dysuria] : no dysuria [Hematuria] : no hematuria

## 2021-09-15 DIAGNOSIS — N39.0 URINARY TRACT INFECTION, SITE NOT SPECIFIED: ICD-10-CM

## 2021-10-01 ENCOUNTER — EMERGENCY (EMERGENCY)
Facility: HOSPITAL | Age: 46
LOS: 1 days | Discharge: HOME | End: 2021-10-01
Attending: EMERGENCY MEDICINE
Payer: MEDICAID

## 2021-10-01 VITALS
OXYGEN SATURATION: 99 % | RESPIRATION RATE: 16 BRPM | SYSTOLIC BLOOD PRESSURE: 104 MMHG | TEMPERATURE: 98 F | HEART RATE: 66 BPM | DIASTOLIC BLOOD PRESSURE: 71 MMHG

## 2021-10-01 VITALS
HEART RATE: 82 BPM | RESPIRATION RATE: 17 BRPM | OXYGEN SATURATION: 96 % | DIASTOLIC BLOOD PRESSURE: 73 MMHG | WEIGHT: 119.93 LBS | HEIGHT: 60 IN | SYSTOLIC BLOOD PRESSURE: 110 MMHG | TEMPERATURE: 98 F

## 2021-10-01 LAB
APPEARANCE UR: CLEAR — SIGNIFICANT CHANGE UP
BILIRUB UR-MCNC: NEGATIVE — SIGNIFICANT CHANGE UP
COLOR SPEC: ABNORMAL
DIFF PNL FLD: NEGATIVE — SIGNIFICANT CHANGE UP
GLUCOSE UR QL: NEGATIVE — SIGNIFICANT CHANGE UP
KETONES UR-MCNC: NEGATIVE — SIGNIFICANT CHANGE UP
LEUKOCYTE ESTERASE UR-ACNC: NEGATIVE — SIGNIFICANT CHANGE UP
NITRITE UR-MCNC: NEGATIVE — SIGNIFICANT CHANGE UP
PH UR: 7 — SIGNIFICANT CHANGE UP (ref 5–8)
PROT UR-MCNC: NEGATIVE — SIGNIFICANT CHANGE UP
SP GR SPEC: 1.02 — SIGNIFICANT CHANGE UP (ref 1.01–1.02)
UROBILINOGEN FLD QL: NEGATIVE — SIGNIFICANT CHANGE UP

## 2021-10-01 PROCEDURE — 87086 URINE CULTURE/COLONY COUNT: CPT

## 2021-10-01 PROCEDURE — 99283 EMERGENCY DEPT VISIT LOW MDM: CPT

## 2021-10-01 RX ORDER — CIPROFLOXACIN LACTATE 400MG/40ML
500 VIAL (ML) INTRAVENOUS ONCE
Refills: 0 | Status: COMPLETED | OUTPATIENT
Start: 2021-10-01 | End: 2021-10-01

## 2021-10-01 RX ORDER — MOXIFLOXACIN HYDROCHLORIDE TABLETS, 400 MG 400 MG/1
1 TABLET, FILM COATED ORAL
Qty: 14 | Refills: 0
Start: 2021-10-01 | End: 2021-10-07

## 2021-10-01 RX ADMIN — Medication 500 MILLIGRAM(S): at 23:34

## 2021-10-01 NOTE — ED PROVIDER NOTE - PATIENT PORTAL LINK FT
You can access the FollowMyHealth Patient Portal offered by [facility] by registering at the following website: [email or web address]. By joining [email or web address]’s FollowMyHealth portal, you will also be able to view your health information using other applications (apps) compatible with our system.

## 2021-10-01 NOTE — ED PROVIDER NOTE - OBJECTIVE STATEMENT
pt is a 47 y/o female with uti sts for the past week tried pyridium starting yesterday for dysuria, no fever/n/v/abd pain., pt with mild l cva tend, no fever at home, pt was admitted for urosepsis in august for proteus in the urine. pt is a 47 y/o female with uti sts for the past week tried pyridium starting yesterday for dysuria, no fever/n/v/abd pain., pt with mild l cva tend, no fever at home, pt was admitted for urosepsis in august for klebsiella in the urine.

## 2021-10-01 NOTE — ED PROVIDER NOTE - NSFOLLOWUPINSTRUCTIONS_ED_ALL_ED_FT
take cipro twice a day for 7 days for uti sts  follow up with urine culture result in 2 days  return for fevers, worsening pain to abdominal or back region or vomiting.  follow up with your doctor in 2 days

## 2021-10-01 NOTE — ED PROVIDER NOTE - CLINICAL SUMMARY MEDICAL DECISION MAKING FREE TEXT BOX
47 y/o female with recurrent uti sts with no f/c/cva tend/vomiting to check ua,  pt with prior urosepsis but doesn't meet SS criteria, vss. check ua, preg status, no abd pain.

## 2021-10-01 NOTE — ED ADULT NURSE NOTE - OBJECTIVE STATEMENT
Report received from break coverage RN. 46y F PMHx urosepsis 2/2 pyelonephritis presents to ED from home c/o dysuria x1 week. Pt started Pyridium yesterday with no relief of Sx. Today noticed new onset Lt LBP. Was admitted for urosepsis in August & reports these Sx feel similar. Denies CP, SOB, H/A, N/V/D, abdominal pain, f/c, & dizziness. A&Ox4. Lungs CTA & no respiratory distress noted on RA. Abdomen soft, nondistended, & nontender to palpation. Skin warm, dry, & intact. +Mild Lt CVA tenderness. MAEx4, able to ambulate independently. No LE edema noted on exam.

## 2021-10-01 NOTE — ED PROVIDER NOTE - PROGRESS NOTE DETAILS
ua nl but with sts and in light of history to start cipro, pending urine culture results. prior culture sensitve to cipro. well appeairng, vss.

## 2021-10-02 LAB
CULTURE RESULTS: SIGNIFICANT CHANGE UP
SPECIMEN SOURCE: SIGNIFICANT CHANGE UP

## 2021-10-02 RX ORDER — MOXIFLOXACIN HYDROCHLORIDE TABLETS, 400 MG 400 MG/1
1 TABLET, FILM COATED ORAL
Qty: 14 | Refills: 0
Start: 2021-10-02 | End: 2021-10-08

## 2021-10-02 RX ORDER — CIPROFLOXACIN LACTATE 400MG/40ML
1 VIAL (ML) INTRAVENOUS
Qty: 14 | Refills: 0
Start: 2021-10-02 | End: 2021-10-08

## 2021-10-04 ENCOUNTER — NON-APPOINTMENT (OUTPATIENT)
Age: 46
End: 2021-10-04

## 2021-10-21 ENCOUNTER — NON-APPOINTMENT (OUTPATIENT)
Age: 46
End: 2021-10-21

## 2021-10-22 ENCOUNTER — APPOINTMENT (OUTPATIENT)
Dept: INTERNAL MEDICINE | Facility: CLINIC | Age: 46
End: 2021-10-22

## 2021-11-05 ENCOUNTER — NON-APPOINTMENT (OUTPATIENT)
Age: 46
End: 2021-11-05

## 2021-11-08 ENCOUNTER — APPOINTMENT (OUTPATIENT)
Dept: INTERNAL MEDICINE | Facility: CLINIC | Age: 46
End: 2021-11-08

## 2021-12-04 ENCOUNTER — EMERGENCY (EMERGENCY)
Facility: HOSPITAL | Age: 46
LOS: 1 days | Discharge: HOME | End: 2021-12-04
Attending: STUDENT IN AN ORGANIZED HEALTH CARE EDUCATION/TRAINING PROGRAM
Payer: MEDICAID

## 2021-12-04 VITALS
RESPIRATION RATE: 16 BRPM | HEART RATE: 67 BPM | SYSTOLIC BLOOD PRESSURE: 119 MMHG | OXYGEN SATURATION: 99 % | TEMPERATURE: 98 F | DIASTOLIC BLOOD PRESSURE: 76 MMHG

## 2021-12-04 VITALS
HEART RATE: 79 BPM | SYSTOLIC BLOOD PRESSURE: 110 MMHG | OXYGEN SATURATION: 100 % | HEIGHT: 60 IN | DIASTOLIC BLOOD PRESSURE: 73 MMHG | RESPIRATION RATE: 16 BRPM | WEIGHT: 119.93 LBS | TEMPERATURE: 98 F

## 2021-12-04 LAB
APPEARANCE UR: CLEAR — SIGNIFICANT CHANGE UP
BILIRUB UR-MCNC: NEGATIVE — SIGNIFICANT CHANGE UP
COLOR SPEC: SIGNIFICANT CHANGE UP
DIFF PNL FLD: NEGATIVE — SIGNIFICANT CHANGE UP
GLUCOSE UR QL: NEGATIVE — SIGNIFICANT CHANGE UP
KETONES UR-MCNC: NEGATIVE — SIGNIFICANT CHANGE UP
LEUKOCYTE ESTERASE UR-ACNC: NEGATIVE — SIGNIFICANT CHANGE UP
NITRITE UR-MCNC: NEGATIVE — SIGNIFICANT CHANGE UP
PH UR: 8.5 — HIGH (ref 5–8)
PROT UR-MCNC: SIGNIFICANT CHANGE UP
SP GR SPEC: 1.01 — SIGNIFICANT CHANGE UP (ref 1.01–1.02)
UROBILINOGEN FLD QL: NEGATIVE — SIGNIFICANT CHANGE UP

## 2021-12-04 PROCEDURE — 99283 EMERGENCY DEPT VISIT LOW MDM: CPT

## 2021-12-04 PROCEDURE — 87086 URINE CULTURE/COLONY COUNT: CPT

## 2021-12-04 PROCEDURE — 81003 URINALYSIS AUTO W/O SCOPE: CPT

## 2021-12-04 NOTE — ED ADULT NURSE NOTE - OBJECTIVE STATEMENT
46yF presents to the ED from home with reports of L breast pain x1week. PMHx of UTI with bacteremia. x1week of intermittent sharp pain to L lateral breast, nonradiating, worse with palpation. No relief with tylenol at home. No redness, swelling, discharge, fevers. LMP ~1month ago. Last mammo x1year ago. Denies CP, SOB, N/V, abdominal pain, back pain, numbness/tingling, LE edema. Bed locked in lowest position with appropriate side rails raised. Pt given call bell and explained call bell system. Pt aware of plan to await ED MD evaluation. Pt has no other questions or concerns at this time.

## 2021-12-04 NOTE — ED PROVIDER NOTE - PHYSICAL EXAMINATION
CONSTITUTIONAL: NAD  SKIN: Warm dry  HEAD: NCAT  EYES: NL inspection  ENT: MMM  NECK: Supple; non tender.  CARD: RRR  BREAST: no axillary lymphadenopathy, R and L breast symmetric w/o skin changes, no nipple discharge, no clear lesions/masses or fluctuance. Tender to palpation in 2'oclock to 5'oclock lateral side of breast  RESP: CTAB  ABD: S/NT no R/G, no CVA tenderness, no suprapubic tenderness  EXT: no pedal edema  NEURO: Grossly unremarkable  PSYCH: Cooperative, appropriate. CONSTITUTIONAL: NAD  SKIN: Warm dry  HEAD: NCAT  EYES: NL inspection  ENT: MMM  NECK: Supple; non tender.  CARD: RRR  BREAST: no axillary lymphadenopathy, R and L breast symmetric w/o skin changes, no nipple discharge, no clear lesions/masses or fluctuance. Tender to palpation in 2'oclock to 5'oclock lateral side of breast  Attending MD Miguel: Agree with above. Pt with dense breast tissue bilaterally on L breast with TTP without overlying skin changes, induration, or fluctuance.   RESP: CTAB  ABD: S/NT no R/G, no CVA tenderness, no suprapubic tenderness  EXT: no pedal edema  NEURO: Grossly unremarkable  PSYCH: Cooperative, appropriate.

## 2021-12-04 NOTE — ED PROVIDER NOTE - NSFOLLOWUPINSTRUCTIONS_ED_ALL_ED_FT
Breast Tenderness      Breast tenderness is a common problem for women of all ages, but may also occur in men. Breast tenderness may range from mild discomfort to severe pain. In women, the pain usually comes and goes with the menstrual cycle, but it can also be constant.    Breast tenderness has many possible causes, including hormone changes, infections, and taking certain medicines. You may have tests, such as a mammogram or an ultrasound, to check for any unusual findings. Having breast tenderness usually does not mean that you have breast cancer.    - we recommend tylenol and motrin every 6-8 hours for pain, wear a supportive bra  - Urine point of care test was negative for pregnancy.  - follow up with ob gyn for a repeat mammogram as indicated  - monitor for your menstrual period and correlation with your symptoms      Follow these instructions at home:    Managing pain and discomfort    •If directed, put ice to the painful area. To do this:  •Put ice in a plastic bag.  •Place a towel between your skin and the bag.  •Leave the ice on for 20 minutes, 2–3 times a day.  •Wear a supportive bra, especially during exercise. You may also want to wear a supportive bra while sleeping if your breasts are very tender.    Medicines     •Take over-the-counter and prescription medicines only as told by your health care provider. There is no sign of infection.     Eating and drinking   •Your health care provider may recommend that you lessen the amount of fat in your diet. You can do this by:  •Limiting fried foods.  •Cooking foods using methods such as baking, boiling, grilling, and broiling.  •Decrease the amount of caffeine in your diet. Instead, drink more water and choose caffeine-free drinks.    General instructions   •Keep a log of the days and times when your breasts are most tender.  •Ask your health care provider how to do breast exams at home. This will help you notice if you have an unusual growth or lump.  •Keep all follow-up visits as told by your health care provider. This is important.    Contact a health care provider if:  •Any part of your breast is hard, red, and hot to the touch. This may be a sign of infection.    •You are a woman and:  •Not breastfeeding and you have fluid, especially blood or pus, coming out of your nipples.  •Have a new or painful lump in your breast that remains after your menstrual period ends.  •You have a fever.  •Your pain does not improve or it gets worse.  •Your pain is interfering with your daily activities.    Summary  •Breast tenderness may range from mild discomfort to severe pain.  •Breast tenderness has many possible causes, including hormone changes, infections, and taking certain medicines.  •It can be treated with ice, wearing a supportive bra, and medicines.  •Make changes to your diet if told to by your health care provider.    This information is not intended to replace advice given to you by your health care provider. Make sure you discuss any questions you have with your health care provider.

## 2021-12-04 NOTE — ED PROVIDER NOTE - CLINICAL SUMMARY MEDICAL DECISION MAKING FREE TEXT BOX
carlos pgy1; 47yo F no significant PMH presents ot ED with L lateral breast pain w/o masses/lesions/discharge/skin changes. Mammo neg 1 yr ago. Explained do not do breast mammograms/comprehensive US here. Will check ua for UTI and upreg and dc with obgyn f/u. carlos pgy1; 45yo F no significant PMH presents ot ED with L lateral breast pain w/o masses/lesions/discharge/skin changes. Mammo neg 1 yr ago. Explained do not do breast mammograms/comprehensive US here. Will check ua for UTI and upreg and dc with obgyn f/u.    Attending MD Rivera : Pt here with L breast pain not compatible with abscess, mastitis or cellulitis. No obvious signs of malignancy. Patient requesting mammogram - mammogram not performed in ED. Patient referred to outpatient OBGYN/ PCP whom performe her mammogram last year that was purportedly normal.

## 2021-12-04 NOTE — ED PROVIDER NOTE - OBJECTIVE STATEMENT
45yo F with no PMH but has had UTI with bacteremia in past presents to ED for L breast pain. 1 wk of sharp on-off L breast pain over lateral edge 8/10, no clear inciting event or factors that improve/worsen. No skin changes, no nipple discharge. Still menstruating but doesn't think she is pregnant, is sexually active. LMP ~1mo ago. Last Mammo 1 year ago which was neg, though she thinks she may have had a cyst prior. No central CP, SOB, diaphroesis, cardiac hx, abd pain, NVDC, LE edema, axillary swelling. Does endorse some urinary frequency and darkening.

## 2021-12-04 NOTE — ED PROVIDER NOTE - NS ED ROS FT
Constitutional:  See HPI  ENMT: No neck pain or stiffness  Cardiac:  No chest pain  L breast pain as above  Respiratory:  No cough or respiratory distress.   GI:  No nausea, vomiting, diarrhea or abdominal pain.  :  +frequency and darkening  MS:  some mild lower back pain.  Neuro:  No headache   Skin:  No skin rash  Except as documented in the HPI,  all other systems are negative

## 2021-12-04 NOTE — ED ADULT NURSE NOTE - CAS ELECT INFOMATION PROVIDED
Pt verbalizes understanding of discharge instructions. All questions answered. Safe to be discharged./DC instructions

## 2021-12-04 NOTE — ED PROVIDER NOTE - PATIENT PORTAL LINK FT
You can access the FollowMyHealth Patient Portal offered by [facility] by registering at the following website: [email or web address]. By joining [employer]’s FollowMyHealth portal, you will also be able to view your health information using other applications (apps) compatible with our system.

## 2021-12-05 LAB
CULTURE RESULTS: SIGNIFICANT CHANGE UP
SPECIMEN SOURCE: SIGNIFICANT CHANGE UP

## 2021-12-28 ENCOUNTER — NON-APPOINTMENT (OUTPATIENT)
Age: 46
End: 2021-12-28

## 2021-12-29 ENCOUNTER — APPOINTMENT (OUTPATIENT)
Dept: INTERNAL MEDICINE | Facility: CLINIC | Age: 46
End: 2021-12-29

## 2022-01-30 ENCOUNTER — EMERGENCY (EMERGENCY)
Facility: HOSPITAL | Age: 47
LOS: 1 days | Discharge: HOME | End: 2022-01-30
Attending: EMERGENCY MEDICINE
Payer: MEDICAID

## 2022-01-30 VITALS
DIASTOLIC BLOOD PRESSURE: 69 MMHG | OXYGEN SATURATION: 97 % | HEART RATE: 97 BPM | TEMPERATURE: 98 F | SYSTOLIC BLOOD PRESSURE: 121 MMHG | RESPIRATION RATE: 18 BRPM

## 2022-01-30 VITALS
OXYGEN SATURATION: 96 % | TEMPERATURE: 98 F | WEIGHT: 119.93 LBS | HEART RATE: 102 BPM | HEIGHT: 60 IN | DIASTOLIC BLOOD PRESSURE: 74 MMHG | SYSTOLIC BLOOD PRESSURE: 116 MMHG | RESPIRATION RATE: 19 BRPM

## 2022-01-30 LAB
ALBUMIN SERPL ELPH-MCNC: 4.3 G/DL — SIGNIFICANT CHANGE UP (ref 3.3–5)
ALP SERPL-CCNC: 68 U/L — SIGNIFICANT CHANGE UP (ref 40–120)
ALT FLD-CCNC: 7 U/L — LOW (ref 10–45)
ANION GAP SERPL CALC-SCNC: 12 MMOL/L — SIGNIFICANT CHANGE UP (ref 5–17)
APPEARANCE UR: CLEAR — SIGNIFICANT CHANGE UP
AST SERPL-CCNC: 16 U/L — SIGNIFICANT CHANGE UP (ref 10–40)
BACTERIA # UR AUTO: ABNORMAL
BASOPHILS # BLD AUTO: 0.04 K/UL — SIGNIFICANT CHANGE UP (ref 0–0.2)
BASOPHILS NFR BLD AUTO: 0.5 % — SIGNIFICANT CHANGE UP (ref 0–2)
BILIRUB SERPL-MCNC: 0.6 MG/DL — SIGNIFICANT CHANGE UP (ref 0.2–1.2)
BILIRUB UR-MCNC: ABNORMAL
BUN SERPL-MCNC: 15 MG/DL — SIGNIFICANT CHANGE UP (ref 7–23)
CALCIUM SERPL-MCNC: 9 MG/DL — SIGNIFICANT CHANGE UP (ref 8.4–10.5)
CHLORIDE SERPL-SCNC: 103 MMOL/L — SIGNIFICANT CHANGE UP (ref 96–108)
CO2 SERPL-SCNC: 21 MMOL/L — LOW (ref 22–31)
COLOR SPEC: ABNORMAL
CREAT SERPL-MCNC: 0.47 MG/DL — LOW (ref 0.5–1.3)
DIFF PNL FLD: NEGATIVE — SIGNIFICANT CHANGE UP
EOSINOPHIL # BLD AUTO: 0.34 K/UL — SIGNIFICANT CHANGE UP (ref 0–0.5)
EOSINOPHIL NFR BLD AUTO: 4.7 % — SIGNIFICANT CHANGE UP (ref 0–6)
EPI CELLS # UR: 4 /HPF — SIGNIFICANT CHANGE UP
GLUCOSE SERPL-MCNC: 96 MG/DL — SIGNIFICANT CHANGE UP (ref 70–99)
GLUCOSE UR QL: NEGATIVE — SIGNIFICANT CHANGE UP
HCT VFR BLD CALC: 38.4 % — SIGNIFICANT CHANGE UP (ref 34.5–45)
HGB BLD-MCNC: 12 G/DL — SIGNIFICANT CHANGE UP (ref 11.5–15.5)
HYALINE CASTS # UR AUTO: 0 /LPF — SIGNIFICANT CHANGE UP (ref 0–2)
IMM GRANULOCYTES NFR BLD AUTO: 0.3 % — SIGNIFICANT CHANGE UP (ref 0–1.5)
KETONES UR-MCNC: NEGATIVE — SIGNIFICANT CHANGE UP
LEUKOCYTE ESTERASE UR-ACNC: ABNORMAL
LYMPHOCYTES # BLD AUTO: 1.92 K/UL — SIGNIFICANT CHANGE UP (ref 1–3.3)
LYMPHOCYTES # BLD AUTO: 26.3 % — SIGNIFICANT CHANGE UP (ref 13–44)
MCHC RBC-ENTMCNC: 27.8 PG — SIGNIFICANT CHANGE UP (ref 27–34)
MCHC RBC-ENTMCNC: 31.3 GM/DL — LOW (ref 32–36)
MCV RBC AUTO: 89.1 FL — SIGNIFICANT CHANGE UP (ref 80–100)
MONOCYTES # BLD AUTO: 0.46 K/UL — SIGNIFICANT CHANGE UP (ref 0–0.9)
MONOCYTES NFR BLD AUTO: 6.3 % — SIGNIFICANT CHANGE UP (ref 2–14)
NEUTROPHILS # BLD AUTO: 4.53 K/UL — SIGNIFICANT CHANGE UP (ref 1.8–7.4)
NEUTROPHILS NFR BLD AUTO: 61.9 % — SIGNIFICANT CHANGE UP (ref 43–77)
NITRITE UR-MCNC: POSITIVE
NRBC # BLD: 0 /100 WBCS — SIGNIFICANT CHANGE UP (ref 0–0)
PH UR: 6 — SIGNIFICANT CHANGE UP (ref 5–8)
PLATELET # BLD AUTO: 263 K/UL — SIGNIFICANT CHANGE UP (ref 150–400)
POTASSIUM SERPL-MCNC: 4.1 MMOL/L — SIGNIFICANT CHANGE UP (ref 3.5–5.3)
POTASSIUM SERPL-SCNC: 4.1 MMOL/L — SIGNIFICANT CHANGE UP (ref 3.5–5.3)
PROT SERPL-MCNC: 7.1 G/DL — SIGNIFICANT CHANGE UP (ref 6–8.3)
PROT UR-MCNC: ABNORMAL
RBC # BLD: 4.31 M/UL — SIGNIFICANT CHANGE UP (ref 3.8–5.2)
RBC # FLD: 15.1 % — HIGH (ref 10.3–14.5)
RBC CASTS # UR COMP ASSIST: 5 /HPF — HIGH (ref 0–4)
SODIUM SERPL-SCNC: 136 MMOL/L — SIGNIFICANT CHANGE UP (ref 135–145)
SP GR SPEC: 1.03 — HIGH (ref 1.01–1.02)
UROBILINOGEN FLD QL: ABNORMAL
WBC # BLD: 7.31 K/UL — SIGNIFICANT CHANGE UP (ref 3.8–10.5)
WBC # FLD AUTO: 7.31 K/UL — SIGNIFICANT CHANGE UP (ref 3.8–10.5)
WBC UR QL: 8 /HPF — HIGH (ref 0–5)

## 2022-01-30 PROCEDURE — 96374 THER/PROPH/DIAG INJ IV PUSH: CPT

## 2022-01-30 PROCEDURE — 85025 COMPLETE CBC W/AUTO DIFF WBC: CPT

## 2022-01-30 PROCEDURE — 99285 EMERGENCY DEPT VISIT HI MDM: CPT

## 2022-01-30 PROCEDURE — 76830 TRANSVAGINAL US NON-OB: CPT

## 2022-01-30 PROCEDURE — 76830 TRANSVAGINAL US NON-OB: CPT | Mod: 26

## 2022-01-30 PROCEDURE — 80053 COMPREHEN METABOLIC PANEL: CPT

## 2022-01-30 PROCEDURE — 87186 SC STD MICRODIL/AGAR DIL: CPT

## 2022-01-30 PROCEDURE — 87491 CHLMYD TRACH DNA AMP PROBE: CPT

## 2022-01-30 PROCEDURE — 99284 EMERGENCY DEPT VISIT MOD MDM: CPT | Mod: 25

## 2022-01-30 PROCEDURE — 87591 N.GONORRHOEAE DNA AMP PROB: CPT

## 2022-01-30 PROCEDURE — 87086 URINE CULTURE/COLONY COUNT: CPT

## 2022-01-30 PROCEDURE — 81001 URINALYSIS AUTO W/SCOPE: CPT

## 2022-01-30 RX ORDER — SODIUM CHLORIDE 9 MG/ML
1000 INJECTION, SOLUTION INTRAVENOUS ONCE
Refills: 0 | Status: COMPLETED | OUTPATIENT
Start: 2022-01-30 | End: 2022-01-30

## 2022-01-30 RX ORDER — CEFTRIAXONE 500 MG/1
1000 INJECTION, POWDER, FOR SOLUTION INTRAMUSCULAR; INTRAVENOUS ONCE
Refills: 0 | Status: COMPLETED | OUTPATIENT
Start: 2022-01-30 | End: 2022-01-30

## 2022-01-30 RX ORDER — CEPHALEXIN 500 MG
1 CAPSULE ORAL
Qty: 21 | Refills: 0
Start: 2022-01-30 | End: 2022-02-05

## 2022-01-30 RX ADMIN — SODIUM CHLORIDE 1000 MILLILITER(S): 9 INJECTION, SOLUTION INTRAVENOUS at 09:49

## 2022-01-30 RX ADMIN — CEFTRIAXONE 100 MILLIGRAM(S): 500 INJECTION, POWDER, FOR SOLUTION INTRAMUSCULAR; INTRAVENOUS at 09:48

## 2022-01-30 RX ADMIN — Medication 100 MILLIGRAM(S): at 12:59

## 2022-01-30 NOTE — ED PROVIDER NOTE - OBJECTIVE STATEMENT
46 year old female with a pmhx of UTI, complicated by bacteremia in August 2021, presents to ED for evaluation of dysuria x6 days. Pt reports symptoms feel similar to previous UTI. Also has mild back discomfort. States she was taking Pyridium over the counter, which initially helped, but now symptoms are returning. On ROS, pt notes vaginal discharge and pelvic discomfort x10 days. Notes a new sexual partner and would like to be tested for STIs. Pt reports no fever, chills, vomiting, diarrhea, abd pain, vaginal bleeding. No hx of surgeries, NKDA. No hx of sexually transmitted infections. LMP - 12/25/22

## 2022-01-30 NOTE — ED ADULT NURSE NOTE - NS ED NURSE DISCH DISPOSITION
Pre-Visit Chart Review  For Appointment Scheduled on 5/30/17    Health Maintenance Due   Topic Date Due    TETANUS VACCINE  06/02/1965    Colonoscopy  06/02/1997    Zoster Vaccine  06/02/2007                     
Discharged

## 2022-01-30 NOTE — ED ADULT TRIAGE NOTE - CHIEF COMPLAINT QUOTE
urinary symptoms x2 weeks, worse this week hx of UTI, was on medication unsure if it was antibiotics

## 2022-01-30 NOTE — ED PROVIDER NOTE - NS ED ROS FT
General: no fever, no chills  Eyes: no vision changes, no eye pain  Cardiovascular: no chest pain, no edema  Respiratory: no cough, no shortness of breath  Gastrointestinal: no abdominal pain, no nausea, no vomiting, no diarrhea  Genitourinary: +dysuria, no hematuria, +vaginal discharge   Musculoskeletal: +back pain, no joint pain  Skin: no rash, no lesions  Neuro: no numbness, no tingling  Psych: no depression, no anxiety

## 2022-01-30 NOTE — ED PROVIDER NOTE - PHYSICAL EXAMINATION
General: well appearing, no acute distress, AOx3  Skin: no rash, no pallor  Head: normocephalic, atraumatic  Eyes: clear conjunctiva, EOMI  ENMT: airway patent, no nasal discharge  Cardiovascular: normal rate, normal rhythm, S1/S2  Pulmonary: clear to auscultation bilaterally, no rales, rhonchi, or wheeze  Abdomen: soft, nontender, mild b/l cva tenderness  : copious white discharge from cervical os, rt>lt adnexal tenderness, no masses palpated, no external discharge noted   Musculoskeletal: moving extremities well, no deformity  Psych: normal mood, normal affect

## 2022-01-30 NOTE — ED PROVIDER NOTE - NSFOLLOWUPINSTRUCTIONS_ED_ALL_ED_FT
1) Follow up with your OB/GYN within 1 week of being seen in the Emergency Department  2) Return to the ED immediately for new or worsening symptoms fever, difficulty urinating, not improving, vomiting, unable to eat/drink   3) Please continue to take any home medications as prescribed  4) Your test results from your ED visit were discussed with you prior to discharge  5) You were provided with a copy of your test results  6) You were treated empirically for Gonorrhea and Chlamydia. You received Ceftriaxone in the Emergency Department. You will be treated with Doxycyline and Keflex for possible sexually transmitted infection and UTI. Your Gonorrhea/Chlamydia swab is pending. If you don't improve you may have a different infection that was not tested for. Please discuss with your OB/GYN

## 2022-01-30 NOTE — ED PROVIDER NOTE - NSFOLLOWUPCLINICS_GEN_ALL_ED_FT
[facility]  [facility]necology Oncology  [address] [place] [address]  Phone: [phone]  Fax: [phone]  Follow Up Time: 4-6 Days

## 2022-01-30 NOTE — ED PROVIDER NOTE - CLINICAL SUMMARY MEDICAL DECISION MAKING FREE TEXT BOX
Al Starkey, PGY-2- 46 year old female with a pmhx of UTI c/b bacteremia, here for evaluation of dysuria x6 days, also with vaginal discharge x10 days. Pt with stable vitals. Overall well-appearing. Pelvic exam with copious white discharge. Plan to obtain cbc, cmp, ua, ucx, g/c swab, hiv test, us transvaginal. If work up unremarkable, plan to empirically treat for G/C while swab is pending. Will give Ceftriaxone 1 g while pt is waiting in ED to cover for pyelo and Gonorrhea. Will need OB/GYN follow up

## 2022-01-30 NOTE — ED PROVIDER NOTE - ATTENDING CONTRIBUTION TO CARE
Al Starkey, PGY-2- 46 year old F with hx of UTI with UTI-like symptoms also with new sexual partner without hx of STI with new discharge. Will test for STI but treat empirically. Given vaginal exam, plan for US to ensure no TOA, though less clinically likely in the absence of fever. Will also check pregnancy. No clinical evidence of pyelonephritis at this time or urosepsis at this time.

## 2022-01-30 NOTE — ED PROVIDER NOTE - PATIENT PORTAL LINK FT
You can access the FollowMyHealth Patient Portal offered by [facility] by registering at the following website: [email or web address]. By joining [employer]’s FollowMyHealth portal, you will also be able to view your health information using other applications (apps) compatible with our system.

## 2022-01-30 NOTE — ED ADULT NURSE NOTE - TEMPLATE LIST FOR HEAD TO TOE ASSESSMENT
Conscious Sedation Pre-Proced


Time Reviewed:  10:47


ASA Class:  2











Airway Mallampati Classification: ([place] appropriate class) I.  II.  III,  IV


 


Lungs 


 


Heart 


 


 ASA score


 


 ASA 1: a normal healthy patient


 


 ASA 2:  a patient with a mild systemic disease (mid diabetes, controlled 

hypertension, obesity 


 


 ASA 3:  a patient with a severe systemic disease that limits activity  (angina

, COPD, prior Myocardial infarction)


 


 ASA 4:  a patient with an incapacitating disease that is a constant threat to 

life (CHF, renal failure)


 


 ASA 5:  a moribund patient not expected to survive 24 hrs.  (ruptured aneurysm)


 


 ASA 6:  a declared brain dead patient whose organs are being harvested.


 


 For emergent operations, add the letter E after the classification








Grade 2


Sedation Plan:  Discussed options with patient/fam


Note


The patient is an appropriate candidate to undergo the planned procedure, 

sedation, and anesthesia.





The patient immediately re-assessed prior to indication.











KALI RUIZ MD Jun 26, 2017 11:53 am
 Symptoms

## 2022-01-30 NOTE — ED PROVIDER NOTE - PROGRESS NOTE DETAILS
Al Starkey, PGY-2- Reviewed results of work up with patient. She is well-appearing and vitals are stable. Will treat with Keflex for UTI and Doxycycline for G/C. Patient received Ceftriaxone 1 g to cover the UTI/Gonorrhea. Advised to f/u with OB/GYN. Possible BV infection, but advised to f/u with OB/GYN and discuss treatment if symptoms don't improve. Discussed results of work up with patient. Patient agrees with plan to discharge home. All questions answered regarding plan. Strict return precautions given.

## 2022-02-01 NOTE — ED POST DISCHARGE NOTE - RESULT SUMMARY
UCx prelim + > 100K E.Coli; d/c on keflex and doxy. F/u final results/sensitivities. - Janiya Griffin PA-C

## 2022-02-26 ENCOUNTER — EMERGENCY (EMERGENCY)
Facility: HOSPITAL | Age: 47
LOS: 1 days | Discharge: HOME | End: 2022-02-26
Attending: EMERGENCY MEDICINE
Payer: MEDICAID

## 2022-02-26 VITALS
HEART RATE: 90 BPM | WEIGHT: 119.93 LBS | SYSTOLIC BLOOD PRESSURE: 107 MMHG | RESPIRATION RATE: 19 BRPM | OXYGEN SATURATION: 97 % | TEMPERATURE: 98 F | HEIGHT: 60 IN | DIASTOLIC BLOOD PRESSURE: 71 MMHG

## 2022-02-26 VITALS
RESPIRATION RATE: 18 BRPM | TEMPERATURE: 98 F | SYSTOLIC BLOOD PRESSURE: 112 MMHG | OXYGEN SATURATION: 99 % | HEART RATE: 88 BPM | DIASTOLIC BLOOD PRESSURE: 76 MMHG

## 2022-02-26 LAB
ALBUMIN SERPL ELPH-MCNC: 4.3 G/DL — SIGNIFICANT CHANGE UP (ref 3.3–5)
ALP SERPL-CCNC: 66 U/L — SIGNIFICANT CHANGE UP (ref 40–120)
ALT FLD-CCNC: 5 U/L — LOW (ref 10–45)
ANION GAP SERPL CALC-SCNC: 12 MMOL/L — SIGNIFICANT CHANGE UP (ref 5–17)
APPEARANCE UR: CLEAR — SIGNIFICANT CHANGE UP
AST SERPL-CCNC: 17 U/L — SIGNIFICANT CHANGE UP (ref 10–40)
BACTERIA # UR AUTO: NEGATIVE — SIGNIFICANT CHANGE UP
BASOPHILS # BLD AUTO: 0.03 K/UL — SIGNIFICANT CHANGE UP (ref 0–0.2)
BASOPHILS NFR BLD AUTO: 0.4 % — SIGNIFICANT CHANGE UP (ref 0–2)
BILIRUB SERPL-MCNC: 0.6 MG/DL — SIGNIFICANT CHANGE UP (ref 0.2–1.2)
BILIRUB UR-MCNC: NEGATIVE — SIGNIFICANT CHANGE UP
BUN SERPL-MCNC: 14 MG/DL — SIGNIFICANT CHANGE UP (ref 7–23)
CALCIUM SERPL-MCNC: 9.1 MG/DL — SIGNIFICANT CHANGE UP (ref 8.4–10.5)
CHLORIDE SERPL-SCNC: 104 MMOL/L — SIGNIFICANT CHANGE UP (ref 96–108)
CO2 SERPL-SCNC: 21 MMOL/L — LOW (ref 22–31)
COLOR SPEC: ABNORMAL
CREAT SERPL-MCNC: 0.46 MG/DL — LOW (ref 0.5–1.3)
DIFF PNL FLD: NEGATIVE — SIGNIFICANT CHANGE UP
EOSINOPHIL # BLD AUTO: 0.38 K/UL — SIGNIFICANT CHANGE UP (ref 0–0.5)
EOSINOPHIL NFR BLD AUTO: 5.6 % — SIGNIFICANT CHANGE UP (ref 0–6)
EPI CELLS # UR: 1 /HPF — SIGNIFICANT CHANGE UP
GLUCOSE SERPL-MCNC: 96 MG/DL — SIGNIFICANT CHANGE UP (ref 70–99)
GLUCOSE UR QL: NEGATIVE — SIGNIFICANT CHANGE UP
HCT VFR BLD CALC: 37.6 % — SIGNIFICANT CHANGE UP (ref 34.5–45)
HGB BLD-MCNC: 11.7 G/DL — SIGNIFICANT CHANGE UP (ref 11.5–15.5)
HYALINE CASTS # UR AUTO: 1 /LPF — SIGNIFICANT CHANGE UP (ref 0–2)
KETONES UR-MCNC: NEGATIVE — SIGNIFICANT CHANGE UP
LEUKOCYTE ESTERASE UR-ACNC: NEGATIVE — SIGNIFICANT CHANGE UP
LYMPHOCYTES # BLD AUTO: 1.84 K/UL — SIGNIFICANT CHANGE UP (ref 1–3.3)
LYMPHOCYTES # BLD AUTO: 26.9 % — SIGNIFICANT CHANGE UP (ref 13–44)
MCHC RBC-ENTMCNC: 28.1 PG — SIGNIFICANT CHANGE UP (ref 27–34)
MCHC RBC-ENTMCNC: 31.1 GM/DL — LOW (ref 32–36)
MCV RBC AUTO: 90.2 FL — SIGNIFICANT CHANGE UP (ref 80–100)
MONOCYTES # BLD AUTO: 0.57 K/UL — SIGNIFICANT CHANGE UP (ref 0–0.9)
MONOCYTES NFR BLD AUTO: 8.3 % — SIGNIFICANT CHANGE UP (ref 2–14)
NEUTROPHILS # BLD AUTO: 4.02 K/UL — SIGNIFICANT CHANGE UP (ref 1.8–7.4)
NEUTROPHILS NFR BLD AUTO: 58.8 % — SIGNIFICANT CHANGE UP (ref 43–77)
NITRITE UR-MCNC: POSITIVE
NRBC # BLD: 0 /100 WBCS — SIGNIFICANT CHANGE UP (ref 0–0)
PH UR: 5.5 — SIGNIFICANT CHANGE UP (ref 5–8)
PLATELET # BLD AUTO: 281 K/UL — SIGNIFICANT CHANGE UP (ref 150–400)
POTASSIUM SERPL-MCNC: 4.2 MMOL/L — SIGNIFICANT CHANGE UP (ref 3.5–5.3)
POTASSIUM SERPL-SCNC: 4.2 MMOL/L — SIGNIFICANT CHANGE UP (ref 3.5–5.3)
PROT SERPL-MCNC: 6.9 G/DL — SIGNIFICANT CHANGE UP (ref 6–8.3)
PROT UR-MCNC: NEGATIVE — SIGNIFICANT CHANGE UP
RBC # BLD: 4.17 M/UL — SIGNIFICANT CHANGE UP (ref 3.8–5.2)
RBC # FLD: 13.8 % — SIGNIFICANT CHANGE UP (ref 10.3–14.5)
RBC CASTS # UR COMP ASSIST: 5 /HPF — HIGH (ref 0–4)
SODIUM SERPL-SCNC: 137 MMOL/L — SIGNIFICANT CHANGE UP (ref 135–145)
SP GR SPEC: 1.02 — SIGNIFICANT CHANGE UP (ref 1.01–1.02)
UROBILINOGEN FLD QL: NEGATIVE — SIGNIFICANT CHANGE UP
WBC # BLD: 6.84 K/UL — SIGNIFICANT CHANGE UP (ref 3.8–10.5)
WBC # FLD AUTO: 6.84 K/UL — SIGNIFICANT CHANGE UP (ref 3.8–10.5)
WBC UR QL: 0 /HPF — SIGNIFICANT CHANGE UP (ref 0–5)

## 2022-02-26 PROCEDURE — 96365 THER/PROPH/DIAG IV INF INIT: CPT

## 2022-02-26 PROCEDURE — 81001 URINALYSIS AUTO W/SCOPE: CPT

## 2022-02-26 PROCEDURE — 85025 COMPLETE CBC W/AUTO DIFF WBC: CPT

## 2022-02-26 PROCEDURE — 80053 COMPREHEN METABOLIC PANEL: CPT

## 2022-02-26 PROCEDURE — 99284 EMERGENCY DEPT VISIT MOD MDM: CPT | Mod: 25

## 2022-02-26 PROCEDURE — 99284 EMERGENCY DEPT VISIT MOD MDM: CPT

## 2022-02-26 PROCEDURE — 96361 HYDRATE IV INFUSION ADD-ON: CPT

## 2022-02-26 PROCEDURE — 87086 URINE CULTURE/COLONY COUNT: CPT

## 2022-02-26 RX ORDER — CEFPODOXIME PROXETIL 100 MG
1 TABLET ORAL
Qty: 28 | Refills: 0
Start: 2022-02-26 | End: 2022-03-11

## 2022-02-26 RX ORDER — CEFTRIAXONE 500 MG/1
1000 INJECTION, POWDER, FOR SOLUTION INTRAMUSCULAR; INTRAVENOUS ONCE
Refills: 0 | Status: COMPLETED | OUTPATIENT
Start: 2022-02-26 | End: 2022-02-26

## 2022-02-26 RX ORDER — CEFUROXIME AXETIL 250 MG
1 TABLET ORAL
Qty: 28 | Refills: 0
Start: 2022-02-26 | End: 2022-03-11

## 2022-02-26 RX ORDER — SODIUM CHLORIDE 9 MG/ML
1000 INJECTION INTRAMUSCULAR; INTRAVENOUS; SUBCUTANEOUS ONCE
Refills: 0 | Status: COMPLETED | OUTPATIENT
Start: 2022-02-26 | End: 2022-02-26

## 2022-02-26 RX ORDER — FLUCONAZOLE 150 MG/1
1 TABLET ORAL
Qty: 1 | Refills: 0
Start: 2022-02-26 | End: 2022-02-26

## 2022-02-26 RX ORDER — ACETAMINOPHEN 500 MG
650 TABLET ORAL ONCE
Refills: 0 | Status: COMPLETED | OUTPATIENT
Start: 2022-02-26 | End: 2022-02-26

## 2022-02-26 RX ORDER — FLUCONAZOLE 150 MG/1
150 TABLET ORAL ONCE
Refills: 0 | Status: COMPLETED | OUTPATIENT
Start: 2022-02-26 | End: 2022-02-26

## 2022-02-26 RX ADMIN — FLUCONAZOLE 150 MILLIGRAM(S): 150 TABLET ORAL at 14:00

## 2022-02-26 RX ADMIN — SODIUM CHLORIDE 1000 MILLILITER(S): 9 INJECTION INTRAMUSCULAR; INTRAVENOUS; SUBCUTANEOUS at 14:00

## 2022-02-26 RX ADMIN — CEFTRIAXONE 100 MILLIGRAM(S): 500 INJECTION, POWDER, FOR SOLUTION INTRAMUSCULAR; INTRAVENOUS at 13:57

## 2022-02-26 RX ADMIN — Medication 650 MILLIGRAM(S): at 14:00

## 2022-02-26 RX ADMIN — CEFTRIAXONE 1000 MILLIGRAM(S): 500 INJECTION, POWDER, FOR SOLUTION INTRAMUSCULAR; INTRAVENOUS at 14:19

## 2022-02-26 RX ADMIN — SODIUM CHLORIDE 1000 MILLILITER(S): 9 INJECTION INTRAMUSCULAR; INTRAVENOUS; SUBCUTANEOUS at 12:08

## 2022-02-26 RX ADMIN — Medication 650 MILLIGRAM(S): at 12:08

## 2022-02-26 NOTE — ED PROVIDER NOTE - OBJECTIVE STATEMENT
KALIE SAMPSON is a 46 year old female with a pmhx of UTI, complicated by bacteremia in August 2021, who presents to ED for evaluation of dysuria and white vaginal discharge she believes is a yeast infection x 1 week duration. KALIE also endorsed some back pain and had "pain in her legs" yesterday that resolved. Denies fevers, chills, nausea, vomiting, diarrhea, abdominal pain, vaginal bleeding, foul smelling vaginal discharge. No hx of surgeries, NKDA. No hx of sexually transmitted infections. LMP was recently and normal. Has 1 Male partner, monogamous, no history of STI for partner. Social - no EtOH/drugs. KALIE SAMPSON is a 46 year old female with a pmhx of UTI, complicated by bacteremia in August 2021, who presents to ED for evaluation of dysuria and white vaginal discharge she believes is a yeast infection x 1 week duration. KALIE also endorsed some back pain and had "pain in her legs" yesterday that resolved. Denies fevers, chills, nausea, vomiting, diarrhea, abdominal pain, vaginal bleeding, foul smelling vaginal discharge. No hx of surgeries, NKDA. No hx of sexually transmitted infections. LMP was recently and normal. Has 1 Male partner, monogamous, no history of STI for partner. Social - no EtOH/drugs.  Patient opt out STI testing at this visit. Seen Jan. Dx and Tx for E Coli UTI.

## 2022-02-26 NOTE — ED ADULT TRIAGE NOTE - CHIEF COMPLAINT QUOTE
suprapubic pain and b/l flank pain-- seen in ER in jan for similar symptoms   2 days of leg swelling

## 2022-02-26 NOTE — ED PROVIDER NOTE - ATTENDING CONTRIBUTION TO CARE
RGUJRAL 47yo f hx UTI presents with suprapubic discomfort and dysuria since Monday. States she has history of UTI and symptoms feel similar. Patient also complains of vaginal itching. No n/v/d, f/c. Patient is sexually active with one partner.   On exam, Patient is awake, alert and oriented x 3.  Patient is well appearing and in no acute distress.  NCAT  Neck is supple  Lungs are CTA B/L,+S1S2 no murmurs,  Abdomen:Soft nd/nt+bs no rebound or guarding. No cvat.   Extremity no edema or calf tender.  Skin with no rash.  Neuro CN3-12 intact. Strength 5/5 in upper and lower extremities. Nml Sensation.Gait normal.   Check labs, eval for UTI. IVF and re eval.

## 2022-02-26 NOTE — ED PROVIDER NOTE - PATIENT PORTAL LINK FT
You can access the FollowMyHealth Patient Portal offered by [facility] by registering at the following website: [email or web address]. By joining [employer]’s FollowMyHealth portal, you will also be able to view your health information using other applications (apps) compatible with our system.

## 2022-02-26 NOTE — ED PROVIDER NOTE - PROGRESS NOTE DETAILS
UA w/ nitrites. Will tx w/ Ceftriaxone IV. Then plan for D/C home on Cefpodoxime. Blaze Chand PA-C Will tx vulvovaginal candidiasis w/ PO Fluconazole. Blaze Chand PA-C

## 2022-02-26 NOTE — ED PROVIDER NOTE - NSFOLLOWUPINSTRUCTIONS_ED_ALL_ED_FT
Follow up with Gynecologist. Start Cefpodoxime 200mg every 12 Hours x 14 days duration for UTI.    You may use Fluconazole 150mg ONCE in 2-3 weeks after completing your antibiotics if you notice that you are having white vaginal discharge again.    Follow up with Gynecologist outpatient.    Follow up with your Primary Care Doctor.                Kidney Infection    WHAT YOU NEED TO KNOW:    A kidney infection, or pyelonephritis, is a bacterial infection. The infection usually starts in your bladder or urethra and moves into your kidney. One or both kidneys may be infected.     Kidney, Ureters, Bladder         DISCHARGE INSTRUCTIONS:    Return to the emergency department if:   •You have a fever and chills.       •You cannot stop vomiting.      •You have severe pain in your abdomen, lower back, or sides.      Contact your healthcare provider if:   •You continue to have a fever after you take antibiotics for 3 days.      •You have pain when you urinate, even after treatment.      •Your signs and symptoms return.      •You have questions or concerns about your condition or care.      Medicines: You may need any of the following:   •Antibiotics treat your bacterial infection.       •Acetaminophen decreases pain and fever. It is available without a doctor's order. Ask how much to take and how often to take it. Follow directions. Read the labels of all other medicines you are using to see if they also contain acetaminophen, or ask your doctor or pharmacist. Acetaminophen can cause liver damage if not taken correctly. Do not use more than 4 grams (4,000 milligrams) total of acetaminophen in one day.       •NSAIDs, such as ibuprofen, help decrease swelling, pain, and fever. This medicine is available with or without a doctor's order. NSAIDs can cause stomach bleeding or kidney problems in certain people. If you take blood thinner medicine, always ask if NSAIDs are safe for you. Always read the medicine label and follow directions. Do not give these medicines to children under 6 months of age without direction from your child's healthcare provider.      •Prescription pain medicine may be given. Ask how to take this medicine safely.      •Take your medicine as directed. Contact your healthcare provider if you think your medicine is not helping or if you have side effects. Tell him of her if you are allergic to any medicine. Keep a list of the medicines, vitamins, and herbs you take. Include the amounts, and when and why you take them. Bring the list or the pill bottles to follow-up visits. Carry your medicine list with you in case of an emergency.      Drink liquids as directed: You may need to drink extra liquids to help flush your kidneys and urinary system. Water is the best liquid to drink. Ask your healthcare provider how much liquid to drink each day and which liquids are best for you.    Urinate as soon as you feel the urge: This will help flush bacteria from your urinary system. Do not wait or hold your urine for too long.     Clean your genital area every day with soap and water: Wipe from front to back after you urinate or have a bowel movement. Wear cotton underwear. Fabrics such as nylon and polyester can stay damp. This can increase your risk for infection. Urinate within 15 minutes after you have sex.    Follow up with your doctor as directed: Write down your questions so you remember to ask them during your visits.

## 2022-02-26 NOTE — ED POST DISCHARGE NOTE - ADDITIONAL DOCUMENTATION
2/26/22: pharmacist from [facility] called after pt discharge requesting alternate medication as medicaid does not cover cefpodoxime, will switch to cefuroxime, d/w Dr. Tapia. -Sushma Bhatt PA-C

## 2022-02-27 LAB
CULTURE RESULTS: SIGNIFICANT CHANGE UP
SPECIMEN SOURCE: SIGNIFICANT CHANGE UP

## 2022-02-28 ENCOUNTER — NON-APPOINTMENT (OUTPATIENT)
Age: 47
End: 2022-02-28

## 2022-04-20 ENCOUNTER — OUTPATIENT (OUTPATIENT)
Dept: OUTPATIENT SERVICES | Facility: HOSPITAL | Age: 47
LOS: 1 days | End: 2022-04-20
Payer: SELF-PAY

## 2022-04-20 ENCOUNTER — TRANSCRIPTION ENCOUNTER (OUTPATIENT)
Age: 47
End: 2022-04-20

## 2022-04-20 ENCOUNTER — APPOINTMENT (OUTPATIENT)
Dept: INTERNAL MEDICINE | Facility: CLINIC | Age: 47
End: 2022-04-20
Payer: COMMERCIAL

## 2022-04-20 ENCOUNTER — LABORATORY RESULT (OUTPATIENT)
Age: 47
End: 2022-04-20

## 2022-04-20 VITALS
HEART RATE: 70 BPM | WEIGHT: 122 LBS | HEIGHT: 65 IN | BODY MASS INDEX: 20.33 KG/M2 | DIASTOLIC BLOOD PRESSURE: 66 MMHG | SYSTOLIC BLOOD PRESSURE: 128 MMHG | OXYGEN SATURATION: 97 %

## 2022-04-20 DIAGNOSIS — F41.9 ANXIETY DISORDER, UNSPECIFIED: ICD-10-CM

## 2022-04-20 DIAGNOSIS — I10 ESSENTIAL (PRIMARY) HYPERTENSION: ICD-10-CM

## 2022-04-20 DIAGNOSIS — Z00.00 ENCOUNTER FOR GENERAL ADULT MEDICAL EXAMINATION W/OUT ABNORMAL FINDINGS: ICD-10-CM

## 2022-04-20 DIAGNOSIS — G47.00 INSOMNIA, UNSPECIFIED: ICD-10-CM

## 2022-04-20 PROCEDURE — 80053 COMPREHEN METABOLIC PANEL: CPT

## 2022-04-20 PROCEDURE — 80061 LIPID PANEL: CPT

## 2022-04-20 PROCEDURE — 82306 VITAMIN D 25 HYDROXY: CPT

## 2022-04-20 PROCEDURE — 87389 HIV-1 AG W/HIV-1&-2 AB AG IA: CPT

## 2022-04-20 PROCEDURE — 81001 URINALYSIS AUTO W/SCOPE: CPT

## 2022-04-20 PROCEDURE — 85025 COMPLETE CBC W/AUTO DIFF WBC: CPT

## 2022-04-20 PROCEDURE — G0463: CPT

## 2022-04-20 PROCEDURE — 87086 URINE CULTURE/COLONY COUNT: CPT

## 2022-04-20 PROCEDURE — ZZZZZ: CPT

## 2022-04-20 PROCEDURE — 83036 HEMOGLOBIN GLYCOSYLATED A1C: CPT

## 2022-04-20 PROCEDURE — 84443 ASSAY THYROID STIM HORMONE: CPT

## 2022-04-20 PROCEDURE — 82607 VITAMIN B-12: CPT

## 2022-04-20 PROCEDURE — 86803 HEPATITIS C AB TEST: CPT

## 2022-04-20 NOTE — ASSESSMENT
[FreeTextEntry1] : MS. Torres is a 47 yo F with a history of pyelonephritis 8/21 who presents for CPE. \par \par #Recurrent UTIs\par - Patient with 2 confirmed UTIs (pyelo 8/21, cystitis 1/22), 2 more episodes of symptoms w/n past 8 months\par - Presenting today with UTI symptoms\par - Send urinalysis with microscopy, urine culture\par - Will hold off on treatment pending results\par - Urology referral provided for recurrent symptoms\par - OB/GYN follow up recommended\par \par #Insomnia\par - Sleep hygiene explained\par - Melatonin 5mg recommended\par - Felicia Umanzor contacted for initiation of therapy\par - Stress management techniques discussed\par \par #HCM\par - Will discuss vaccine status at next visit\par - Mammogram script provided\par - GI referral for colonoscopy provided\par - Labs: CBC, CMP, lipid panel, A1C, TSH, vit D, vit B12, HIV, HCV\par - Reinforced healthy diet, exercise\par - RTC 5 weeks (TEB vs. in person) for symptom f/u\par \par Case discussed with Dr. Magaña\par \par Trevor Pacheco MD\par PGY-1

## 2022-04-20 NOTE — HISTORY OF PRESENT ILLNESS
[Patient Declined  Services] : - None: Patient declined  services [FreeTextEntry1] : CPE [TWNoteComboBox1] : Danish [de-identified] : MS. Torres is a 45 yo F with a history of pyelonephritis 8/21 who presents for CPE. \par \par Since her last visit (post-hospitalization in 9/21), she has had 3 ED visits, two of which were for recurrent UTI symptoms. She was treated in 1/30 empirically for gonorrhea/chlamydia with ceftriaxone/doxycycline, though amplifications were negative. She was also treated with Keflex at that time. Urine culture grew pan-sensitive E. coli. She had another ED visit 2/26 for dysuria and white vaginal discharge. At that time she was treated with cefpodoxime x14 days and given fluconazole to take once following completion of the antibiotic therapy. Urinalysis and urine culture were unremarkable at that time. \par \par Since then, she had improved symptoms. Starting about 7 days ago, she started to have recurrent dysuria associated with suprapubic, low back pain and urinary urgency. She reports good hygiene, appropriate wiping, cleaning and voiding after coitus. She denies fevers, chills, syncope, weakness. \par \par In addition, she is reporting significant stress and exhaustion. She thinks this may be due to working her full time schedule along with her discomfort from recurrent UTIs. She is also having difficulty falling asleep. She is inquiring about ways in which she could improve her sleep. \par \par Other than the above symptoms, she reports staying very active physically at her job. She reports eating a well balanced diet. She used to smoke 1 cigarette per day for the past 3 years with her boyfriend but recently stopped. She has been monogamous for the past 3 years. She is sexually active only with her partner.

## 2022-04-20 NOTE — PHYSICAL EXAM
[No Acute Distress] : no acute distress [Well Nourished] : well nourished [Well Developed] : well developed [Well-Appearing] : well-appearing [Normal Sclera/Conjunctiva] : normal sclera/conjunctiva [PERRL] : pupils equal round and reactive to light [EOMI] : extraocular movements intact [Normal Outer Ear/Nose] : the outer ears and nose were normal in appearance [Normal Oropharynx] : the oropharynx was normal [Normal TMs] : both tympanic membranes were normal [No JVD] : no jugular venous distention [No Lymphadenopathy] : no lymphadenopathy [Supple] : supple [Thyroid Normal, No Nodules] : the thyroid was normal and there were no nodules present [No Respiratory Distress] : no respiratory distress  [No Accessory Muscle Use] : no accessory muscle use [Clear to Auscultation] : lungs were clear to auscultation bilaterally [Normal Rate] : normal rate  [Regular Rhythm] : with a regular rhythm [Normal S1, S2] : normal S1 and S2 [No Murmur] : no murmur heard [No Edema] : there was no peripheral edema [Soft] : abdomen soft [Non Tender] : non-tender [Non-distended] : non-distended [No HSM] : no HSM [Normal Bowel Sounds] : normal bowel sounds [Normal Posterior Cervical Nodes] : no posterior cervical lymphadenopathy [Normal Anterior Cervical Nodes] : no anterior cervical lymphadenopathy [No CVA Tenderness] : no CVA  tenderness [No Spinal Tenderness] : no spinal tenderness [No Joint Swelling] : no joint swelling [Grossly Normal Strength/Tone] : grossly normal strength/tone [No Rash] : no rash [Coordination Grossly Intact] : coordination grossly intact [No Focal Deficits] : no focal deficits [Normal Gait] : normal gait [Normal Affect] : the affect was normal [Normal Insight/Judgement] : insight and judgment were intact

## 2022-04-20 NOTE — REVIEW OF SYSTEMS
[Fatigue] : fatigue [Dysuria] : dysuria [Nocturia] : nocturia [Frequency] : frequency [Vaginal Discharge] : vaginal discharge [Anxiety] : anxiety [Fever] : no fever [Chills] : no chills [Night Sweats] : no night sweats [Recent Change In Weight] : ~T no recent weight change [Vision Problems] : no vision problems [Earache] : no earache [Hearing Loss] : no hearing loss [Chest Pain] : no chest pain [Palpitations] : no palpitations [Lower Ext Edema] : no lower extremity edema [Orthopnea] : no orthopnea [Shortness Of Breath] : no shortness of breath [Wheezing] : no wheezing [Cough] : no cough [Dyspnea on Exertion] : no dyspnea on exertion [Abdominal Pain] : no abdominal pain [Nausea] : no nausea [Constipation] : no constipation [Diarrhea] : diarrhea [Vomiting] : no vomiting [Incontinence] : no incontinence [Hematuria] : no hematuria [Headache] : no headache [Dizziness] : no dizziness [Confusion] : no confusion [Suicidal] : not suicidal [Insomnia] : no insomnia [Depression] : no depression

## 2022-04-20 NOTE — HEALTH RISK ASSESSMENT
[Good] : ~his/her~  mood as  good [Former] : Former [Yes] : Yes [Monthly or less (1 pt)] : Monthly or less (1 point) [1 or 2 (0 pts)] : 1 or 2 (0 points) [Never (0 pts)] : Never (0 points) [PHQ-2 Negative - No further assessment needed] : PHQ-2 Negative - No further assessment needed [Patient reported mammogram was normal] : Patient reported mammogram was normal [None] : None [Sexually Active] : sexually active [Intercurrent ED visits] : went to ED [0-4] : 0-4 [No falls in past year] : Patient reported no falls in the past year [0] : 2) Feeling down, depressed, or hopeless: Not at all (0) [Patient reported PAP Smear was normal] : Patient reported PAP Smear was normal [Patient reported colonoscopy was normal] : Patient reported colonoscopy was normal [HIV Test offered] : HIV Test offered [Hepatitis C test offered] : Hepatitis C test offered [Alone] : lives alone [Employed] : employed [Significant Other] : lives with significant other [Feels Safe at Home] : Feels safe at home [Fully functional (bathing, dressing, toileting, transferring, walking, feeding)] : Fully functional (bathing, dressing, toileting, transferring, walking, feeding) [Fully functional (using the telephone, shopping, preparing meals, housekeeping, doing laundry, using] : Fully functional and needs no help or supervision to perform IADLs (using the telephone, shopping, preparing meals, housekeeping, doing laundry, using transportation, managing medications and managing finances) [Reports normal functional visual acuity (ie: able to read med bottle)] : Reports normal functional visual acuity [FreeTextEntry1] : Urinary tract infections [de-identified] : Physically active at work as  [de-identified] : Well-balanced, fruits, veggies, protein [[id number]] : 0 [High Risk Behavior] : no high risk behavior [Reports changes in hearing] : Reports no changes in hearing [Reports changes in vision] : Reports no changes in vision [Reports changes in dental health] : Reports no changes in dental health [MammogramDate] : 4/21 [PapSmearDate] : 7/20 [ColonoscopyDate] : 8/21 [ColonoscopyComments] : FIT [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

## 2022-04-21 DIAGNOSIS — E53.8 DEFICIENCY OF OTHER SPECIFIED B GROUP VITAMINS: ICD-10-CM

## 2022-04-21 DIAGNOSIS — N39.0 URINARY TRACT INFECTION, SITE NOT SPECIFIED: ICD-10-CM

## 2022-04-21 DIAGNOSIS — G47.00 INSOMNIA, UNSPECIFIED: ICD-10-CM

## 2022-04-21 LAB
25(OH)D3 SERPL-MCNC: 21.9 NG/ML
ALBUMIN SERPL ELPH-MCNC: 4.5 G/DL
ALP BLD-CCNC: 68 U/L
ALT SERPL-CCNC: 5 U/L
ANION GAP SERPL CALC-SCNC: 12 MMOL/L
APPEARANCE: ABNORMAL
APPEARANCE: ABNORMAL
AST SERPL-CCNC: 18 U/L
BACTERIA: NEGATIVE
BASOPHILS # BLD AUTO: 0.05 K/UL
BASOPHILS NFR BLD AUTO: 0.6 %
BILIRUB SERPL-MCNC: 0.8 MG/DL
BILIRUBIN URINE: NEGATIVE
BILIRUBIN URINE: NEGATIVE
BLOOD URINE: NEGATIVE
BLOOD URINE: NEGATIVE
BUN SERPL-MCNC: 13 MG/DL
CALCIUM SERPL-MCNC: 9.6 MG/DL
CHLORIDE SERPL-SCNC: 103 MMOL/L
CHOLEST SERPL-MCNC: 191 MG/DL
CO2 SERPL-SCNC: 23 MMOL/L
COLOR: NORMAL
COLOR: NORMAL
CREAT SERPL-MCNC: 0.52 MG/DL
EGFR: 115 ML/MIN/1.73M2
EOSINOPHIL # BLD AUTO: 0.4 K/UL
EOSINOPHIL NFR BLD AUTO: 4.8 %
ESTIMATED AVERAGE GLUCOSE: 108 MG/DL
GLUCOSE QUALITATIVE U: NEGATIVE
GLUCOSE QUALITATIVE U: NEGATIVE
GLUCOSE SERPL-MCNC: 92 MG/DL
HBA1C MFR BLD HPLC: 5.4 %
HCT VFR BLD CALC: 40.4 %
HCV AB SER QL: NONREACTIVE
HCV S/CO RATIO: 0.36 S/CO
HDLC SERPL-MCNC: 64 MG/DL
HGB BLD-MCNC: 12.4 G/DL
HIV1+2 AB SPEC QL IA.RAPID: NONREACTIVE
HYALINE CASTS: 0 /LPF
IMM GRANULOCYTES NFR BLD AUTO: 0.6 %
KETONES URINE: NEGATIVE
KETONES URINE: NEGATIVE
LDLC SERPL CALC-MCNC: 112 MG/DL
LEUKOCYTE ESTERASE URINE: NEGATIVE
LEUKOCYTE ESTERASE URINE: NEGATIVE
LYMPHOCYTES # BLD AUTO: 2.45 K/UL
LYMPHOCYTES NFR BLD AUTO: 29.1 %
MAN DIFF?: NORMAL
MCHC RBC-ENTMCNC: 27.3 PG
MCHC RBC-ENTMCNC: 30.7 GM/DL
MCV RBC AUTO: 88.8 FL
MICROSCOPIC-UA: NORMAL
MONOCYTES # BLD AUTO: 0.54 K/UL
MONOCYTES NFR BLD AUTO: 6.4 %
NEUTROPHILS # BLD AUTO: 4.92 K/UL
NEUTROPHILS NFR BLD AUTO: 58.5 %
NITRITE URINE: NEGATIVE
NITRITE URINE: NEGATIVE
NONHDLC SERPL-MCNC: 128 MG/DL
PH URINE: 7.5
PH URINE: 7.5
PLATELET # BLD AUTO: 313 K/UL
POTASSIUM SERPL-SCNC: 4.5 MMOL/L
PROT SERPL-MCNC: 7.5 G/DL
PROTEIN URINE: NEGATIVE
PROTEIN URINE: NEGATIVE
RBC # BLD: 4.55 M/UL
RBC # FLD: 14.1 %
RED BLOOD CELLS URINE: 3 /HPF
SODIUM SERPL-SCNC: 138 MMOL/L
SPECIFIC GRAVITY URINE: 1.02
SPECIFIC GRAVITY URINE: 1.02
SQUAMOUS EPITHELIAL CELLS: 4 /HPF
TRIGL SERPL-MCNC: 80 MG/DL
TSH SERPL-ACNC: 1.39 UIU/ML
UROBILINOGEN URINE: NORMAL
UROBILINOGEN URINE: NORMAL
VIT B12 SERPL-MCNC: 217 PG/ML
WBC # FLD AUTO: 8.41 K/UL
WHITE BLOOD CELLS URINE: 0 /HPF

## 2022-04-22 ENCOUNTER — NON-APPOINTMENT (OUTPATIENT)
Age: 47
End: 2022-04-22

## 2022-04-22 LAB — BACTERIA UR CULT: NORMAL

## 2022-05-03 ENCOUNTER — RESULT REVIEW (OUTPATIENT)
Age: 47
End: 2022-05-03

## 2022-05-03 ENCOUNTER — LABORATORY RESULT (OUTPATIENT)
Age: 47
End: 2022-05-03

## 2022-05-03 ENCOUNTER — OUTPATIENT (OUTPATIENT)
Dept: OUTPATIENT SERVICES | Facility: HOSPITAL | Age: 47
LOS: 1 days | End: 2022-05-03
Payer: SELF-PAY

## 2022-05-03 ENCOUNTER — APPOINTMENT (OUTPATIENT)
Dept: OBGYN | Facility: CLINIC | Age: 47
End: 2022-05-03
Payer: COMMERCIAL

## 2022-05-03 VITALS — DIASTOLIC BLOOD PRESSURE: 70 MMHG | WEIGHT: 124 LBS | BODY MASS INDEX: 20.63 KG/M2 | SYSTOLIC BLOOD PRESSURE: 120 MMHG

## 2022-05-03 DIAGNOSIS — N76.0 ACUTE VAGINITIS: ICD-10-CM

## 2022-05-03 PROCEDURE — 87800 DETECT AGNT MULT DNA DIREC: CPT

## 2022-05-03 PROCEDURE — 81002 URINALYSIS NONAUTO W/O SCOPE: CPT

## 2022-05-03 PROCEDURE — G0463: CPT

## 2022-05-03 PROCEDURE — 99213 OFFICE O/P EST LOW 20 MIN: CPT | Mod: 25

## 2022-05-03 PROCEDURE — 36415 COLL VENOUS BLD VENIPUNCTURE: CPT

## 2022-05-03 NOTE — HISTORY OF PRESENT ILLNESS
[FreeTextEntry1] : 46yoP2 (LMP 4/30/22) presents complaining of recurring UTI. Pt referred here by medicine.  Pt has long h/o frequent UTI.  S/p Pyelo 8/2021 with 3 subsequent ED visits after for recurrent UTI. Pt notes that sex is definitvely an inciting factor.  Pt asymptomatic today. Denies dysuria/ fever/ back pain.  Pt denies vaginal symptoms- itching/ burning/ odor. \par \par \par

## 2022-05-03 NOTE — DISCUSSION/SUMMARY
[FreeTextEntry1] : 47yo P2-  recurrent UTI issues\par - [ ]ucx sent\par - discussed bladder hygiene \par - sex is inciting factor :  [ ]estrace (pea size to vag opening 1xwk) [ ] Macrobid post coital \par \par \par RTC for annual gyn exam  [ ]Mammo rx given\par Luis Urbina, PAC

## 2022-05-04 ENCOUNTER — TRANSCRIPTION ENCOUNTER (OUTPATIENT)
Age: 47
End: 2022-05-04

## 2022-05-04 LAB
CANDIDA AB TITR SER: DETECTED
G VAGINALIS DNA SPEC QL NAA+PROBE: SIGNIFICANT CHANGE UP
T VAGINALIS SPEC QL WET PREP: SIGNIFICANT CHANGE UP

## 2022-05-10 DIAGNOSIS — N39.0 URINARY TRACT INFECTION, SITE NOT SPECIFIED: ICD-10-CM

## 2022-05-10 DIAGNOSIS — B37.3 CANDIDIASIS OF VULVA AND VAGINA: ICD-10-CM

## 2022-05-14 ENCOUNTER — OUTPATIENT (OUTPATIENT)
Dept: OUTPATIENT SERVICES | Facility: HOSPITAL | Age: 47
LOS: 1 days | End: 2022-05-14
Payer: SELF-PAY

## 2022-05-14 ENCOUNTER — APPOINTMENT (OUTPATIENT)
Dept: MAMMOGRAPHY | Facility: IMAGING CENTER | Age: 47
End: 2022-05-14
Payer: COMMERCIAL

## 2022-05-14 ENCOUNTER — RESULT REVIEW (OUTPATIENT)
Age: 47
End: 2022-05-14

## 2022-05-14 DIAGNOSIS — Z00.8 ENCOUNTER FOR OTHER GENERAL EXAMINATION: ICD-10-CM

## 2022-05-14 PROCEDURE — 77063 BREAST TOMOSYNTHESIS BI: CPT | Mod: 26

## 2022-05-14 PROCEDURE — 77063 BREAST TOMOSYNTHESIS BI: CPT

## 2022-05-14 PROCEDURE — 77067 SCR MAMMO BI INCL CAD: CPT

## 2022-05-14 PROCEDURE — 77067 SCR MAMMO BI INCL CAD: CPT | Mod: 26

## 2022-05-23 ENCOUNTER — APPOINTMENT (OUTPATIENT)
Dept: UROLOGY | Facility: HOSPITAL | Age: 47
End: 2022-05-23

## 2022-05-23 ENCOUNTER — OUTPATIENT (OUTPATIENT)
Dept: OUTPATIENT SERVICES | Facility: HOSPITAL | Age: 47
LOS: 1 days | End: 2022-05-23
Payer: SELF-PAY

## 2022-05-23 VITALS
RESPIRATION RATE: 18 BRPM | DIASTOLIC BLOOD PRESSURE: 72 MMHG | HEIGHT: 65 IN | TEMPERATURE: 97 F | HEART RATE: 70 BPM | WEIGHT: 121 LBS | SYSTOLIC BLOOD PRESSURE: 101 MMHG | BODY MASS INDEX: 20.16 KG/M2

## 2022-05-23 DIAGNOSIS — N39.0 URINARY TRACT INFECTION, SITE NOT SPECIFIED: ICD-10-CM

## 2022-05-23 DIAGNOSIS — R30.0 DYSURIA: ICD-10-CM

## 2022-05-23 PROCEDURE — G0463: CPT

## 2022-05-23 PROCEDURE — 51798 US URINE CAPACITY MEASURE: CPT

## 2022-05-23 NOTE — ASSESSMENT
[FreeTextEntry1] : 46F with frequent UTIs. Most recent gyn visit with negative UCx however +candidal vaginosis\par \par PVR 0cc\par \par - s/p completed course of diflucan\par - Encouraged significantly increased water intake (>2L)\par - Stool softeners to address constipation\par - Start intravaginal estrogen\par - Followup in 4-6 months to assess results with this strategy

## 2022-05-23 NOTE — HISTORY OF PRESENT ILLNESS
[FreeTextEntry1] : 46 year old female presents due to multiple UTI and recurrent urinary symptoms. S/p Pyelo 8/2021 with 3 subsequent ED visits after for recurrent UTI. Had urinary symptoms recently and was seen by gyn. UCx negative but vaginal culture positive for candida. Now s/p diflucan course. Was prescribed intravaginal estrogen but has not yet started due to insurance issues.\par Currently not experiencing any irritative voiding. Feels like she voids in small amounts. No frequency or urgency. No fevers. Notes she does have constipation. Takes metamucil. Does not stool daily.\par PVR 0cc\par \par No hx of tobacco use.\par \par \par

## 2022-05-23 NOTE — PHYSICAL EXAM
[General Appearance - Well Developed] : well developed [General Appearance - Well Nourished] : well nourished [Edema] : no peripheral edema [] : no respiratory distress [Exaggerated Use Of Accessory Muscles For Inspiration] : no accessory muscle use [Abdomen Soft] : soft [Abdomen Tenderness] : non-tender [Normal Station and Gait] : the gait and station were normal for the patient's age [Skin Color & Pigmentation] : normal skin color and pigmentation [Oriented To Time, Place, And Person] : oriented to person, place, and time [Not Anxious] : not anxious

## 2022-05-24 ENCOUNTER — APPOINTMENT (OUTPATIENT)
Dept: INTERNAL MEDICINE | Facility: CLINIC | Age: 47
End: 2022-05-24

## 2022-07-07 NOTE — ED ADULT TRIAGE NOTE - CCCP TRG CHIEF CMPLNT
breast pain Please follow up with your primary care doctor within 1 week.  Follow up with the [facility] by calling [phone].    Take acetaminophen 500-1000mg every 6 hrs as needed for pain. DO NOT EXCEED 4000mg DAILY.  Take ibuprofen 400-600mg every 6 hrs as needed for pain. Take with food    Ambulate as tolerated    Return to the ED for worsening pain, numbness, tingling, weakness, difficulty walking, difficulty urinating/urinary incontinence, numbness in your groin/buttocks, or any other concerns. Please follow up with your primary care doctor within 1 week.  You will be contacted by our referral coordinator to establish care with a SPINE doctor - if you do not receive a call within 48hrs you may call [phone].     Take acetaminophen 500-1000mg every 6 hrs as needed for pain. DO NOT EXCEED 4000mg DAILY.  Take ibuprofen 800mg every 8 hrs as needed for pain. Take with food    Ambulate as tolerated    Return to the ED for worsening pain, numbness, tingling, weakness, difficulty walking, difficulty urinating/urinary incontinence, numbness in your groin/buttocks, or any other concerns.

## 2022-07-27 NOTE — ED CDU PROVIDER INITIAL DAY NOTE - CPE EDP RESP NORM
Patient's FSGs are controlled on current medication regimen.  Last A1c reviewed- No results found for: LABA1C, HGBA1C  Most recent fingerstick glucose reviewed- No results for input(s): POCTGLUCOSE in the last 24 hours.  Current correctional scale  Medium  Maintain anti-hyperglycemic dose as follows-   Antihyperglycemics (From admission, onward)            None        Hold Oral hypoglycemics while patient is in the hospital.   normal...

## 2022-11-29 ENCOUNTER — APPOINTMENT (OUTPATIENT)
Dept: GASTROENTEROLOGY | Facility: HOSPITAL | Age: 47
End: 2022-11-29

## 2022-12-14 NOTE — PATIENT PROFILE ADULT - HOME ACCESSIBILITY CONCERNS
Patient is in the supine position.   The body was positioned using the following devices: blanket.  The patient was positioned by Alison Hernandez RN, Ben Lomax none

## 2023-01-03 ENCOUNTER — NON-APPOINTMENT (OUTPATIENT)
Age: 48
End: 2023-01-03

## 2023-01-06 ENCOUNTER — OUTPATIENT (OUTPATIENT)
Dept: OUTPATIENT SERVICES | Facility: HOSPITAL | Age: 48
LOS: 1 days | End: 2023-01-06
Payer: SELF-PAY

## 2023-01-06 ENCOUNTER — APPOINTMENT (OUTPATIENT)
Dept: INTERNAL MEDICINE | Facility: CLINIC | Age: 48
End: 2023-01-06
Payer: COMMERCIAL

## 2023-01-06 VITALS
HEART RATE: 80 BPM | BODY MASS INDEX: 20.66 KG/M2 | HEIGHT: 65 IN | DIASTOLIC BLOOD PRESSURE: 70 MMHG | OXYGEN SATURATION: 97 % | WEIGHT: 124 LBS | SYSTOLIC BLOOD PRESSURE: 100 MMHG

## 2023-01-06 DIAGNOSIS — I10 ESSENTIAL (PRIMARY) HYPERTENSION: ICD-10-CM

## 2023-01-06 DIAGNOSIS — N89.8 OTHER SPECIFIED NONINFLAMMATORY DISORDERS OF VAGINA: ICD-10-CM

## 2023-01-06 PROCEDURE — ZZZZZ: CPT | Mod: GC

## 2023-01-06 PROCEDURE — G0463: CPT

## 2023-01-06 RX ORDER — FLUCONAZOLE 150 MG/1
150 TABLET ORAL
Qty: 3 | Refills: 3 | Status: DISCONTINUED | COMMUNITY
Start: 2022-05-05 | End: 2023-01-06

## 2023-01-06 RX ORDER — CLOTRIMAZOLE AND BETAMETHASONE DIPROPIONATE 10; .5 MG/G; MG/G
1-0.05 CREAM TOPICAL TWICE DAILY
Qty: 1 | Refills: 0 | Status: DISCONTINUED | COMMUNITY
Start: 2020-07-09 | End: 2023-01-06

## 2023-01-06 RX ORDER — CLOTRIMAZOLE AND BETAMETHASONE DIPROPIONATE 10; .5 MG/G; MG/G
1-0.05 CREAM TOPICAL
Qty: 1 | Refills: 0 | Status: DISCONTINUED | OUTPATIENT
Start: 2020-07-07 | End: 2023-01-06

## 2023-01-06 RX ORDER — FLUCONAZOLE 150 MG/1
150 TABLET ORAL
Qty: 3 | Refills: 1 | Status: DISCONTINUED | COMMUNITY
Start: 2021-01-11 | End: 2023-01-06

## 2023-01-06 RX ORDER — HYDROCORTISONE ACETATE 1 G/100G
1 CREAM TOPICAL
Qty: 1 | Refills: 1 | Status: DISCONTINUED | COMMUNITY
Start: 2021-03-22 | End: 2023-01-06

## 2023-01-06 RX ORDER — NITROFURANTOIN (MONOHYDRATE/MACROCRYSTALS) 25; 75 MG/1; MG/1
100 CAPSULE ORAL
Qty: 30 | Refills: 0 | Status: DISCONTINUED | COMMUNITY
Start: 2022-05-03 | End: 2023-01-06

## 2023-01-07 LAB
APPEARANCE: CLEAR
BILIRUBIN URINE: NEGATIVE
BLOOD URINE: NEGATIVE
COLOR: NORMAL
GLUCOSE QUALITATIVE U: NEGATIVE
HIV1+2 AB SPEC QL IA.RAPID: NONREACTIVE
KETONES URINE: NEGATIVE
LEUKOCYTE ESTERASE URINE: NEGATIVE
NITRITE URINE: NEGATIVE
PH URINE: 6
PROTEIN URINE: NEGATIVE
SPECIFIC GRAVITY URINE: 1.01
T PALLIDUM AB SER QL IA: NEGATIVE
UROBILINOGEN URINE: NORMAL

## 2023-01-07 NOTE — ASSESSMENT
[FreeTextEntry1] : # Dysuria and vaginal discharge: Vaginal infection (e.g. BV, yeast) vs UTI; s/p doxy x 10 days. Stable dysuria, suprapubic discomfort, white clumpy discharge, without f/c, n/v. Non-toxic appearing.\par - f/u UA with UCx\par - f/u BV panel\par - f/u GC/Chlamydia, Trep, HIV panel\par - plan to call pt on Monday with results\par - f/u urology, last seen in May, plan was f/u in 4-6 mo; order in.\par - Pelvic US ordered

## 2023-01-07 NOTE — REVIEW OF SYSTEMS
[Abdominal Pain] : abdominal pain [Dysuria] : dysuria [Vaginal Discharge] : vaginal discharge [Fever] : no fever [Chills] : no chills [Pain] : no pain [Hearing Loss] : no hearing loss [Sore Throat] : no sore throat [Chest Pain] : no chest pain [Shortness Of Breath] : no shortness of breath [Nausea] : no nausea [Vomiting] : no vomiting [Joint Pain] : no joint pain [Muscle Pain] : no muscle pain [Skin Rash] : no skin rash [Headache] : no headache

## 2023-01-07 NOTE — PHYSICAL EXAM
[No Acute Distress] : no acute distress [Well Nourished] : well nourished [Well Developed] : well developed [Well-Appearing] : well-appearing [Normal Sclera/Conjunctiva] : normal sclera/conjunctiva [EOMI] : extraocular movements intact [Normal Outer Ear/Nose] : the outer ears and nose were normal in appearance [No Respiratory Distress] : no respiratory distress  [Normal Rate] : normal rate  [Soft] : abdomen soft [No Spinal Tenderness] : no spinal tenderness [No Joint Swelling] : no joint swelling [Grossly Normal Strength/Tone] : grossly normal strength/tone [No Focal Deficits] : no focal deficits [Normal Gait] : normal gait [Normal Affect] : the affect was normal [Normal Insight/Judgement] : insight and judgment were intact [de-identified] : Tenderness to deep palpation in pelvic area, R>L, no rebound/guarding [de-identified] : External genitals grossly normal, no erythema. Pelvic exam sig for waterhy white discharge. Cervical os unable to be visualized. [de-identified] : L CVA tenderness

## 2023-01-07 NOTE — HISTORY OF PRESENT ILLNESS
[FreeTextEntry1] : Dysuria, vaginal discharge [de-identified] : 46F with frequent UTIs and pyelonephritis Aug 2021, vaginal candida infection, presents for dysuria and vaginal discharge.\par \par # Dysuria and vaginal discharge\par - Went to [facility] ED 12/23 for dysuria, back pain, suprapubic discomfort, vaginal discharge, preceded by "fishy smell" during sex. Was diagnosed with UTI and yeast infection, given doxy x 10 days. Per pt, was supposed to get a call with lab results, but did not receive a call.\par - Dysuria, back pain, suprapubic discomfort, white clumpy discharge continued despite being compliant on meds. Still endorsing above symptoms.\par - Sexually active with 1 partner. Do not use barrier contraception. Partner also complaining of dysuria. Partner is being seen by own PCP today.\par - Last seen by urology May 2022, plan was to start intravaginal estrogen and RTC 4-6 mo for follow-up. No follow-up records or appt in system. Patient unaware intravaginal estrogen was the plan, did not start estrogen.

## 2023-01-09 LAB
C TRACH RRNA SPEC QL NAA+PROBE: NOT DETECTED
CANDIDA VAG CYTO: NOT DETECTED
G VAGINALIS+PREV SP MTYP VAG QL MICRO: NOT DETECTED
N GONORRHOEA RRNA SPEC QL NAA+PROBE: NOT DETECTED
SOURCE AMPLIFICATION: NORMAL
T VAGINALIS VAG QL WET PREP: NOT DETECTED

## 2023-01-09 NOTE — H&P ADULT - PROBLEM SELECTOR PLAN 3
Written and verbal instructions provided to patient and patient's daughter. Opportunity for questions provided, answered. Tolerating PO liquids without difficulty. Dr. Hickman came to discuss results with patient prior to discharge.     CT AP with evidence of ascending urinary infection c/w acute pyelonephritis  - f/u daily CMP to monitor renal function  - c/w abx as above  - consider CT urogram or renal u/s to evaluate for ureter patency and hydronephrosis if symptoms do not improve CT AP with evidence of ascending urinary infection c/w acute pyelonephritis  - f/u daily CMP to monitor renal function  - c/w abx as above  - Urology c/s for potential ureter stricture or obstruction

## 2023-01-10 DIAGNOSIS — N89.8 OTHER SPECIFIED NONINFLAMMATORY DISORDERS OF VAGINA: ICD-10-CM

## 2023-01-10 DIAGNOSIS — N39.0 URINARY TRACT INFECTION, SITE NOT SPECIFIED: ICD-10-CM

## 2023-01-10 DIAGNOSIS — B37.31 ACUTE CANDIDIASIS OF VULVA AND VAGINA: ICD-10-CM

## 2023-01-30 ENCOUNTER — APPOINTMENT (OUTPATIENT)
Dept: OBGYN | Facility: CLINIC | Age: 48
End: 2023-01-30

## 2023-02-07 ENCOUNTER — OUTPATIENT (OUTPATIENT)
Dept: OUTPATIENT SERVICES | Facility: HOSPITAL | Age: 48
LOS: 1 days | End: 2023-02-07
Payer: SELF-PAY

## 2023-02-07 ENCOUNTER — LABORATORY RESULT (OUTPATIENT)
Age: 48
End: 2023-02-07

## 2023-02-07 ENCOUNTER — APPOINTMENT (OUTPATIENT)
Dept: OBGYN | Facility: CLINIC | Age: 48
End: 2023-02-07
Payer: COMMERCIAL

## 2023-02-07 DIAGNOSIS — N76.0 ACUTE VAGINITIS: ICD-10-CM

## 2023-02-07 PROCEDURE — 99213 OFFICE O/P EST LOW 20 MIN: CPT | Mod: GC

## 2023-02-07 PROCEDURE — 36415 COLL VENOUS BLD VENIPUNCTURE: CPT

## 2023-02-07 PROCEDURE — G0463: CPT

## 2023-02-07 PROCEDURE — 87800 DETECT AGNT MULT DNA DIREC: CPT

## 2023-02-08 LAB
CANDIDA AB TITR SER: SIGNIFICANT CHANGE UP
G VAGINALIS DNA SPEC QL NAA+PROBE: SIGNIFICANT CHANGE UP
T VAGINALIS SPEC QL WET PREP: SIGNIFICANT CHANGE UP

## 2023-02-10 NOTE — PHYSICAL EXAM
[Chaperone Present] : A chaperone was present in the examining room during all aspects of the physical examination [Appropriately responsive] : appropriately responsive [Alert] : alert [No Acute Distress] : no acute distress [Soft] : soft [Non-tender] : non-tender [No Mass] : no mass [Oriented x3] : oriented x3 [Examination Of The Breasts] : a normal appearance [No Masses] : no breast masses were palpable [Labia Majora] : normal [Labia Minora] : normal [Discharge] : discharge [Heavy] : heavy [White] : white [Thick] : thick [Uterine Adnexae] : non-palpable [Normal] : normal [Foul Smelling] : not foul smelling [FreeTextEntry5] : thick, white discharge

## 2023-02-10 NOTE — HISTORY OF PRESENT ILLNESS
[FreeTextEntry1] : 47y P2 LMP 23 presents for annual wellness visit. Reports vaginal itching/burning with urination and a white, thick vaginal discharge. Feels like these symptoms occur every couple of months. She also has a hx of recurrent UTIs. Previously taking Macrobid post-coital, but ran out of pills. Prescribed Estrace in the past but never started it. Denies pelvic pain. Denies dysuria, hematuria. Currently sexually active with one male partner. Occasionally uses condoms. Her menses are regular and occur every 3 weeks, lasting for 3-4 days with light bleeding.\par \par OBHx:  x2\par GYNHx: Denies\par PMHx: Recurrent UTIs, Pyelonephritis \par PSHx: Denies\par Meds: None\par All: NKDA\par Social: Denies tobacco/drugs/alcohol; lives with boyfriend and feels safe at home\par FH: Mother  from stomach cancer, dx age 60\par \par HCM:\par - Pap (20): NIELM/HRHPV-\par - Mammo (): BI RADS 2\par - Colonoscopy: Never\par

## 2023-02-10 NOTE — PLAN
[FreeTextEntry1] : \par 47y P2 presenting for annual wellness visit with acute complaints of recurrent UTIs and vaginal itching/burning, found to have thick white discharge on pelvic exam\par \par #Recurrent UTIs\par #Vaginal Itching/Burning w/ Urination\par - Likely 2/2 perimenopause\par - Estrace Rx renewed, patient interested in starting\par - Macrobid post-coital Rx renewed\par \par #Abnormal discharge\par - Candida vs BV\par - Affirm sent, will f/u results and tx if positive\par \par #HCM\par - Pap/HPV: UTD, due 2025\par - Mammo: Referral given\par - [place]: Referral given\par \par RTC 3 mo\par D/w Dr. Ontiveros\par MALGORZATA Marinelli PGY1

## 2023-02-11 DIAGNOSIS — B37.31 ACUTE CANDIDIASIS OF VULVA AND VAGINA: ICD-10-CM

## 2023-02-11 DIAGNOSIS — N39.0 URINARY TRACT INFECTION, SITE NOT SPECIFIED: ICD-10-CM

## 2023-04-27 NOTE — ED POST DISCHARGE NOTE - DETAILS
[facility]  Adult Nutrition   Progress Note (Follow-Up)    SUMMARY      Recommendations:   1. Advance to modified diet as tolerated.   2. Continue Ensure HP pudding with meals and Landon BID as tolerated.  3. RD to monitor for intake, tolerance and advancement of po diet PRN.     Goals:   Goals: 1. Intake to be >/= 75% EEN. 2. Labs trend to target range. 3. No further skin issues.Ongoing    Dietitian Rounds Brief  Patient awaits unit PRBCs. Pt has not touched his lunch tray. Patient on a Dysphagia Mechanical Soft, Nectar thick assessment on initial and now has regular full liquid diet. RD to clarify. Last BM  via ostomy 4/27/23--functioning per MD note.  RD to monitor  .  Diet order: Full Liquid diet, Nectar thick.    Oral Supplement: Vanilla Ensure HP pudding    % Intake of Estimated Energy Needs: 50%  % Meal Intake: 50 - 75 %    Estimated/Assessed Needs  Weight Used For Calorie Calculations: 100 kg (220 lb 7.4 oz)  Energy Calorie Requirements (kcal): 4337-6343  Energy Need Method: Kcal/kg  Protein Requirements: 105-140 (1.5-2.0 / kg IBW; 1.0-1.4 CBW)  Weight Used For Protein Calculations: 70 kg (154 lb 5.2 oz) (IBW)  Fluid Requirements (mL): 2000 mL (20 mL/kg)  Estimated Fluid Requirement Method: RDA Method  RDA Method (mL): 2000       Weight History:  Wt Readings from Last 5 Encounters:   04/19/23 100.4 kg (221 lb 5.5 oz)   04/18/23 104.3 kg (230 lb)   04/03/23 104 kg (229 lb 4.5 oz)   03/26/23 112 kg (246 lb 14.6 oz)   03/23/23 111.1 kg (244 lb 14.9 oz)        Reason for Assessment  Reason For Assessment: consult  Diagnosis: infection/sepsis  Relevant Medical History: HTN, CAD s/p cardiac stents, lumbar DDD with chronic bilat LE weakness, HLD, gout, MI, Chronic SHF, arthritis, A-fib on Coumadin, DM II, dementia, and MTHFR mutation, and large non-healing sacral wound    Medications:Pertinent Medications Reviewed  Scheduled Meds:   allopurinoL  100 mg Oral QHS    amiodarone  200 mg Oral BID     atorvastatin  80 mg Oral Daily    calcitRIOL  0.5 mcg Oral Daily    cefTRIAXone (ROCEPHIN) IVPB  2 g Intravenous Q24H    collagenase   Topical (Top) Daily    famotidine  20 mg Oral Daily    ferrous sulfate  1 tablet Oral Daily    metronidazole  500 mg Intravenous Q8H    midodrine  5 mg Oral TID    multivitamin  1 tablet Oral Daily     Continuous Infusions:   sodium chloride 0.9% 100 mL/hr at 04/26/23 0850     PRN Meds:.sodium chloride, sodium chloride, sodium chloride, acetaminophen, calcium gluconate IVPB, calcium gluconate IVPB, calcium gluconate IVPB, dextrose 50%, dextrose 50%, glucagon (human recombinant), glucose, glucose, HYDROcodone-acetaminophen, insulin aspart U-100, LIDOcaine HCl 2%, magnesium sulfate IVPB, magnesium sulfate IVPB, melatonin, ondansetron, polyethylene glycol, potassium chloride **AND** potassium chloride **AND** potassium chloride, sodium phosphate IVPB, sodium phosphate IVPB, sodium phosphate IVPB    Labs: Pertinent Labs Reviewed  Clinical Chemistry:  Recent Labs   Lab 04/25/23  0444 04/26/23  1511 04/27/23  0359    141 143   K 3.7 3.3* 3.3*   * 116* 120*   CO2 18* 17* 18*   GLU 86 74 71   BUN 45* 39* 38*   CREATININE 1.8* 1.6* 1.7*   CALCIUM 9.1 8.5* 8.2*   PROT 5.4* 4.9* 4.3*   ALBUMIN 1.9* 2.0* 1.7*   BILITOT 0.7 0.9 0.7   ALKPHOS 254* 200* 178*   AST 54* 45* 38   ALT 84* 59* 45*   ANIONGAP 8 8 5*   MG 1.9 1.7 1.7   PHOS 3.8 3.6 3.5     CBC:   Recent Labs   Lab 04/27/23  0359   WBC 7.55   RBC 1.98*   HGB 6.6*   HCT 20.1*      *   MCH 33.3*   MCHC 32.8     Lipid Panel:  No results for input(s): CHOL, HDL, LDLCALC, TRIG, CHOLHDL in the last 168 hours.  Cardiac Profile:  No results for input(s): BNP, CPK, CPKMB, TROPONINI, CKTOTAL in the last 168 hours.  Inflammatory Labs:  Recent Labs   Lab 04/22/23  0325 04/26/23  1511   CRP 12.56* 7.79*     Diabetes:  No results for input(s): HGBA1C, POCTGLUCOSE in the last 168 hours.  Thyroid & Parathyroid:  No results for  input(s): TSH, FREET4, E4CSIKG, V1SFPIF, THYROIDAB in the last 168 hours.    Monitor and Evaluation  Food and Nutrient Intake: energy intake, food and beverage intake  Food and Nutrient Adminstration: diet order  Knowledge/Beliefs/Attitudes: food and nutrition knowledge/skill  Physical Activity and Function: nutrition-related ADLs and IADLs  Anthropometric Measurements: weight change, body mass index, weight  Biochemical Data, Medical Tests and Procedures: electrolyte and renal panel, gastrointestinal profile, glucose/endocrine profile, inflammatory profile, lipid profile  Nutrition-Focused Physical Findings: overall appearance     Nutrition Risk  Level of Risk/Frequency of Follow-up: high     Nutrition Follow-Up  RD Follow-up?: Yes    Carrie Butcher RD 04/27/2023 2:16 PM                2/28/22: Pt contacted, results explained. pt admits to urinary symptoms. reports vaginal itching/white discharge is improving, was given PO fluconazole. advised continuation of abx for symptomatic UTI, patient was given strict followup instructions and return precautions. pt verbalized understanding and was appreciative of call. - Jt Blair PA-C

## 2023-05-14 NOTE — PATIENT PROFILE ADULT - NSPROPATIENTLACTATING_GEN_A_NUR
no
FREE:[LAST:[follow up with your primary medical doctor],PHONE:[(   )    -],FAX:[(   )    -],FOLLOWUP:[4-6 Days]],PROVIDER:[TOKEN:[[id number]],FOLLOWUP:[4-6 Days]],PROVIDER:[TOKEN:[[id number]],FOLLOWUP:[4-6 Days]]

## 2023-06-11 ENCOUNTER — EMERGENCY (EMERGENCY)
Facility: HOSPITAL | Age: 48
LOS: 1 days | Discharge: HOME | End: 2023-06-11
Attending: STUDENT IN AN ORGANIZED HEALTH CARE EDUCATION/TRAINING PROGRAM
Payer: MEDICAID

## 2023-06-11 VITALS
DIASTOLIC BLOOD PRESSURE: 82 MMHG | HEART RATE: 85 BPM | SYSTOLIC BLOOD PRESSURE: 103 MMHG | TEMPERATURE: 98 F | RESPIRATION RATE: 16 BRPM | OXYGEN SATURATION: 100 %

## 2023-06-11 VITALS
OXYGEN SATURATION: 95 % | TEMPERATURE: 99 F | DIASTOLIC BLOOD PRESSURE: 60 MMHG | RESPIRATION RATE: 20 BRPM | HEART RATE: 94 BPM | WEIGHT: 123.02 LBS | HEIGHT: 60 IN | SYSTOLIC BLOOD PRESSURE: 108 MMHG

## 2023-06-11 LAB
ALBUMIN SERPL ELPH-MCNC: 4.2 G/DL — SIGNIFICANT CHANGE UP (ref 3.3–5)
ALP SERPL-CCNC: 57 U/L — SIGNIFICANT CHANGE UP (ref 40–120)
ALT FLD-CCNC: 9 U/L — LOW (ref 10–45)
ANION GAP SERPL CALC-SCNC: 16 MMOL/L — SIGNIFICANT CHANGE UP (ref 5–17)
APPEARANCE UR: CLEAR — SIGNIFICANT CHANGE UP
AST SERPL-CCNC: 28 U/L — SIGNIFICANT CHANGE UP (ref 10–40)
BACTERIA # UR AUTO: NEGATIVE — SIGNIFICANT CHANGE UP
BASOPHILS # BLD AUTO: 0.05 K/UL — SIGNIFICANT CHANGE UP (ref 0–0.2)
BASOPHILS NFR BLD AUTO: 0.8 % — SIGNIFICANT CHANGE UP (ref 0–2)
BILIRUB SERPL-MCNC: 0.7 MG/DL — SIGNIFICANT CHANGE UP (ref 0.2–1.2)
BILIRUB UR-MCNC: NEGATIVE — SIGNIFICANT CHANGE UP
BUN SERPL-MCNC: 10 MG/DL — SIGNIFICANT CHANGE UP (ref 7–23)
CALCIUM SERPL-MCNC: 8.9 MG/DL — SIGNIFICANT CHANGE UP (ref 8.4–10.5)
CHLORIDE SERPL-SCNC: 105 MMOL/L — SIGNIFICANT CHANGE UP (ref 96–108)
CO2 SERPL-SCNC: 18 MMOL/L — LOW (ref 22–31)
COLOR SPEC: ABNORMAL
CREAT SERPL-MCNC: 0.4 MG/DL — LOW (ref 0.5–1.3)
DIFF PNL FLD: NEGATIVE — SIGNIFICANT CHANGE UP
EGFR: 121 ML/MIN/1.73M2 — SIGNIFICANT CHANGE UP
EOSINOPHIL # BLD AUTO: 0.7 K/UL — HIGH (ref 0–0.5)
EOSINOPHIL NFR BLD AUTO: 11.4 % — HIGH (ref 0–6)
EPI CELLS # UR: 1 /HPF — SIGNIFICANT CHANGE UP
GLUCOSE SERPL-MCNC: 94 MG/DL — SIGNIFICANT CHANGE UP (ref 70–99)
GLUCOSE UR QL: NEGATIVE — SIGNIFICANT CHANGE UP
HCG SERPL-ACNC: <2 MIU/ML — SIGNIFICANT CHANGE UP
HCT VFR BLD CALC: 41 % — SIGNIFICANT CHANGE UP (ref 34.5–45)
HGB BLD-MCNC: 12.8 G/DL — SIGNIFICANT CHANGE UP (ref 11.5–15.5)
HIV 1 & 2 AB SERPL IA.RAPID: SIGNIFICANT CHANGE UP
HYALINE CASTS # UR AUTO: 0 /LPF — SIGNIFICANT CHANGE UP (ref 0–2)
IMM GRANULOCYTES NFR BLD AUTO: 0.2 % — SIGNIFICANT CHANGE UP (ref 0–0.9)
KETONES UR-MCNC: NEGATIVE — SIGNIFICANT CHANGE UP
LEUKOCYTE ESTERASE UR-ACNC: NEGATIVE — SIGNIFICANT CHANGE UP
LIDOCAIN IGE QN: 24 U/L — SIGNIFICANT CHANGE UP (ref 7–60)
LYMPHOCYTES # BLD AUTO: 1.99 K/UL — SIGNIFICANT CHANGE UP (ref 1–3.3)
LYMPHOCYTES # BLD AUTO: 32.5 % — SIGNIFICANT CHANGE UP (ref 13–44)
MCHC RBC-ENTMCNC: 26.4 PG — LOW (ref 27–34)
MCHC RBC-ENTMCNC: 31.2 GM/DL — LOW (ref 32–36)
MCV RBC AUTO: 84.7 FL — SIGNIFICANT CHANGE UP (ref 80–100)
MONOCYTES # BLD AUTO: 0.5 K/UL — SIGNIFICANT CHANGE UP (ref 0–0.9)
MONOCYTES NFR BLD AUTO: 8.2 % — SIGNIFICANT CHANGE UP (ref 2–14)
NEUTROPHILS # BLD AUTO: 2.88 K/UL — SIGNIFICANT CHANGE UP (ref 1.8–7.4)
NEUTROPHILS NFR BLD AUTO: 46.9 % — SIGNIFICANT CHANGE UP (ref 43–77)
NITRITE UR-MCNC: POSITIVE
NRBC # BLD: 0 /100 WBCS — SIGNIFICANT CHANGE UP (ref 0–0)
PH UR: 6 — SIGNIFICANT CHANGE UP (ref 5–8)
PLATELET # BLD AUTO: 289 K/UL — SIGNIFICANT CHANGE UP (ref 150–400)
POTASSIUM SERPL-MCNC: 5.4 MMOL/L — HIGH (ref 3.5–5.3)
POTASSIUM SERPL-SCNC: 5.4 MMOL/L — HIGH (ref 3.5–5.3)
PROT SERPL-MCNC: 7.4 G/DL — SIGNIFICANT CHANGE UP (ref 6–8.3)
PROT UR-MCNC: NEGATIVE — SIGNIFICANT CHANGE UP
RBC # BLD: 4.84 M/UL — SIGNIFICANT CHANGE UP (ref 3.8–5.2)
RBC # FLD: 15.7 % — HIGH (ref 10.3–14.5)
RBC CASTS # UR COMP ASSIST: 11 /HPF — HIGH (ref 0–4)
SODIUM SERPL-SCNC: 139 MMOL/L — SIGNIFICANT CHANGE UP (ref 135–145)
SP GR SPEC: 1.02 — SIGNIFICANT CHANGE UP (ref 1.01–1.02)
UROBILINOGEN FLD QL: NEGATIVE — SIGNIFICANT CHANGE UP
WBC # BLD: 6.13 K/UL — SIGNIFICANT CHANGE UP (ref 3.8–10.5)
WBC # FLD AUTO: 6.13 K/UL — SIGNIFICANT CHANGE UP (ref 3.8–10.5)
WBC UR QL: 1 /HPF — SIGNIFICANT CHANGE UP (ref 0–5)

## 2023-06-11 PROCEDURE — 84702 CHORIONIC GONADOTROPIN TEST: CPT

## 2023-06-11 PROCEDURE — 87591 N.GONORRHOEAE DNA AMP PROB: CPT

## 2023-06-11 PROCEDURE — 85025 COMPLETE CBC W/AUTO DIFF WBC: CPT

## 2023-06-11 PROCEDURE — 87491 CHLMYD TRACH DNA AMP PROBE: CPT

## 2023-06-11 PROCEDURE — 96374 THER/PROPH/DIAG INJ IV PUSH: CPT

## 2023-06-11 PROCEDURE — 86703 HIV-1/HIV-2 1 RESULT ANTBDY: CPT

## 2023-06-11 PROCEDURE — 99053 MED SERV 10PM-8AM 24 HR FAC: CPT

## 2023-06-11 PROCEDURE — 96375 TX/PRO/DX INJ NEW DRUG ADDON: CPT

## 2023-06-11 PROCEDURE — 36415 COLL VENOUS BLD VENIPUNCTURE: CPT

## 2023-06-11 PROCEDURE — 83690 ASSAY OF LIPASE: CPT

## 2023-06-11 PROCEDURE — 99284 EMERGENCY DEPT VISIT MOD MDM: CPT | Mod: 25

## 2023-06-11 PROCEDURE — 99284 EMERGENCY DEPT VISIT MOD MDM: CPT

## 2023-06-11 PROCEDURE — 80053 COMPREHEN METABOLIC PANEL: CPT

## 2023-06-11 PROCEDURE — 81001 URINALYSIS AUTO W/SCOPE: CPT

## 2023-06-11 PROCEDURE — 87086 URINE CULTURE/COLONY COUNT: CPT

## 2023-06-11 RX ORDER — CEFDINIR 250 MG/5ML
1 POWDER, FOR SUSPENSION ORAL
Qty: 14 | Refills: 0
Start: 2023-06-11 | End: 2023-06-17

## 2023-06-11 RX ORDER — CEFTRIAXONE 500 MG/1
1000 INJECTION, POWDER, FOR SOLUTION INTRAMUSCULAR; INTRAVENOUS ONCE
Refills: 0 | Status: COMPLETED | OUTPATIENT
Start: 2023-06-11 | End: 2023-06-11

## 2023-06-11 RX ORDER — SODIUM CHLORIDE 9 MG/ML
1000 INJECTION INTRAMUSCULAR; INTRAVENOUS; SUBCUTANEOUS ONCE
Refills: 0 | Status: COMPLETED | OUTPATIENT
Start: 2023-06-11 | End: 2023-06-11

## 2023-06-11 RX ORDER — FLUCONAZOLE 150 MG/1
150 TABLET ORAL ONCE
Refills: 0 | Status: COMPLETED | OUTPATIENT
Start: 2023-06-11 | End: 2023-06-11

## 2023-06-11 RX ORDER — ACETAMINOPHEN 500 MG
1000 TABLET ORAL ONCE
Refills: 0 | Status: COMPLETED | OUTPATIENT
Start: 2023-06-11 | End: 2023-06-11

## 2023-06-11 RX ADMIN — FLUCONAZOLE 150 MILLIGRAM(S): 150 TABLET ORAL at 09:57

## 2023-06-11 RX ADMIN — SODIUM CHLORIDE 1000 MILLILITER(S): 9 INJECTION INTRAMUSCULAR; INTRAVENOUS; SUBCUTANEOUS at 09:28

## 2023-06-11 RX ADMIN — CEFTRIAXONE 100 MILLIGRAM(S): 500 INJECTION, POWDER, FOR SOLUTION INTRAMUSCULAR; INTRAVENOUS at 09:57

## 2023-06-11 RX ADMIN — Medication 400 MILLIGRAM(S): at 09:28

## 2023-06-11 NOTE — ED PROVIDER NOTE - NSICDXPASTSURGICALHX_GEN_ALL_CORE_FT
[FreeTextEntry1] : pt will release rec Betzaidao, US\par intermittent abd pain- pt offered GI appt for this wk\par brochiectasis, asthma, depression- Stable. Continue establish treatment plan.\par 
PAST SURGICAL HISTORY:  No significant past surgical history

## 2023-06-11 NOTE — ED PROVIDER NOTE - OBJECTIVE STATEMENT
Spontaneous, unlabored and symmetrical This is a 47 year old female with no significant pmh presenting with dysuria and vaginal itching for about a week associated with lower abdominal pain, bilateral flank pain. Denies any fever/chills. No nausea or vomiting, diarrhea, vaginal bleeding. Endorses vaginal discharge that is white in color. Has 1 sexual partner, does not use condoms. Has treated herself with doxycycline that she had left over from previous UTI.

## 2023-06-11 NOTE — ED PROVIDER NOTE - NSFOLLOWUPINSTRUCTIONS_ED_ALL_ED_FT
No signs of emergency medical condition on today's workup.  Presumptive diagnosis made, but further evaluation may be required by your primary care doctor or specialist for a definitive diagnosis.  Therefore, follow up as directed and if symptoms change/worsen or any emergency conditions, please return to the ER.    YOU WERE SEEN FOR vaginal itching, urinary symptoms, and abdominal pain    YOU HAD labs, urine test done.   We have treated you with suspected complicated cystitis (blader infection)  Please follow up with a primary care doctor.   You can take ibuprofen 400mg every 6 to 8 hours and Tylenol 1000mg every 6 to 8 hours as needed for pain.   Please take Cefdinir 300mg twice daily for 7 days.   Please take Difulcan 150mg for suspected candida vulvovaginitis in 3 days, just one tablet, if you are still symptomatic.   We have sent over the prescriptions for these medications.     FOLLOW UP WITH YOUR PRIMARY CARE PROVIDER    RETURN TO THE EMERGENCY DEPARTMENT FOR worsening abdominal pain, fever, vomiting, or any new/concerning symptoms.

## 2023-06-11 NOTE — ED PROVIDER NOTE - CLINICAL SUMMARY MEDICAL DECISION MAKING FREE TEXT BOX
Attending (Kristian Hart D.O.):  47F hx of pyelo in past here for dysuria, white vaginal discharge and low back pain w/o nausea, vomiting, fever, chills. States feels like prior urine infection. Sexually active with 1 partner, no anal sex. Urinates before and after intercourse. No prior sti hx. Hemodynam stable, NAD. + suprapubic ttp, no peritoneal signs, no cva ttp. Appears well hydrated. No bruising. Suspect likely complicated cystitis. Does not sound like pyelo at this point. Do not think adnexal pathology based on hx. Will text for gc/chlamydia. Obtain UA though patient taking elft over doxycycline and pyridium so maybe a negative result. Will tx empirically. Do not think perinephric absc or need for imaging at his point. Patient states if everything on labs look okay, she would feel comfortable and prefers to go home. All discussed.

## 2023-06-11 NOTE — ED PROVIDER NOTE - PROGRESS NOTE DETAILS
Attending (Kristian Hart D.O.):  Patient with elevated eo. Maybe vulvovaginal candidisis given sxs and discahrge. Ua with expected findings. Plan for dc with abxs and fluconazole 2nd dose. All discussed with patient, agrees with plan.

## 2023-06-11 NOTE — ED PROVIDER NOTE - ATTENDING CONTRIBUTION TO CARE
Attending (Kristian Hart D.O.):  I have personally seen and examined this patient. I have performed a substantive portion of the visit including all aspects of the medical decision making. Resident, fellow, student, and/or ACP note reviewed. I agree on the plan of care except where noted.    see mdm

## 2023-06-11 NOTE — ED ADULT NURSE NOTE - NSFALLUNIVINTERV_ED_ALL_ED
Bed/Stretcher in lowest position, wheels locked, appropriate side rails in place/Call bell, personal items and telephone in reach/Instruct patient to call for assistance before getting out of bed/chair/stretcher/Non-slip footwear applied when patient is off stretcher/[place] to call system/Physically safe environment - no spills, clutter or unnecessary equipment/Purposeful proactive rounding/Room/bathroom lighting operational, light cord in reach

## 2023-06-11 NOTE — ED ADULT NURSE NOTE - HOW OFTEN DO YOU HAVE A DRINK CONTAINING ALCOHOL?
Panel Management Review      Patient has the following on his problem list:     Hypertension   Last three blood pressure readings:  BP Readings from Last 3 Encounters:   04/07/17 138/80   01/24/17 140/90   01/23/17 150/70     Blood pressure: MONITOR    HTN Guidelines:  Age 18-59 BP range:  Less than 140/90  Age 60-85 with Diabetes:  Less than 140/90  Age 60-85 without Diabetes:  less than 150/90      Composite cancer screening  Chart review shows that this patient is due/due soon for the following Colonoscopy  Summary:    Patient is due/failing the following:   COLONOSCOPY    Action needed:   colonoscopy    Type of outreach:    at patient's last office visit he states that he needs transportation to and from GI appointment and will let us know    Questions for provider review:    None                                                                                                                                    Henrique APPLE       Chart routed to none .          
Never

## 2023-06-11 NOTE — ED PROVIDER NOTE - PHYSICAL EXAMINATION
General: NAD  HEENT: NCAT.   Cardiac: RRR, 2+ radial pulses  Chest: CTA  Abdomen: soft, non-distended, + ttp lower abdomen, mostly suprapubic, no rebound or guarding. + bilateral CVA tenderness  Extremities: no peripheral edema, calf tenderness, or leg size discrepancies  Skin: no rashes  Neuro: AAOx3, motor and sensory grossly intact.  Psych: mood and affect appropriate

## 2023-06-11 NOTE — ED PROVIDER NOTE - NS ED ROS FT
GENERAL: No fever, no chills  	EYES: No change in vision  	HEENT: No trouble swallowing or speaking  	CARDIAC: No chest pain  	PULMONARY: No cough, no SOB  	GI: + abdominal pain, no nausea, no vomiting, no diarrhea, no constipation  	: + dysuria.  	SKIN: No rashes  	NEURO: No headache, no numbness  	MSK: No visible bony deformity   Otherwise as HPI or negative.

## 2023-06-11 NOTE — ED PROVIDER NOTE - CHIEF COMPLAINT
The patient is a 47y Female complaining of  The patient is a 47y Female complaining of dysuria, vag itching

## 2023-06-12 LAB
C TRACH RRNA SPEC QL NAA+PROBE: SIGNIFICANT CHANGE UP
CULTURE RESULTS: SIGNIFICANT CHANGE UP
N GONORRHOEA RRNA SPEC QL NAA+PROBE: SIGNIFICANT CHANGE UP
SPECIMEN SOURCE: SIGNIFICANT CHANGE UP

## 2023-06-14 RX ORDER — FLUCONAZOLE 150 MG/1
1 TABLET ORAL
Qty: 1 | Refills: 0
Start: 2023-06-14 | End: 2023-06-14

## 2023-07-06 ENCOUNTER — EMERGENCY (EMERGENCY)
Facility: HOSPITAL | Age: 48
LOS: 1 days | Discharge: HOME | End: 2023-07-06
Attending: PERSONAL EMERGENCY RESPONSE ATTENDANT
Payer: SELF-PAY

## 2023-07-06 ENCOUNTER — NON-APPOINTMENT (OUTPATIENT)
Age: 48
End: 2023-07-06

## 2023-07-06 VITALS
DIASTOLIC BLOOD PRESSURE: 71 MMHG | HEIGHT: 60 IN | RESPIRATION RATE: 19 BRPM | TEMPERATURE: 98 F | WEIGHT: 125 LBS | OXYGEN SATURATION: 100 % | HEART RATE: 91 BPM | SYSTOLIC BLOOD PRESSURE: 109 MMHG

## 2023-07-06 LAB
ALBUMIN SERPL ELPH-MCNC: 4.2 G/DL — SIGNIFICANT CHANGE UP (ref 3.3–5)
ALP SERPL-CCNC: 57 U/L — SIGNIFICANT CHANGE UP (ref 40–120)
ALT FLD-CCNC: 7 U/L — LOW (ref 10–45)
ANION GAP SERPL CALC-SCNC: 11 MMOL/L — SIGNIFICANT CHANGE UP (ref 5–17)
APPEARANCE UR: CLEAR — SIGNIFICANT CHANGE UP
AST SERPL-CCNC: 19 U/L — SIGNIFICANT CHANGE UP (ref 10–40)
BACTERIA # UR AUTO: NEGATIVE — SIGNIFICANT CHANGE UP
BASOPHILS # BLD AUTO: 0.04 K/UL — SIGNIFICANT CHANGE UP (ref 0–0.2)
BASOPHILS NFR BLD AUTO: 0.7 % — SIGNIFICANT CHANGE UP (ref 0–2)
BILIRUB SERPL-MCNC: 0.7 MG/DL — SIGNIFICANT CHANGE UP (ref 0.2–1.2)
BILIRUB UR-MCNC: NEGATIVE — SIGNIFICANT CHANGE UP
BUN SERPL-MCNC: 10 MG/DL — SIGNIFICANT CHANGE UP (ref 7–23)
CALCIUM SERPL-MCNC: 9.1 MG/DL — SIGNIFICANT CHANGE UP (ref 8.4–10.5)
CHLORIDE SERPL-SCNC: 102 MMOL/L — SIGNIFICANT CHANGE UP (ref 96–108)
CO2 SERPL-SCNC: 25 MMOL/L — SIGNIFICANT CHANGE UP (ref 22–31)
COLOR SPEC: ABNORMAL
CREAT SERPL-MCNC: 0.52 MG/DL — SIGNIFICANT CHANGE UP (ref 0.5–1.3)
DIFF PNL FLD: NEGATIVE — SIGNIFICANT CHANGE UP
EGFR: 113 ML/MIN/1.73M2 — SIGNIFICANT CHANGE UP
EOSINOPHIL # BLD AUTO: 0.46 K/UL — SIGNIFICANT CHANGE UP (ref 0–0.5)
EOSINOPHIL NFR BLD AUTO: 8 % — HIGH (ref 0–6)
EPI CELLS # UR: 1 /HPF — SIGNIFICANT CHANGE UP
GLUCOSE SERPL-MCNC: 91 MG/DL — SIGNIFICANT CHANGE UP (ref 70–99)
GLUCOSE UR QL: NEGATIVE — SIGNIFICANT CHANGE UP
HCT VFR BLD CALC: 38.9 % — SIGNIFICANT CHANGE UP (ref 34.5–45)
HGB BLD-MCNC: 12.4 G/DL — SIGNIFICANT CHANGE UP (ref 11.5–15.5)
HYALINE CASTS # UR AUTO: 1 /LPF — SIGNIFICANT CHANGE UP (ref 0–2)
IMM GRANULOCYTES NFR BLD AUTO: 0.3 % — SIGNIFICANT CHANGE UP (ref 0–0.9)
KETONES UR-MCNC: NEGATIVE — SIGNIFICANT CHANGE UP
LEUKOCYTE ESTERASE UR-ACNC: NEGATIVE — SIGNIFICANT CHANGE UP
LIDOCAIN IGE QN: 24 U/L — SIGNIFICANT CHANGE UP (ref 7–60)
LYMPHOCYTES # BLD AUTO: 1.87 K/UL — SIGNIFICANT CHANGE UP (ref 1–3.3)
LYMPHOCYTES # BLD AUTO: 32.6 % — SIGNIFICANT CHANGE UP (ref 13–44)
MCHC RBC-ENTMCNC: 26.7 PG — LOW (ref 27–34)
MCHC RBC-ENTMCNC: 31.9 GM/DL — LOW (ref 32–36)
MCV RBC AUTO: 83.7 FL — SIGNIFICANT CHANGE UP (ref 80–100)
MONOCYTES # BLD AUTO: 0.49 K/UL — SIGNIFICANT CHANGE UP (ref 0–0.9)
MONOCYTES NFR BLD AUTO: 8.5 % — SIGNIFICANT CHANGE UP (ref 2–14)
NEUTROPHILS # BLD AUTO: 2.86 K/UL — SIGNIFICANT CHANGE UP (ref 1.8–7.4)
NEUTROPHILS NFR BLD AUTO: 49.9 % — SIGNIFICANT CHANGE UP (ref 43–77)
NITRITE UR-MCNC: NEGATIVE — SIGNIFICANT CHANGE UP
NRBC # BLD: 0 /100 WBCS — SIGNIFICANT CHANGE UP (ref 0–0)
PH UR: 8 — SIGNIFICANT CHANGE UP (ref 5–8)
PLATELET # BLD AUTO: 297 K/UL — SIGNIFICANT CHANGE UP (ref 150–400)
POTASSIUM SERPL-MCNC: 4 MMOL/L — SIGNIFICANT CHANGE UP (ref 3.5–5.3)
POTASSIUM SERPL-SCNC: 4 MMOL/L — SIGNIFICANT CHANGE UP (ref 3.5–5.3)
PROT SERPL-MCNC: 7.1 G/DL — SIGNIFICANT CHANGE UP (ref 6–8.3)
PROT UR-MCNC: NEGATIVE — SIGNIFICANT CHANGE UP
RBC # BLD: 4.65 M/UL — SIGNIFICANT CHANGE UP (ref 3.8–5.2)
RBC # FLD: 14.8 % — HIGH (ref 10.3–14.5)
RBC CASTS # UR COMP ASSIST: 1 /HPF — SIGNIFICANT CHANGE UP (ref 0–4)
SODIUM SERPL-SCNC: 138 MMOL/L — SIGNIFICANT CHANGE UP (ref 135–145)
SP GR SPEC: 1.01 — SIGNIFICANT CHANGE UP (ref 1.01–1.02)
UROBILINOGEN FLD QL: NEGATIVE — SIGNIFICANT CHANGE UP
WBC # BLD: 5.74 K/UL — SIGNIFICANT CHANGE UP (ref 3.8–10.5)
WBC # FLD AUTO: 5.74 K/UL — SIGNIFICANT CHANGE UP (ref 3.8–10.5)
WBC UR QL: 0 /HPF — SIGNIFICANT CHANGE UP (ref 0–5)

## 2023-07-06 PROCEDURE — 93975 VASCULAR STUDY: CPT

## 2023-07-06 PROCEDURE — 93975 VASCULAR STUDY: CPT | Mod: 26

## 2023-07-06 PROCEDURE — 99053 MED SERV 10PM-8AM 24 HR FAC: CPT

## 2023-07-06 PROCEDURE — 74177 CT ABD & PELVIS W/CONTRAST: CPT | Mod: MA

## 2023-07-06 PROCEDURE — 96374 THER/PROPH/DIAG INJ IV PUSH: CPT | Mod: XU

## 2023-07-06 PROCEDURE — 76830 TRANSVAGINAL US NON-OB: CPT

## 2023-07-06 PROCEDURE — 85025 COMPLETE CBC W/AUTO DIFF WBC: CPT

## 2023-07-06 PROCEDURE — 81001 URINALYSIS AUTO W/SCOPE: CPT

## 2023-07-06 PROCEDURE — 83690 ASSAY OF LIPASE: CPT

## 2023-07-06 PROCEDURE — 74177 CT ABD & PELVIS W/CONTRAST: CPT | Mod: 26,MA

## 2023-07-06 PROCEDURE — 99223 1ST HOSP IP/OBS HIGH 75: CPT

## 2023-07-06 PROCEDURE — 36415 COLL VENOUS BLD VENIPUNCTURE: CPT

## 2023-07-06 PROCEDURE — 99284 EMERGENCY DEPT VISIT MOD MDM: CPT | Mod: 25

## 2023-07-06 PROCEDURE — 96375 TX/PRO/DX INJ NEW DRUG ADDON: CPT | Mod: XU

## 2023-07-06 PROCEDURE — G0378: CPT

## 2023-07-06 PROCEDURE — 87086 URINE CULTURE/COLONY COUNT: CPT

## 2023-07-06 PROCEDURE — 80053 COMPREHEN METABOLIC PANEL: CPT

## 2023-07-06 PROCEDURE — 76830 TRANSVAGINAL US NON-OB: CPT | Mod: 26

## 2023-07-06 RX ORDER — SODIUM CHLORIDE 9 MG/ML
1000 INJECTION INTRAMUSCULAR; INTRAVENOUS; SUBCUTANEOUS ONCE
Refills: 0 | Status: COMPLETED | OUTPATIENT
Start: 2023-07-06 | End: 2023-07-06

## 2023-07-06 RX ORDER — IBUPROFEN 200 MG
600 TABLET ORAL EVERY 8 HOURS
Refills: 0 | Status: DISCONTINUED | OUTPATIENT
Start: 2023-07-06 | End: 2023-07-09

## 2023-07-06 RX ORDER — ACETAMINOPHEN 500 MG
975 TABLET ORAL ONCE
Refills: 0 | Status: COMPLETED | OUTPATIENT
Start: 2023-07-06 | End: 2023-07-06

## 2023-07-06 RX ORDER — CEFTRIAXONE 500 MG/1
1000 INJECTION, POWDER, FOR SOLUTION INTRAMUSCULAR; INTRAVENOUS ONCE
Refills: 0 | Status: COMPLETED | OUTPATIENT
Start: 2023-07-06 | End: 2023-07-06

## 2023-07-06 RX ORDER — ACETAMINOPHEN 500 MG
1000 TABLET ORAL ONCE
Refills: 0 | Status: COMPLETED | OUTPATIENT
Start: 2023-07-06 | End: 2023-07-06

## 2023-07-06 RX ADMIN — SODIUM CHLORIDE 1000 MILLILITER(S): 9 INJECTION INTRAMUSCULAR; INTRAVENOUS; SUBCUTANEOUS at 09:17

## 2023-07-06 RX ADMIN — SODIUM CHLORIDE 100 MILLILITER(S): 9 INJECTION INTRAMUSCULAR; INTRAVENOUS; SUBCUTANEOUS at 21:25

## 2023-07-06 RX ADMIN — Medication 400 MILLIGRAM(S): at 13:10

## 2023-07-06 RX ADMIN — CEFTRIAXONE 100 MILLIGRAM(S): 500 INJECTION, POWDER, FOR SOLUTION INTRAMUSCULAR; INTRAVENOUS at 18:07

## 2023-07-06 NOTE — ED ADULT NURSE REASSESSMENT NOTE - NS ED NURSE REASSESS COMMENT FT1
Received report from BROOKE Zuniga. Pt AOx4 with stable VS. Comfort care and safety measures provided. Pt to go to CDU

## 2023-07-06 NOTE — ED CDU PROVIDER INITIAL DAY NOTE - PHYSICAL EXAMINATION
Gen: NAD, AOx3, non-toxic appearing, able to ambulate without assistance  Head: NCAT  HEENT: EOMI, PEERLA, normal conjunctiva, tongue midline, oral mucosa moist  Lung: CTAB, no respiratory distress, no wheezes/rhonchi/rales B/L, speaking in full sentences  CV: RRR, no murmurs, rubs or gallops  Abd: Diffuse abd tenderness,  no guarding, no rigidity, no rebound tenderness, + R CVA tenderness   MSK: no visible deformities, ROM normal in UE/LE, no back pain  Neuro: No focal sensory or motor deficits  Skin: Warm, well perfused, no rash, no leg swelling  Psych: normal affect, calm

## 2023-07-06 NOTE — ED ADULT NURSE NOTE - NSFALLUNIVINTERV_ED_ALL_ED
Bed/Stretcher in lowest position, wheels locked, appropriate side rails in place/Call bell, personal items and telephone in reach/Instruct patient to call for assistance before getting out of bed/chair/stretcher/Non-slip footwear applied when patient is off stretcher/[place] to call system/Physically safe environment - no spills, clutter or unnecessary equipment/Purposeful proactive rounding/Room/bathroom lighting operational, light cord in reach

## 2023-07-06 NOTE — ED CDU PROVIDER INITIAL DAY NOTE - NS ED ATTENDING STATEMENT MOD
This was a shared visit with the KIZZY. I reviewed and verified the documentation and independently performed the documented:

## 2023-07-06 NOTE — ED ADULT NURSE NOTE - CAS ELECT INFOMATION PROVIDED
From: Jaxon Hernandez  To: Duane Taylor MD  Sent: 2/13/2017 2:18 PM CST  Subject: Blood Work    Hello Dr. Taylor,    I'll be having blood drawn on Wednesday for various tests, including yours in advance of my appointment with you on 2/22/17. As part of the [facility] weight loss program I had blood drawn on 1/19/2017, and one of the tests ordered was an A1C. The result was a reading of 6.4. I can provide you with a copy.    You have an order in for me for an A1C. Shall I still have this done, even though it was just measured on January 19th? Please let me know, and cancel the A1C order if it's not supposed to be done.     Thank you.    Benigno Hernandez  
Order updated, it is not needed.    Patient notified via [facility].    
Teaching provided by CHRISTOPHE Ordaz/WILVER holt

## 2023-07-06 NOTE — ED PROVIDER NOTE - NSICDXPASTMEDICALHX_GEN_ALL_CORE_FT
83 YO M PMH of chronic systolic heart failure with PPM and recent upgrade to BiV, valvular heart disease (s/p mechanical aortic valve replacement in 1999 and MV repair in 2006) on coumadin INR goal 2.5 to 3, CAD s/p CABG/Stent BMS x 2 to LAD chronic atrial fibrillation, gout, HLD, HTN, BPH, presenting with dyspnea on exertion. Patient states he started developing shortness of breath a couple of days ago worse with exertion (walking). He says that at baseline he is able to walk 2,000-3,000 steps daily without limitations. Sleeps with 1 pillow at night as well as 1 pillow under his legs. Reports recent dry cough but no chest pain. Also reports abdominal bloating recently. No fevers, chills, recent illness, or travel history. Reports good compliance with his medications. Recently saw his Cardiologist who switched Lasix to Torsemide. He says he has felt weak and tired overall since his PPM was upgraded to a BiV device in September. He denies any history of bleeding disorders or Liver disease but does report having darker than usual stools recently. Had a colonoscopy done years ago which was normal.        97.8 F, HR 75, BP 92/54, RR 20, SpO2 99% on 4L of NC. Was first on Bipap initially.    Given 80mg IVP Lasix x1.        Pt was brought to the floors and was evaluated by the heart failure team. He was continued with diuresis 40mg BID, but systolic blood pressures were lowering to 80s-90s, and the following day was initially decreased to 40mg qd. He was evaluated by GI for his melena and drop in hemoglobin from baseline to around 7-8. His hyponatremia on admission. However, it was apparent pt had new-onset cirrhosis, most likely congestive in etiology 2/2 constrictive CHF. Heart failure recommended continuing diuresis with lasix 40mg TID with hold parameters of systolic <85 given worsening transaminitis despite diuresis. CXR during hospitalization revealed loculated R pleural effusion. TTE showed EF 50-60. Ascites was also present, but due to pt's pancytopenia with platelets to ~20, no paracentesis was performed and he was continued on CTX for empiric SBP coverage. Endoscopy was deferred as well 2/2 thrombocytopenia. His INR remained >3 during hospitalization and warfarin was held. His hgb goal was made >8 and first unit of PRBC infused on 11/5. Pt became acutely SOB and coughed at end of transfusion which was alleviated with lasix 40mg IVP. Pt's transaminitis continued to worsen despite diuresis, and his regimen was planned to be increased to lasix 60mg TID. However, he received one dose of lasix 60mg and became acutely hypotensive to SBP 60s for which an RRT was called. He was given 250cc bolus and blood transfusion for repletion. During this rapid, discussions were had with family who verbalized they would not want pt to be on pressors to transferred to CCU/ICU setting and to focus on non-aggressive measures. Throughout this process pt had been becoming much more lethargic, sleeping most of the day, but remained oriented. Palliative was consulted, and GOC discussions were initiated. He finished another unit of PRBCs and 1U PLTs. overnight 11/7. Lasix was held following the RRT to prevent further drop in BP. After transfusion of total 2U PRBCs, his hgb continued to decrease (6.7 from 7.1). PLTs increased from 23 to 58. Rapid was called overnight 11/7 for another drop in SBP to low 70s for which he was given a 500cc bolus and blood transfusion was continued.  On 11/8, discussions with family led towards comfort care. He was still receiving blood/PLT transfusions since family wanted to have medical management until remainder of family arrives to see pt given his guarded prognosis. Symptoms management begun with dilaudid for pain and dyspnea. Pt was able to verbalize that he would not want chest compressions or intubation in the case of cardiac arrest/respiratory failure. 85 YO M PMH of chronic systolic heart failure with PPM and recent upgrade to BiV, valvular heart disease (s/p mechanical aortic valve replacement in 1999 and MV repair in 2006) on coumadin INR goal 2.5 to 3, CAD s/p CABG/Stent BMS x 2 to LAD chronic atrial fibrillation, gout, HLD, HTN, BPH, presenting with dyspnea on exertion. Patient states he started developing shortness of breath a couple of days ago worse with exertion (walking). He says that at baseline he is able to walk 2,000-3,000 steps daily without limitations. Sleeps with 1 pillow at night as well as 1 pillow under his legs. Reports recent dry cough but no chest pain. Also reports abdominal bloating recently. No fevers, chills, recent illness, or travel history. Reports good compliance with his medications. Recently saw his Cardiologist who switched Lasix to Torsemide. He says he has felt weak and tired overall since his PPM was upgraded to a BiV device in September. He denies any history of bleeding disorders or Liver disease but does report having darker than usual stools recently. Had a colonoscopy done years ago which was normal.        97.8 F, HR 75, BP 92/54, RR 20, SpO2 99% on 4L of NC. Was first on Bipap initially.    Given 80mg IVP Lasix x1.        Pt was brought to the floors and was evaluated by the heart failure team. He was continued with diuresis 40mg BID, but systolic blood pressures were lowering to 80s-90s, and the following day was initially decreased to 40mg qd. He was evaluated by GI for his melena and drop in hemoglobin from baseline to around 7-8. His hyponatremia on admission. However, it was apparent pt had new-onset cirrhosis, most likely congestive in etiology 2/2 constrictive CHF. Heart failure recommended continuing diuresis with lasix 40mg TID with hold parameters of systolic <85 given worsening transaminitis despite diuresis. CXR during hospitalization revealed loculated R pleural effusion. TTE showed EF 50-60. Ascites was also present, but due to pt's pancytopenia with platelets to ~20, no paracentesis was performed and he was continued on CTX for empiric SBP coverage. Endoscopy was deferred as well 2/2 thrombocytopenia. His INR remained >3 during hospitalization and warfarin was held. His hgb goal was made >8 and first unit of PRBC infused on 11/5. Pt became acutely SOB and coughed at end of transfusion which was alleviated with lasix 40mg IVP. Pt's transaminitis continued to worsen despite diuresis, and his regimen was planned to be increased to lasix 60mg TID. However, he received one dose of lasix 60mg and became acutely hypotensive to SBP 60s for which an RRT was called. He was given 250cc bolus and blood transfusion for repletion. During this rapid, discussions were had with family who verbalized they would not want pt to be on pressors to transferred to CCU/ICU setting and to focus on non-aggressive measures. 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 Pt's family declined further workup which would include a bone marrow bx. Throughout this process pt had been becoming much more lethargic, sleeping most of the day, but remained oriented. Palliative was consulted, and GOC discussions were initiated. He finished another unit of PRBCs and 1U PLTs. overnight 11/7. Lasix was held following the RRT to prevent further drop in BP. After transfusion of total 2U PRBCs, his hgb continued to decrease (6.7 from 7.1). PLTs increased from 23 to 58. Rapid was called overnight 11/7 for another drop in SBP to low 70s for which he was given a 500cc bolus and blood transfusion was continued.  On 11/8, discussions with family led towards comfort care. He was still receiving blood/PLT transfusions since family wanted to have medical management until remainder of family arrives to see pt given his guarded prognosis. Symptoms management begun with dilaudid for pain and dyspnea. Pt was able to verbalize that he would not want chest compressions or intubation in the case of cardiac arrest/respiratory failure. PAST MEDICAL HISTORY:  Pyelonephritis

## 2023-07-06 NOTE — ED ADULT NURSE REASSESSMENT NOTE - NS ED NURSE REASSESS COMMENT FT1
Report received from previous RN Jr. Pt at this time transferred to CDU 47 for Uti s/s. Pt A&Ox4, ambulatory and well appearing. Requesting food at this time. Report received from previous RN Jr. Pt at this time transferred to CDU 47 for Uti s/s. Pt A&Ox4, ambulatory and well appearing. Requesting food at this time.   Pt given sandwich and ginger ale.

## 2023-07-06 NOTE — ED ADULT NURSE NOTE - ED STAT RN HANDOFF DETAILS 2
Report endorsed to oncmargarita Jose RN. Safety checks completed this shift. Safety rounds completed hourly.  IV sites checked Q2+remains WDL. Medications administered as ordered with no signs/symptoms of adverse reactions. Fall & skin precautions in place. Any issues endorsed to oncmargarita RN for follow up.

## 2023-07-06 NOTE — ED PROVIDER NOTE - ATTENDING CONTRIBUTION TO CARE
Attending MD Paige:  I performed a history and physical exam of the patient and discussed their management with the resident. I reviewed the resident's note and agree with the documented findings and plan of care. My medical decision making and observations are found above.

## 2023-07-06 NOTE — ED PROVIDER NOTE - PHYSICAL EXAMINATION
Physical Exam:  Gen: NAD, AOx3, non-toxic appearing, able to ambulate without assistance  Head: NCAT  HEENT: EOMI, PEERLA, normal conjunctiva, tongue midline, oral mucosa moist  Lung: CTAB, no respiratory distress, no wheezes/rhonchi/rales B/L, speaking in full sentences  CV: RRR, no murmurs, rubs or gallops  Abd: Diffuse abd tenderness,  no guarding, no rigidity, no rebound tenderness, + R CVA tenderness   MSK: no visible deformities, ROM normal in UE/LE, no back pain  Neuro: No focal sensory or motor deficits  Skin: Warm, well perfused, no rash, no leg swelling  Psych: normal affect, calm

## 2023-07-06 NOTE — ED ADULT NURSE NOTE - OBJECTIVE STATEMENT
48 year old female presents to ED via walk in complaining of urinary symptoms. Patient denies PMH and daily medications. States that she had similar symptoms 2 weeks ago, was diagnosed with UTI and given antibiotics, but symptoms have returned 4 days ago. Complaining of dysuria and right flank pain at this time. Upon assessment A&O x4, ambulatory and well appearing. Abdomen soft, non tender, and non distended. IV placed, bed locked and lowered. Comfort and safety measures maintained.

## 2023-07-06 NOTE — ED PROVIDER NOTE - CLINICAL SUMMARY MEDICAL DECISION MAKING FREE TEXT BOX
Patient presents to the ED for urinary symptoms. Patient is hemodynamically stable and afebrile on presentation. Patient had recent diagnosis of UTI now presenting with flank pain. There is concern for possible pyelonephritis. Patient physical exam significant for R. flank tenderness. We will obtain cbc, cmp , UA/UC, ct and and pelvis with contrast for evaluation. Pending labs and imaging for further dispo. Patient presents to the ED for urinary symptoms. Patient is hemodynamically stable and afebrile on presentation. Patient had recent diagnosis of UTI now presenting with flank pain. There is concern for possible pyelonephritis. Patient physical exam significant for R. flank tenderness. We will obtain cbc, cmp , UA/UC, ct and and pelvis with contrast for evaluation. Pending labs and imaging for further dispo.    Attending MD Paige.  Agree with above.  Pt is a 49 yo fem presenting to ED with complaint of dysuria, R lower flank pain and hx of urosepsis from similar.  Pt afebrile, well appearing.  Mild R lumbar triangle TTP on exam.  Vague R mid abdominal TTP.  Planned imaging, urine, labs and likely TBA for failure of outpt abxs. Patient presents to the ED for urinary symptoms. Patient is hemodynamically stable and afebrile on presentation. Patient had recent diagnosis of UTI now presenting with flank pain. There is concern for possible pyelonephritis. Patient physical exam significant for R. flank tenderness. We will obtain cbc, cmp , UA/UC, ct and and pelvis with contrast for evaluation. Pending labs and imaging for further dispo.    Attending MD Paige.  Agree with above.  Pt is a 49 yo fem presenting to ED with complaint of dysuria, R lower flank pain and hx of urosepsis from similar.  Pt afebrile, well appearing.  Mild R lumbar triangle TTP on exam.  Vague R mid abdominal TTP.  Planned imaging, urine, labs and likely TBA for failure of outpt abxs..

## 2023-07-06 NOTE — ED PROVIDER NOTE - OBJECTIVE STATEMENT
Patient is a 48y female with no signifcant pmhx who presents to the ED with urinary symtpoms. Patient was seen in the ED 2 weeks ago for dysuria + vag discharge - patient was diagnosed with UTI and possible yeast infection. Patient given cefdinir and flucanazole. Patient states she has no vaginal discharge @ this time. However, patient is still complaining of dysuria. Patient also states she has started to have R. flank pain and abd pain for a few days. Denies any blood in her urine, constipation, fever, shortness of breath, nausea, vomiting.

## 2023-07-06 NOTE — ED ADULT NURSE REASSESSMENT NOTE - NS ED NURSE REASSESS COMMENT FT1
patient unsure if she would like to stay overnight. PA from CDU to come reassess patient. Bed locked and lowered. Comfort and safety measures maintained.

## 2023-07-06 NOTE — ED CDU PROVIDER INITIAL DAY NOTE - ATTENDING APP SHARED VISIT CONTRIBUTION OF CARE
Attending MD Paige.  Agree with above.  Pt is a 49 yo fem presenting to ED with complaint of dysuria, R lower flank pain and hx of urosepsis from similar.  Pt afebrile, well appearing.  Mild R lumbar triangle TTP on exam.  Vague R mid abdominal TTP.  Planned imaging, urine, labs and likely TBA for failure of outpt abxs.  Following non-actionable w/u, planned CDU for IV abxs overnight and transition to PO abxs for dispo home.

## 2023-07-07 VITALS
DIASTOLIC BLOOD PRESSURE: 67 MMHG | SYSTOLIC BLOOD PRESSURE: 93 MMHG | TEMPERATURE: 98 F | HEART RATE: 76 BPM | RESPIRATION RATE: 18 BRPM | OXYGEN SATURATION: 98 %

## 2023-07-07 LAB
CULTURE RESULTS: SIGNIFICANT CHANGE UP
SPECIMEN SOURCE: SIGNIFICANT CHANGE UP

## 2023-07-07 PROCEDURE — 99238 HOSP IP/OBS DSCHRG MGMT 30/<: CPT

## 2023-07-07 RX ORDER — CEFDINIR 250 MG/5ML
1 POWDER, FOR SUSPENSION ORAL
Qty: 14 | Refills: 0
Start: 2023-07-07 | End: 2023-07-13

## 2023-07-07 NOTE — ED CDU PROVIDER SUBSEQUENT DAY NOTE - HISTORY
No interval changes since initial CDU provider note. Pt feels well without complaint. NAD BP 93/64, HR 84bpm. Plan to continue hydration, antibiotics, and pain control as needed. - CHRISTOPHE Carrera

## 2023-07-07 NOTE — ED CDU PROVIDER SUBSEQUENT DAY NOTE - NS ED ROS FT
Constitutional: No fever or chills  Eyes: No visual changes, no eye pain   CV: No chest pain or lower extremity edema  Resp: No SOB no cough  GI: + abd pain. No vomiting. No diarrhea.  : + dysuria, No hematuria.   MSK: No musculoskeletal pain  Skin: No rash  Psych: No complaints   Neuro: No headache. No numbness or tingling. No weakness.  Endo: No known diabetes

## 2023-07-07 NOTE — ED CDU PROVIDER DISPOSITION NOTE - CLINICAL COURSE
Patient is a 48y female with no signifcant pmhx who presents to the ED with urinary symtpoms. Patient was seen in the ED 2 weeks ago for dysuria + vag discharge - patient was diagnosed with UTI and possible yeast infection. Patient given cefdinir and flucanazole. Patient states she has no vaginal discharge @ this time. However, patient is still complaining of dysuria. Patient also states she has started to have R. flank pain and abd pain for a few days. Denies any blood in her urine, constipation, fever, shortness of breath, nausea, vomiting.  ED course: CT and US negative for acute pathology. UA negative for leuks/ nitrites. Treated for suspected pyelonephritis with Ceftriaxone. Plan to continue antibiotics, hydration, and pain control in CDU. Patient is a 48y female with no signifcant pmhx who presents to the ED with urinary symtpoms. Patient was seen in the ED 2 weeks ago for dysuria + vag discharge - patient was diagnosed with UTI and possible yeast infection. Patient given cefdinir and flucanazole. Patient states she has no vaginal discharge @ this time. However, patient is still complaining of dysuria. Patient also states she has started to have R. flank pain and abd pain for a few days. Denies any blood in her urine, constipation, fever, shortness of breath, nausea, vomiting.  ED course: CT and US negative for acute pathology. UA negative for leuks/ nitrites. Treated for suspected pyelonephritis with Ceftriaxone. Plan to continue antibiotics, hydration, and pain control in CDU.  in cdu, pt did well, no fevers, abdominal pain resolved, pt feeling much better, pt to go home on oral abx and f/up outpatient

## 2023-07-07 NOTE — ED CDU PROVIDER DISPOSITION NOTE - PATIENT PORTAL LINK FT
You can access the FollowMyHealth Patient Portal offered by [facility] by registering at the following website: [email or web address]. By joining [facility]’s FollowMyHealth portal, you will also be able to view your health information using other applications (apps) compatible with our system.

## 2023-07-07 NOTE — ED ADULT NURSE REASSESSMENT NOTE - NS ED NURSE REASSESS COMMENT FT1
Pt received from BROOKE Salinas. Pt oriented to CDU & plan of care was discussed. Pt A&O x 4. Pt in CDU for IV fluids, pain control. Pt denies any fever, chills. V/S stable, pt afebrile,  IV in place, patent and free of signs of infiltration. Pt resting in bed. Safety & comfort measures maintained. Call bell in reach. Will continue to monitor.

## 2023-07-07 NOTE — ED CDU PROVIDER SUBSEQUENT DAY NOTE - PROGRESS NOTE DETAILS
CDU NOTE CHRISTOPHE Underwood: pt resting comfortably, feels well without complaint. no pain. no vaginal discharge. no urinary symptoms. NAD VSS. abdomen soft/NT/ND. mild ttp at R side lower back.   pt comfortable going home.   as per Dr. De León, ok to d/c home on cefdinir x 1 week, pt to call admin line in 2 days to check culture, if culture negative patient to stop abx to prevent yeast infection

## 2023-07-07 NOTE — ED CDU PROVIDER DISPOSITION NOTE - NSFOLLOWUPINSTRUCTIONS_ED_ALL_ED_FT
Hydrate.     **ANTIBIOTICS     Please take Tylenol 650mg and/or Motrin 600mg every 6 hours as needed for pain.     We recommend you follow up with your primary care provider within the next 2-3 days, please bring all of your results with you. You can also follow up with your OBGYN.     Please return to the Emergency Department with new, worsening, or concerning symptoms, such as:  -Severe/worsening abdominal pain, uncontrollable vomiting   -Shortness of breath or trouble breathing  -Pressure, pain, tightness in chest  -Facial drooping, arm weakness, or speech difficulty     *More detailed information regarding your visit and discharge can be found by reviewing this packet 1. Stay hydrated.   2. Continue Current Home Medications per routine. Please take Tylenol 650mg and/or Motrin 600mg every 6 hours as needed for pain, with food.   Take Cefdinir 1 tab 2x/day for 1 week. please call our admin line [phone] between 11a-3p in 2 days for urine culture results. if your culture is negative for infection you should stop the antibiotics to prevent yeast infection.   3. Follow up with your PCP in 1-2 days. Bring Printed Results. You can also follow up with your Ob/gyn within 1 week. Please follow up uterine fibroids and abnormal findings on pelvic ultrasound with your Ob/gyn.   4. Return if symptoms worsen, fever, weakness, inability to eat/drink, dizziness and all other concerns.      *More detailed information regarding your visit and discharge can be found by reviewing this packet    Abdominal Pain, Adult  Pain in the abdomen (abdominal pain) can be caused by many things. Often, abdominal pain is not serious and it gets better with no treatment or by being treated at home. However, sometimes abdominal pain is serious.    Your health care provider will ask questions about your medical history and do a physical exam to try to determine the cause of your abdominal pain.    Follow these instructions at home:  Medicines    Take over-the-counter and prescription medicines only as told by your health care provider.  Do not take a laxative unless told by your health care provider.  General instructions      Watch your condition for any changes.  Drink enough fluid to keep your urine pale yellow.  Keep all follow-up visits as told by your health care provider. This is important.  Contact a health care provider if:  Your abdominal pain changes or gets worse.  You are not hungry or you lose weight without trying.  You are constipated or have diarrhea for more than 2–3 days.  You have pain when you urinate or have a bowel movement.  Your abdominal pain wakes you up at night.  Your pain gets worse with meals, after eating, or with certain foods.  You are vomiting and cannot keep anything down.  You have a fever.  You have blood in your urine.  Get help right away if:  Your pain does not go away as soon as your health care provider told you to expect.  You cannot stop vomiting.  Your pain is only in areas of the abdomen, such as the right side or the left lower portion of the abdomen. Pain on the right side could be caused by appendicitis.  You have bloody or black stools, or stools that look like tar.  You have severe pain, cramping, or bloating in your abdomen.  You have signs of dehydration, such as:  Dark urine, very little urine, or no urine.  Cracked lips.  Dry mouth.  Sunken eyes.  Sleepiness.  Weakness.  You have trouble breathing or chest pain.  Summary  Often, abdominal pain is not serious and it gets better with no treatment or by being treated at home. However, sometimes abdominal pain is serious.  Watch your condition for any changes.  Take over-the-counter and prescription medicines only as told by your health care provider.  Contact a health care provider if your abdominal pain changes or gets worse.  Get help right away if you have severe pain, cramping, or bloating in your abdomen.  This information is not intended to replace advice given to you by your health care provider. Make sure you discuss any questions you have with your health care provider.    Document Revised: 02/05/2021 Document Reviewed: 04/27/2020    Pyelonephritis, Adult  Body outline showing the urinary system, with a close-up of a normal kidney and an infected kidney.  Pyelonephritis is an infection that occurs in the kidney. The kidneys are the organs that filter a person's blood and move waste out of the bloodstream and into the urine. Urine passes from the kidneys, through tubes called ureters, and into the bladder. There are two main types of pyelonephritis:  Infections that come on quickly without any warning (acute pyelonephritis).  Infections that last for a long period of time (chronic pyelonephritis).  In most cases, the infection clears up with treatment and does not cause further problems. More severe infections or chronic infections can sometimes spread to the bloodstream or lead to other problems with the kidneys.    What are the causes?  This condition is usually caused by:  Bacteria traveling from the bladder up to the kidney. This may occur after having a bladder infection (cystitis) or urinary tract infection (UTI).  Bladder infections caused from bacteria traveling from the bloodstream to the kidney.  What increases the risk?  This condition is more likely to develop in:  Pregnant women.  Older people.  People who have any of these conditions:  Diabetes.  Inflammation of the prostate gland (prostatitis), in males.  Kidney stones or bladder stones.  Other abnormalities of the kidney or ureter.  Cancer.  People who have a catheter placed in the bladder.  People who are sexually active.  Women who use spermicides.  People who have had a prior UTI.  What are the signs or symptoms?  Symptoms of this condition include:  Frequent urination.  Strong or persistent urge to urinate.  Burning or stinging when urinating.  Abdominal pain.  Back pain.  Pain in the side or flank area.  Fever or chills.  Blood in the urine, or dark urine.  Nausea or vomiting.  How is this diagnosed?  This condition may be diagnosed based on:  Your medical history and a physical exam.  Urine tests.  Blood tests.  You may also have imaging tests of the kidneys, such as an ultrasound or CT scan.    How is this treated?  Treatment for this condition may depend on the severity of the infection.  If the infection is mild and is found early, you may be treated with antibiotic medicines taken by mouth (orally). You will need to drink fluids to remain hydrated.  If the infection is more severe, you may need to stay in the hospital and receive antibiotics given directly into a vein through an IV. You may also need to receive fluids through an IV if you are not able to remain hydrated. After your hospital stay, you may need to take oral antibiotics for a period of time.  Other treatments may be required, depending on the cause of the infection.    Follow these instructions at home:  Medicines    Take your antibiotic medicine as told by your health care provider. Do not stop taking the antibiotic even if you start to feel better.  Take over-the-counter and prescription medicines only as told by your health care provider.  General instructions    Three cups showing dark yellow, yellow, and pale yellow urine.  Drink enough fluid to keep your urine pale yellow.  Avoid caffeine, tea, and carbonated beverages. They tend to irritate the bladder.  Urinate often. Avoid holding in urine for long periods of time.  Urinate before and after sex.  After a bowel movement, women should cleanse from front to back. Use each tissue only once.  Keep all follow-up visits as told by your health care provider. This is important.  Contact a health care provider if:  Your symptoms do not get better after 2 days of treatment.  Your symptoms get worse.  You have a fever.  Get help right away if you:  Are unable to take your antibiotics or fluids.  Have shaking chills.  Vomit.  Have severe flank or back pain.  Have extreme weakness or fainting.  Summary  Pyelonephritis is a urinary tract infection (UTI) that occurs in the kidney.  Treatment for this condition may depend on the severity of the infection.  Take your antibiotic medicine as told by your health care provider. Do not stop taking the antibiotic even if you start to feel better.  Drink enough fluid to keep your urine pale yellow.  Keep all follow-up visits as told by your health care provider. This is important.  This information is not intended to replace advice given to you by your health care provider. Make sure you discuss any questions you have with your health care provider.    Document Revised: 07/28/2022 Document Reviewed: 07/28/2022

## 2023-07-07 NOTE — ED CDU PROVIDER DISPOSITION NOTE - ATTENDING APP SHARED VISIT CONTRIBUTION OF CARE
CDU Attending Note -- Pt seen and examined at bedside.  Case discussed c CDU PA.  Pt comfortable, asymptomatic, and has good follow up.  At this time there is no further work-up or treatment required to necessitate further CDU monitoring or admission.  Strict return precautions provided to patient.  Will discharge.  --BMM.

## 2023-07-12 ENCOUNTER — OUTPATIENT (OUTPATIENT)
Dept: OUTPATIENT SERVICES | Facility: HOSPITAL | Age: 48
LOS: 1 days | End: 2023-07-12
Payer: SELF-PAY

## 2023-07-12 ENCOUNTER — APPOINTMENT (OUTPATIENT)
Dept: INTERNAL MEDICINE | Facility: CLINIC | Age: 48
End: 2023-07-12
Payer: COMMERCIAL

## 2023-07-12 VITALS
BODY MASS INDEX: 20.49 KG/M2 | SYSTOLIC BLOOD PRESSURE: 100 MMHG | HEIGHT: 65 IN | DIASTOLIC BLOOD PRESSURE: 70 MMHG | HEART RATE: 82 BPM | WEIGHT: 123 LBS | OXYGEN SATURATION: 97 %

## 2023-07-12 DIAGNOSIS — N39.0 URINARY TRACT INFECTION, SITE NOT SPECIFIED: ICD-10-CM

## 2023-07-12 DIAGNOSIS — I10 ESSENTIAL (PRIMARY) HYPERTENSION: ICD-10-CM

## 2023-07-12 LAB
BILIRUB UR QL STRIP: NORMAL
GLUCOSE UR-MCNC: 100
HCG UR QL: NORMAL EU/DL
HGB UR QL STRIP.AUTO: NORMAL
KETONES UR-MCNC: NORMAL
LEUKOCYTE ESTERASE UR QL STRIP: NORMAL
NITRITE UR QL STRIP: NORMAL
PH UR STRIP: 7.5
PROT UR STRIP-MCNC: 30
SP GR UR STRIP: 1.01

## 2023-07-12 PROCEDURE — G0463: CPT

## 2023-07-12 PROCEDURE — ZZZZZ: CPT | Mod: GC

## 2023-07-12 NOTE — END OF VISIT
[] : Resident [FreeTextEntry3] : pt w/recurrent UTI in spite of Macrobid ppx. referral and abx as above. [Time Spent: ___ minutes] : I have spent [unfilled] minutes of time on the encounter.

## 2023-07-12 NOTE — PHYSICAL EXAM
[Normal] : normal rate, regular rhythm, normal S1 and S2 and no murmur heard [Soft] : abdomen soft [Non Tender] : non-tender [Non-distended] : non-distended [No Masses] : no abdominal mass palpated [No CVA Tenderness] : no CVA  tenderness

## 2023-07-12 NOTE — ASSESSMENT
[FreeTextEntry1] : 48F w/ pmh frequent UTIs and pyelo in 08/2021, presenting for post-ED f/u after being in the [facility] CDU with UTI-like symptoms and flank pain. Likely patient had UTI and UA/UCx negative iso recent antibiotics use. Notably patient has had multiple episodes of UTI-like symptoms despite trials of post-coital macrobid and vaginal estrogen. Patient is currently without UTI symptoms\par \par \par #Recurrent UTIs\par -Finish antibiotics course given UA possibly sterile iso antibiotic use\par -Referral to Urogyn for urodynamics evaluation\par -post-coital Fosfomycin iso breakthrough infection with macrobid\par -Patient to followup for CPE on 7/26

## 2023-07-12 NOTE — HISTORY OF PRESENT ILLNESS
[FreeTextEntry1] : Post-ED followup [de-identified] : 48F w/ pmh frequent UTIs and pyelo in 08/2021, presenting for post-ED f/u after being in the [facility] CDU with UTI-like symptoms and flank pain, c/f pyelo. UA/UCx negative, but notably patient was given abx for UTI and yeast infx 2 weeks prior to presentation. CTAP showed no hydronephrosis, but did show fibroids, which patient was unaware of. Patient was given Cefdinir x1 week and discharged.\par Today patient states that burning pain and urinary frequency resolved after taking the antibiotics. Patient has had multiple previous episodes despite post-coital macrobid and a 2-month trial of vaginal estrogen (for possible danyel-menopausal symptoms). Patient otherwise feels fine and has no concerns.

## 2023-07-18 ENCOUNTER — APPOINTMENT (OUTPATIENT)
Dept: OBGYN | Facility: CLINIC | Age: 48
End: 2023-07-18
Payer: COMMERCIAL

## 2023-07-18 ENCOUNTER — OUTPATIENT (OUTPATIENT)
Dept: OUTPATIENT SERVICES | Facility: HOSPITAL | Age: 48
LOS: 1 days | End: 2023-07-18
Payer: SELF-PAY

## 2023-07-18 VITALS — BODY MASS INDEX: 20.8 KG/M2 | SYSTOLIC BLOOD PRESSURE: 118 MMHG | DIASTOLIC BLOOD PRESSURE: 76 MMHG | WEIGHT: 125 LBS

## 2023-07-18 DIAGNOSIS — N76.0 ACUTE VAGINITIS: ICD-10-CM

## 2023-07-18 PROCEDURE — 99212 OFFICE O/P EST SF 10 MIN: CPT | Mod: GC

## 2023-07-18 PROCEDURE — G0463: CPT

## 2023-07-20 DIAGNOSIS — D25.1 INTRAMURAL LEIOMYOMA OF UTERUS: ICD-10-CM

## 2023-07-20 DIAGNOSIS — N39.0 URINARY TRACT INFECTION, SITE NOT SPECIFIED: ICD-10-CM

## 2023-07-20 NOTE — HISTORY OF PRESENT ILLNESS
[FreeTextEntry1] : Patient is a 49 yo P2 who presents for follow up after ED on 7/6 due to UTI.\par \par Pt reports she was seen in ED with UTI sx. She received macrobid and took it for 3 days and stopped after urine cx was negative. Sx resolved after 4 days. In the ED, a small fibroid was noted on ultrasound. Denies abnl vag discharge, pelvic pressure/discomfort, and all other sx.\par \par Pt reports that she has chronic urinary tract infections and yeast infections. Denies current sx. Takes macrobid postcoitally. Sexually active with new partner for the past few months and always uses condoms. Only one partner and pt feels that her infections have improved since then. Denies use of toys. Uses Estrace vaginally once a week - applies most of vaginal ointment on outside of vagina and a little bit of it on the inside of vagina. \par \par GYN- last pap and HPV testing negative in 2020, denies hx of abnl results\par Mammo negative in 2022

## 2023-07-20 NOTE — PLAN
[FreeTextEntry1] : 49 yo P2 with resolved UTI and intramural uterine fibroid.\par \par #Fibroid\par -7/6 TVUS in ED showed lower segment intramural uterine fibroid measuring 1.3 x 0.9 x 1.0 cm. Reassured patient that this fibroid is small and not of concern at this time, especially given lack of sx. Will monitor at this time.\par \par #Chronic UTIs and Yeast Infections\par -UTI resolved.\par -Continue postcoital abx\par -Continue Estrace and refilled prescription today\par -Discussed safe sexual practices and hygiene\par \par D/W Dr. Ontiveros \par Bharat Ch PGY1

## 2023-07-22 ENCOUNTER — RESULT REVIEW (OUTPATIENT)
Age: 48
End: 2023-07-22

## 2023-07-22 ENCOUNTER — OUTPATIENT (OUTPATIENT)
Dept: OUTPATIENT SERVICES | Facility: HOSPITAL | Age: 48
LOS: 1 days | End: 2023-07-22
Payer: SELF-PAY

## 2023-07-22 ENCOUNTER — APPOINTMENT (OUTPATIENT)
Dept: MAMMOGRAPHY | Facility: IMAGING CENTER | Age: 48
End: 2023-07-22
Payer: SELF-PAY

## 2023-07-22 DIAGNOSIS — Z00.00 ENCOUNTER FOR GENERAL ADULT MEDICAL EXAMINATION WITHOUT ABNORMAL FINDINGS: ICD-10-CM

## 2023-07-22 PROCEDURE — 77063 BREAST TOMOSYNTHESIS BI: CPT

## 2023-07-22 PROCEDURE — 77063 BREAST TOMOSYNTHESIS BI: CPT | Mod: 26

## 2023-07-22 PROCEDURE — 77067 SCR MAMMO BI INCL CAD: CPT

## 2023-07-22 PROCEDURE — 77067 SCR MAMMO BI INCL CAD: CPT | Mod: 26

## 2023-08-14 ENCOUNTER — APPOINTMENT (OUTPATIENT)
Dept: INTERNAL MEDICINE | Facility: CLINIC | Age: 48
End: 2023-08-14

## 2023-08-22 NOTE — ED ADULT TRIAGE NOTE - WEIGHT METHOD
stated 61y/o female presents for preop eval for scheduled right glossectomy, floor of mouth resection, limited pharyngectomy right neck dissection, tracheostomy.  Pt states few months ago seen in urgent care for sinus infection and was referred to ENT for further eval on small whitish spot to right side of tongue.  Referred to Dr Davenport.  Biopsy done.  Preop dx malignant neoplasm of tongue unspecified.

## 2023-09-06 ENCOUNTER — LABORATORY RESULT (OUTPATIENT)
Age: 48
End: 2023-09-06

## 2023-09-06 ENCOUNTER — APPOINTMENT (OUTPATIENT)
Dept: INTERNAL MEDICINE | Facility: CLINIC | Age: 48
End: 2023-09-06
Payer: COMMERCIAL

## 2023-09-06 ENCOUNTER — OUTPATIENT (OUTPATIENT)
Dept: OUTPATIENT SERVICES | Facility: HOSPITAL | Age: 48
LOS: 1 days | End: 2023-09-06
Payer: SELF-PAY

## 2023-09-06 VITALS
WEIGHT: 126 LBS | HEART RATE: 85 BPM | BODY MASS INDEX: 20.99 KG/M2 | DIASTOLIC BLOOD PRESSURE: 72 MMHG | OXYGEN SATURATION: 97 % | SYSTOLIC BLOOD PRESSURE: 112 MMHG | HEIGHT: 65 IN

## 2023-09-06 DIAGNOSIS — I10 ESSENTIAL (PRIMARY) HYPERTENSION: ICD-10-CM

## 2023-09-06 DIAGNOSIS — N92.6 IRREGULAR MENSTRUATION, UNSPECIFIED: ICD-10-CM

## 2023-09-06 DIAGNOSIS — N39.0 URINARY TRACT INFECTION, SITE NOT SPECIFIED: ICD-10-CM

## 2023-09-06 PROCEDURE — 81001 URINALYSIS AUTO W/SCOPE: CPT

## 2023-09-06 PROCEDURE — 85027 COMPLETE CBC AUTOMATED: CPT

## 2023-09-06 PROCEDURE — 99213 OFFICE O/P EST LOW 20 MIN: CPT

## 2023-09-06 PROCEDURE — 36415 COLL VENOUS BLD VENIPUNCTURE: CPT

## 2023-09-06 PROCEDURE — 87389 HIV-1 AG W/HIV-1&-2 AB AG IA: CPT

## 2023-09-06 PROCEDURE — 84702 CHORIONIC GONADOTROPIN TEST: CPT

## 2023-09-06 PROCEDURE — 86780 TREPONEMA PALLIDUM: CPT

## 2023-09-06 PROCEDURE — 80053 COMPREHEN METABOLIC PANEL: CPT

## 2023-09-06 RX ORDER — NITROFURANTOIN (MONOHYDRATE/MACROCRYSTALS) 25; 75 MG/1; MG/1
100 CAPSULE ORAL
Qty: 30 | Refills: 2 | Status: DISCONTINUED | COMMUNITY
Start: 2023-02-07 | End: 2023-09-06

## 2023-09-06 RX ORDER — FOSFOMYCIN TROMETHAMINE 3 G/1
3 POWDER ORAL
Qty: 10 | Refills: 1 | Status: DISCONTINUED | COMMUNITY
Start: 2023-07-12 | End: 2023-09-06

## 2023-09-06 RX ORDER — ESTRADIOL 0.1 MG/G
0.1 CREAM VAGINAL
Qty: 1 | Refills: 0 | Status: DISCONTINUED | COMMUNITY
Start: 2023-02-07 | End: 2023-09-06

## 2023-09-06 RX ORDER — FLUCONAZOLE 150 MG/1
150 TABLET ORAL
Qty: 3 | Refills: 2 | Status: DISCONTINUED | COMMUNITY
Start: 2023-02-14 | End: 2023-09-06

## 2023-09-07 LAB
ALBUMIN SERPL ELPH-MCNC: 4.8 G/DL
ALP BLD-CCNC: 54 U/L
ALT SERPL-CCNC: 12 U/L
ANION GAP SERPL CALC-SCNC: 13 MMOL/L
APPEARANCE: ABNORMAL
AST SERPL-CCNC: 26 U/L
BILIRUB SERPL-MCNC: 0.8 MG/DL
BILIRUBIN URINE: NEGATIVE
BLOOD URINE: NEGATIVE
BUN SERPL-MCNC: 11 MG/DL
CALCIUM SERPL-MCNC: 9.4 MG/DL
CHLORIDE SERPL-SCNC: 104 MMOL/L
CHOLEST SERPL-MCNC: 225 MG/DL
CHOLEST/HDLC SERPL: 3.8 RATIO
CO2 SERPL-SCNC: 24 MMOL/L
COLOR: NORMAL
CREAT SERPL-MCNC: 0.51 MG/DL
EGFR: 114 ML/MIN/1.73M2
GLUCOSE QUALITATIVE U: NEGATIVE MG/DL
GLUCOSE SERPL-MCNC: 94 MG/DL
HCG SERPL-MCNC: <1 MIU/ML
HCT VFR BLD CALC: 42.2 %
HDLC SERPL-MCNC: 60 MG/DL
HGB BLD-MCNC: 12.8 G/DL
HIV1+2 AB SPEC QL IA.RAPID: NONREACTIVE
KETONES URINE: NEGATIVE MG/DL
LDLC SERPL CALC-MCNC: 146 MG/DL
LEUKOCYTE ESTERASE URINE: NEGATIVE
MCHC RBC-ENTMCNC: 26.2 PG
MCHC RBC-ENTMCNC: 30.3 GM/DL
MCV RBC AUTO: 86.3 FL
NITRITE URINE: NEGATIVE
NONHDLC SERPL-MCNC: 165 MG/DL
PH URINE: 7
PLATELET # BLD AUTO: 337 K/UL
POTASSIUM SERPL-SCNC: 4.3 MMOL/L
PROT SERPL-MCNC: 7.4 G/DL
PROTEIN URINE: NORMAL MG/DL
RBC # BLD: 4.89 M/UL
RBC # FLD: 15.5 %
SODIUM SERPL-SCNC: 141 MMOL/L
SPECIFIC GRAVITY URINE: 1.02
T PALLIDUM AB SER QL IA: NEGATIVE
TRIGL SERPL-MCNC: 109 MG/DL
UROBILINOGEN URINE: 0.2 MG/DL
WBC # FLD AUTO: 6.91 K/UL

## 2023-09-08 PROBLEM — N92.6 IRREGULAR MENSTRUATION: Status: ACTIVE | Noted: 2023-09-08

## 2023-09-08 NOTE — PLAN
[FreeTextEntry1] : #HCM Cervical Cancer Screening: Cotesting 2020 negative, due in 2025.  Breast Cancer Screening: July 2023, BIRADS 2. Prefers 1 year interval. Due July 2024 Colon Cancer Screening: Last in august 2021 TDAP: March 2021  #Frequent UTIs - Pt was enrolled with [facility]. Per discussion with our pharmacy resident, can do bactrim DS 1/2 tab post intercourse. She has failed nitrofurantoin in the past. Fosfomycin too expensive and not on [facility] formulary. Urine cultures in [facility] with e coli and klebsiella, both sensitive to bactrim - Counseled to see OBGYN for chlamydia and gonorrhea testing. Additionally, counseled to return to urology for follow up. - Pt prefers to check UA with urine culture even though asymptomatic.  #Change in menses - Likely perimenopausal. Counseled to talk to OBGYN about it when she sees them for STD testing. Will check serum pregnancy  Will check CBC, CMP, Lipid (fasting), HIV, RPR

## 2023-09-08 NOTE — END OF VISIT
[] : Resident [FreeTextEntry3] : post coital UTIs prophylaxis regimen re adjusted with counselling from pharm since orignal regimen prescribed by GYN not affordable [Time Spent: ___ minutes] : I have spent [unfilled] minutes of time on the encounter.

## 2023-09-08 NOTE — PHYSICAL EXAM
[No JVD] : no jugular venous distention [Supple] : supple [Thyroid Normal, No Nodules] : the thyroid was normal and there were no nodules present [No Carotid Bruits] : no carotid bruits [No Edema] : there was no peripheral edema [Soft] : abdomen soft [Non-distended] : non-distended [Normal Bowel Sounds] : normal bowel sounds [Normal] : no joint swelling and grossly normal strength and tone [No Rash] : no rash [Coordination Grossly Intact] : coordination grossly intact [Normal Gait] : normal gait [de-identified] : TTP in deep RUQ and LUQ

## 2023-09-08 NOTE — HISTORY OF PRESENT ILLNESS
[FreeTextEntry1] : 48F w/ pmh frequent UTIs here for CPE. [de-identified] : 48F w/ pmh frequent UTIs here for CPE.  #Recurrent UTIs - Was post-coital fosfomycin but stopped due to cost. - Intravaginal estradiol also stopped due to cost. - Saw Urology in may 2022 and was supposed to follow up in 6 months but has not done so.  - Last UTI  was a couple of months ago. She is asymptomatic today with no complaints.  #Irregular periods - LMP 1.5 months ago, with 2 month interval  - Denies menopausal symptoms  #Social - Lives alone - 2 adult kids, healthy - From [place]. Works as  - Cooks herself. Vegetables, chicken, protein - Gym 5 days a week. Weight bearing exercise.  - Social smoker but stopped 6 months ago. Used to smoke maybe once a week a few cigarettes. Stopped because she knew it was bad for her health and skin.  #Surgical - None  #Allergies - Seasonal  #Current Meds - Vitamin C - Omega 3 - Womens pro-biotics

## 2023-09-08 NOTE — REVIEW OF SYSTEMS
[Negative] : Neurological [Dysuria] : no dysuria [Frequency] : no frequency [Vaginal Discharge] : no vaginal discharge

## 2023-09-27 NOTE — ED ADULT NURSE NOTE - CAS TRG GEN SKIN COLOR
Normal for race Topical Steroids Counseling: I discussed with the patient that prolonged use of topical steroids can result in the increased appearance of superficial blood vessels (telangiectasias), lightening (hypopigmentation) and thinning of the skin (atrophy).  Patient understands to avoid using high potency steroids in skin folds, the groin or the face.  The patient verbalized understanding of the proper use and possible adverse effects of topical steroids.  All of the patient's questions and concerns were addressed.

## 2024-01-05 ENCOUNTER — RESULT REVIEW (OUTPATIENT)
Age: 49
End: 2024-01-05

## 2024-01-05 ENCOUNTER — APPOINTMENT (OUTPATIENT)
Dept: INTERNAL MEDICINE | Facility: CLINIC | Age: 49
End: 2024-01-05
Payer: COMMERCIAL

## 2024-01-05 VITALS
BODY MASS INDEX: 21.33 KG/M2 | HEIGHT: 65 IN | HEART RATE: 85 BPM | SYSTOLIC BLOOD PRESSURE: 116 MMHG | DIASTOLIC BLOOD PRESSURE: 70 MMHG | WEIGHT: 128 LBS | OXYGEN SATURATION: 95 %

## 2024-01-05 DIAGNOSIS — Z23 ENCOUNTER FOR IMMUNIZATION: ICD-10-CM

## 2024-01-05 PROCEDURE — 99213 OFFICE O/P EST LOW 20 MIN: CPT | Mod: GE

## 2024-01-05 NOTE — HEALTH RISK ASSESSMENT
[PHQ-2 Negative - No further assessment needed] : PHQ-2 Negative - No further assessment needed [[id number]] : 0
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
## 2024-01-05 NOTE — PHYSICAL EXAM
[No Acute Distress] : no acute distress [Well-Appearing] : well-appearing [Normal Sclera/Conjunctiva] : normal sclera/conjunctiva [EOMI] : extraocular movements intact [Normal Outer Ear/Nose] : the outer ears and nose were normal in appearance [Supple] : supple [No Respiratory Distress] : no respiratory distress  [No Accessory Muscle Use] : no accessory muscle use [Clear to Auscultation] : lungs were clear to auscultation bilaterally [Normal Rate] : normal rate  [Regular Rhythm] : with a regular rhythm [Normal S1, S2] : normal S1 and S2 [No Murmur] : no murmur heard [No Edema] : there was no peripheral edema [Soft] : abdomen soft [Non Tender] : non-tender [Non-distended] : non-distended [No Spinal Tenderness] : no spinal tenderness [Grossly Normal Strength/Tone] : grossly normal strength/tone [No Rash] : no rash [No Focal Deficits] : no focal deficits [Normal Gait] : normal gait [Normal Affect] : the affect was normal [Normal Insight/Judgement] : insight and judgment were intact [de-identified] : R forearm ecchymosis noted, mild edema RUE, stitches on RUE, no discharge, reduced ROM, 5/5  strength, sensation intact
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
## 2024-01-06 ENCOUNTER — APPOINTMENT (OUTPATIENT)
Dept: RADIOLOGY | Facility: IMAGING CENTER | Age: 49
End: 2024-01-06
Payer: SELF-PAY

## 2024-01-06 ENCOUNTER — OUTPATIENT (OUTPATIENT)
Dept: OUTPATIENT SERVICES | Facility: HOSPITAL | Age: 49
LOS: 1 days | End: 2024-01-06
Payer: SELF-PAY

## 2024-01-06 DIAGNOSIS — Z00.8 ENCOUNTER FOR OTHER GENERAL EXAMINATION: ICD-10-CM

## 2024-01-06 PROCEDURE — 73030 X-RAY EXAM OF SHOULDER: CPT

## 2024-01-06 PROCEDURE — 73060 X-RAY EXAM OF HUMERUS: CPT | Mod: 26,RT

## 2024-01-06 PROCEDURE — 73030 X-RAY EXAM OF SHOULDER: CPT | Mod: 26,RT

## 2024-01-06 PROCEDURE — 73060 X-RAY EXAM OF HUMERUS: CPT

## 2024-01-17 ENCOUNTER — OUTPATIENT (OUTPATIENT)
Dept: OUTPATIENT SERVICES | Facility: HOSPITAL | Age: 49
LOS: 1 days | End: 2024-01-17
Payer: SELF-PAY

## 2024-01-17 ENCOUNTER — APPOINTMENT (OUTPATIENT)
Dept: ORTHOPEDIC SURGERY | Facility: HOSPITAL | Age: 49
End: 2024-01-17

## 2024-01-17 VITALS
SYSTOLIC BLOOD PRESSURE: 106 MMHG | BODY MASS INDEX: 21.16 KG/M2 | TEMPERATURE: 98 F | DIASTOLIC BLOOD PRESSURE: 73 MMHG | HEIGHT: 65 IN | RESPIRATION RATE: 16 BRPM | HEART RATE: 102 BPM | WEIGHT: 127 LBS

## 2024-01-17 DIAGNOSIS — M79.9 SOFT TISSUE DISORDER, UNSPECIFIED: ICD-10-CM

## 2024-01-17 PROCEDURE — G0463: CPT

## 2024-01-17 RX ORDER — SULFAMETHOXAZOLE AND TRIMETHOPRIM 800; 160 MG/1; MG/1
800-160 TABLET ORAL AS DIRECTED
Qty: 30 | Refills: 2 | Status: DISCONTINUED | COMMUNITY
Start: 2023-09-06 | End: 2024-01-17

## 2024-01-18 DIAGNOSIS — S42.309A UNSPECIFIED FRACTURE OF SHAFT OF HUMERUS, UNSPECIFIED ARM, INITIAL ENCOUNTER FOR CLOSED FRACTURE: ICD-10-CM

## 2024-02-01 ENCOUNTER — APPOINTMENT (OUTPATIENT)
Dept: INTERNAL MEDICINE | Facility: CLINIC | Age: 49
End: 2024-02-01
Payer: COMMERCIAL

## 2024-02-01 ENCOUNTER — OUTPATIENT (OUTPATIENT)
Dept: OUTPATIENT SERVICES | Facility: HOSPITAL | Age: 49
LOS: 1 days | End: 2024-02-01
Payer: MEDICAID

## 2024-02-01 ENCOUNTER — MED ADMIN CHARGE (OUTPATIENT)
Age: 49
End: 2024-02-01

## 2024-02-01 VITALS
BODY MASS INDEX: 21.99 KG/M2 | WEIGHT: 132 LBS | HEART RATE: 80 BPM | SYSTOLIC BLOOD PRESSURE: 100 MMHG | DIASTOLIC BLOOD PRESSURE: 70 MMHG | OXYGEN SATURATION: 97 % | HEIGHT: 65 IN

## 2024-02-01 DIAGNOSIS — E55.9 VITAMIN D DEFICIENCY, UNSPECIFIED: ICD-10-CM

## 2024-02-01 DIAGNOSIS — I10 ESSENTIAL (PRIMARY) HYPERTENSION: ICD-10-CM

## 2024-02-01 PROCEDURE — 90471 IMMUNIZATION ADMIN: CPT

## 2024-02-01 PROCEDURE — 99213 OFFICE O/P EST LOW 20 MIN: CPT | Mod: GE

## 2024-02-01 PROCEDURE — 90715 TDAP VACCINE 7 YRS/> IM: CPT

## 2024-02-02 PROBLEM — E55.9 VITAMIN D DEFICIENCY: Status: ACTIVE | Noted: 2022-04-21

## 2024-02-02 NOTE — HEALTH RISK ASSESSMENT
[PHQ-2 Negative - No further assessment needed] : PHQ-2 Negative - No further assessment needed [[id number]] : 0
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
## 2024-02-02 NOTE — PHYSICAL EXAM
[No Acute Distress] : no acute distress [Well Nourished] : well nourished [Well Developed] : well developed [Well-Appearing] : well-appearing [Normal Sclera/Conjunctiva] : normal sclera/conjunctiva [PERRL] : pupils equal round and reactive to light [EOMI] : extraocular movements intact [Normal Outer Ear/Nose] : the outer ears and nose were normal in appearance [Normal Oropharynx] : the oropharynx was normal [No JVD] : no jugular venous distention [No Lymphadenopathy] : no lymphadenopathy [Supple] : supple [Thyroid Normal, No Nodules] : the thyroid was normal and there were no nodules present [No Respiratory Distress] : no respiratory distress  [No Accessory Muscle Use] : no accessory muscle use [Clear to Auscultation] : lungs were clear to auscultation bilaterally [Normal Rate] : normal rate  [Regular Rhythm] : with a regular rhythm [Normal S1, S2] : normal S1 and S2 [No Murmur] : no murmur heard [No Carotid Bruits] : no carotid bruits [No Abdominal Bruit] : a ~M bruit was not heard ~T in the abdomen [No Varicosities] : no varicosities [Pedal Pulses Present] : the pedal pulses are present [No Edema] : there was no peripheral edema [No Palpable Aorta] : no palpable aorta [No Extremity Clubbing/Cyanosis] : no extremity clubbing/cyanosis [Soft] : abdomen soft [Non Tender] : non-tender [Non-distended] : non-distended [No Masses] : no abdominal mass palpated [No HSM] : no HSM [Normal Bowel Sounds] : normal bowel sounds [Normal Posterior Cervical Nodes] : no posterior cervical lymphadenopathy [Normal Anterior Cervical Nodes] : no anterior cervical lymphadenopathy [No CVA Tenderness] : no CVA  tenderness [No Spinal Tenderness] : no spinal tenderness [No Joint Swelling] : no joint swelling [No Rash] : no rash [Coordination Grossly Intact] : coordination grossly intact [No Focal Deficits] : no focal deficits [Normal Gait] : normal gait [Normal Affect] : the affect was normal [Normal Insight/Judgement] : insight and judgment were intact [de-identified] : RUE shoulder no ttp around joint space, incision site well healed, ROM abd/adduction/flex/ext full and wo pain; R lateral distal incision site with some ttp and edema, incision site clean, healing well; Elbow and wrist with full ROM and strength 4-5/5, radial pulse brisk, sensation C5-T1 intact; Yergason's negative; edema resolved - equal vs LUE

## 2024-02-05 DIAGNOSIS — Z23 ENCOUNTER FOR IMMUNIZATION: ICD-10-CM

## 2024-02-21 ENCOUNTER — APPOINTMENT (OUTPATIENT)
Dept: ORTHOPEDIC SURGERY | Facility: HOSPITAL | Age: 49
End: 2024-02-21

## 2024-02-21 ENCOUNTER — OUTPATIENT (OUTPATIENT)
Dept: OUTPATIENT SERVICES | Facility: HOSPITAL | Age: 49
LOS: 1 days | End: 2024-02-21
Payer: MEDICAID

## 2024-02-21 ENCOUNTER — RESULT REVIEW (OUTPATIENT)
Age: 49
End: 2024-02-21

## 2024-02-21 VITALS
HEART RATE: 80 BPM | HEIGHT: 65 IN | WEIGHT: 130 LBS | BODY MASS INDEX: 21.66 KG/M2 | RESPIRATION RATE: 14 BRPM | DIASTOLIC BLOOD PRESSURE: 75 MMHG | TEMPERATURE: 97.1 F | SYSTOLIC BLOOD PRESSURE: 110 MMHG
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
## 2024-02-21 DIAGNOSIS — M79.609 PAIN IN UNSPECIFIED LIMB: ICD-10-CM

## 2024-02-21 DIAGNOSIS — S42.309A UNSPECIFIED FRACTURE OF SHAFT OF HUMERUS, UNSPECIFIED ARM, INITIAL ENCOUNTER FOR CLOSED FRACTURE: ICD-10-CM

## 2024-02-21 PROCEDURE — 73060 X-RAY EXAM OF HUMERUS: CPT | Mod: 26,RT

## 2024-02-21 PROCEDURE — 73060 X-RAY EXAM OF HUMERUS: CPT

## 2024-02-21 PROCEDURE — G0463: CPT

## 2024-02-22 DIAGNOSIS — S42.309A UNSPECIFIED FRACTURE OF SHAFT OF HUMERUS, UNSPECIFIED ARM, INITIAL ENCOUNTER FOR CLOSED FRACTURE: ICD-10-CM

## 2024-02-28 ENCOUNTER — NON-APPOINTMENT (OUTPATIENT)
Age: 49
End: 2024-02-28

## 2024-02-28 ENCOUNTER — APPOINTMENT (OUTPATIENT)
Age: 49
End: 2024-02-28

## 2024-02-29 ENCOUNTER — RESULT REVIEW (OUTPATIENT)
Age: 49
End: 2024-02-29

## 2024-02-29 ENCOUNTER — APPOINTMENT (OUTPATIENT)
Dept: OBGYN | Facility: CLINIC | Age: 49
End: 2024-02-29
Payer: COMMERCIAL

## 2024-02-29 ENCOUNTER — OUTPATIENT (OUTPATIENT)
Dept: OUTPATIENT SERVICES | Facility: HOSPITAL | Age: 49
LOS: 1 days | End: 2024-02-29
Payer: MEDICAID

## 2024-02-29 VITALS — DIASTOLIC BLOOD PRESSURE: 72 MMHG | WEIGHT: 129 LBS | SYSTOLIC BLOOD PRESSURE: 110 MMHG | BODY MASS INDEX: 21.47 KG/M2

## 2024-02-29 DIAGNOSIS — Z01.419 ENCOUNTER FOR GYNECOLOGICAL EXAMINATION (GENERAL) (ROUTINE) W/OUT ABNORMAL FINDINGS: ICD-10-CM

## 2024-02-29 DIAGNOSIS — R92.30 DENSE BREASTS, UNSPECIFIED: ICD-10-CM

## 2024-02-29 DIAGNOSIS — N76.0 ACUTE VAGINITIS: ICD-10-CM

## 2024-02-29 PROCEDURE — G0463: CPT

## 2024-02-29 PROCEDURE — 99214 OFFICE O/P EST MOD 30 MIN: CPT

## 2024-02-29 PROCEDURE — G0444 DEPRESSION SCREEN ANNUAL: CPT

## 2024-02-29 NOTE — HISTORY OF PRESENT ILLNESS
[FreeTextEntry1] : 49yo P2 (LMP 2/1/24) with h/o recurrent UTI/pyelo presents for annual gyn exam. Pt still with reg monthly menses. Denies menopausal sx.  Sex active.  Using estrace- needs refill.   Pt denies pain/ abnl bleeding/ change in bowel or bladder habits   - PAP 2020 - Mammo 7/2023 Birads 2 (dense) - PHQ depression screen: 0 - colonoscopy- [place] 2023 per pt  Gen health followed by medicine

## 2024-02-29 NOTE — DISCUSSION/SUMMARY
[FreeTextEntry1] : 47yo P2- annual gyn exam - []pap/ hpv - [ ]mammo br sono due 7/24 - colonoscopy up to date per pt 2023 ([facility]) - gen health followed by medicine - vag atrophy/ frequent UTI- [ ]estrace rx refilled - depression screening done, no signs of clinical depression. PHQ scores reviewed over 5-10 min face to face time.  F/u for changes in mood/anxiety.  - Menopausal sx and bleeding patterns reviewed at length  RTC for chrissy/jose alfredo Urbina ,PAC

## 2024-02-29 NOTE — PHYSICAL EXAM
[Alert] : alert [Appropriately responsive] : appropriately responsive [No Lymphadenopathy] : no lymphadenopathy [No Acute Distress] : no acute distress [Soft] : soft [Non-tender] : non-tender [Non-distended] : non-distended [No HSM] : No HSM [No Mass] : no mass [No Lesions] : no lesions [Oriented x3] : oriented x3 [Examination Of The Breasts] : a normal appearance [No Masses] : no breast masses were palpable [Vulvar Atrophy] : vulvar atrophy [Labia Majora] : normal [Labia Minora] : normal [Atrophy] : atrophy [No Bleeding] : There was no active vaginal bleeding [Normal] : normal [Normal Position] : in a normal position [Tenderness] : nontender [Enlarged ___ wks] : not enlarged [Uterine Adnexae] : normal

## 2024-03-01 ENCOUNTER — OUTPATIENT (OUTPATIENT)
Dept: OUTPATIENT SERVICES | Facility: HOSPITAL | Age: 49
LOS: 1 days | End: 2024-03-01
Payer: MEDICAID

## 2024-03-01 ENCOUNTER — APPOINTMENT (OUTPATIENT)
Dept: OBGYN | Facility: CLINIC | Age: 49
End: 2024-03-01
Payer: COMMERCIAL

## 2024-03-01 VITALS
BODY MASS INDEX: 25.32 KG/M2 | WEIGHT: 129 LBS | SYSTOLIC BLOOD PRESSURE: 104 MMHG | DIASTOLIC BLOOD PRESSURE: 71 MMHG | HEART RATE: 84 BPM | HEIGHT: 60 IN

## 2024-03-01 DIAGNOSIS — N36.8 OTHER SPECIFIED DISORDERS OF URETHRA: ICD-10-CM

## 2024-03-01 DIAGNOSIS — N39.0 URINARY TRACT INFECTION, SITE NOT SPECIFIED: ICD-10-CM

## 2024-03-01 DIAGNOSIS — R92.30 DENSE BREASTS, UNSPECIFIED: ICD-10-CM

## 2024-03-01 DIAGNOSIS — Z13.31 ENCOUNTER FOR SCREENING FOR DEPRESSION: ICD-10-CM

## 2024-03-01 DIAGNOSIS — Z01.419 ENCOUNTER FOR GYNECOLOGICAL EXAMINATION (GENERAL) (ROUTINE) WITHOUT ABNORMAL FINDINGS: ICD-10-CM

## 2024-03-01 LAB
BILIRUB UR QL STRIP: NORMAL
CLARITY UR: CLEAR
COLLECTION METHOD: NORMAL
GLUCOSE UR-MCNC: NORMAL
HCG UR QL: 0.2 EU/DL
HGB UR QL STRIP.AUTO: NORMAL
KETONES UR-MCNC: NORMAL
LEUKOCYTE ESTERASE UR QL STRIP: NORMAL
NITRITE UR QL STRIP: NORMAL
PH UR STRIP: 6
PROT UR STRIP-MCNC: NORMAL
SP GR UR STRIP: 1.02

## 2024-03-01 PROCEDURE — 99214 OFFICE O/P EST MOD 30 MIN: CPT | Mod: GC

## 2024-03-01 PROCEDURE — G0463: CPT

## 2024-03-01 RX ORDER — ESTRADIOL 0.1 MG/G
0.1 CREAM VAGINAL
Qty: 1 | Refills: 2 | Status: DISCONTINUED | COMMUNITY
Start: 2022-05-03 | End: 2024-03-01

## 2024-03-01 NOTE — DISCUSSION/SUMMARY
[FreeTextEntry1] : 49yo P2 LMP 1 mo ago presenting for history of recurrent UTI, on post-coital antibiotics. Patient has not had culture proven UTI since 2022. She does not currently meet criteria for recurrent UTI - Patient advised to discontinue post-coital antibiotics at this time given prolonged time without UTI. Discussed antibiotic stewardship.  - Vaginal estrogen not indicated at this time as patient is still menstruating. Counseled on utility of vaginal estrogen in postmenopausal setting to prevent against UTIs  - Patient counseled on prevention of UTI including hydration, perineal hygiene, and urinating after intercourse - Patient counseled on importance of obtaining a urine culture prior to treatment with antibiotics  Lisy Burns, PGY2 Patient seen and examined with Dr. Blair and Marbella Busby, Urogynecology Fellow   Note addended above as necessary. Agree with above, patient will follow up as needed. All questions answered and addressed.  ROSALINE Busby MD PGY7

## 2024-03-01 NOTE — REASON FOR VISIT
[Initial Visit ___] : an initial visit for [unfilled] [[employer] ] : provided by [employer]   [Time Spent: ____ minutes] : Total time spent using  services: [unfilled] minutes. The patient's primary language is not English thus required  services. [Recurrent Urinary Infections] : recurrent urinary infections [Interpreters_FullName] : Ivan [Interpreters_IDNumber] : [id number] [TWNoteComboBox1] : [place]

## 2024-03-01 NOTE — OB HISTORY
[Total Preg ___] : : [unfilled] [Vaginal ___] : [unfilled] vaginal delivery(s) [AB Induced ___] : [unfilled] elective (s) [Sexually Active] : sexually active

## 2024-03-01 NOTE — PROCEDURE
[FreeTextEntry1] :  A catheterized urine was performed to rule out urinary tract infection and/or retention.

## 2024-03-01 NOTE — HISTORY OF PRESENT ILLNESS
[Unable To Restrain Bowel Movement] : no [Urinary Frequency] : mild [Feelings Of Urinary Urgency] : no [Constipation Obstructed Defecation] : mild [Incomplete Emptying Of Stool] : no [FreeTextEntry6] : relieved by daily laxatives [FreeTextEntry1] : Kiera is 49yo P2 LMP 1 mo ago presenting with history of recurrent UTI. Patient has a history of pyelonephritis in 2021 with multiple subsequent UTI, outlined below. Patient reports that her last UTI was 10 months ago, treated with doxycycline. For prevention of UTI, patient has been taking post-coital antibiotics but is uncertain of the medication name. She was also prescribed vaginal estrogen but never started it. When she feels dysuria, she takes Azo which relieves her symptoms, last taken in December. Patient denies vaginal dryness, dyspareunia, abnormal vaginal discharge.   Positive UCx: 1/30/22 UCx >100,000 CFU E coli 9/7/21 - UCx >100,000 CFU Klebsiella, tx with cefpodoxime 8/16/21 - pyelonephritis with sepsis and bacteremia, UCx >100,000 CFU E coli, tx with CTX

## 2024-03-01 NOTE — PHYSICAL EXAM
[Chaperone Present] : A chaperone was present in the examining room during all aspects of the physical examination [No Acute Distress] : in no acute distress [Well developed] : well developed [Well Nourished] : ~L well nourished [Good Hygeine] : demonstrates good hygeine [Oriented x3] : oriented to person, place, and time [Soft, Nontender] : the abdomen was soft and nontender [No Mass] : no masses were palpated [No HSM] : no hepatosplenomegaly noted [Labia Majora] : were normal [Labia Minora] : were normal [Bartholin's Gland] : both Bartholin's glands were normal  [Atrophy] : atrophy [Normal] : normal

## 2024-03-04 DIAGNOSIS — Z80.3 FAMILY HISTORY OF MALIGNANT NEOPLASM OF BREAST: ICD-10-CM

## 2024-03-04 DIAGNOSIS — N39.0 URINARY TRACT INFECTION, SITE NOT SPECIFIED: ICD-10-CM

## 2024-03-04 DIAGNOSIS — Z82.5 FAMILY HISTORY OF ASTHMA AND OTHER CHRONIC LOWER RESPIRATORY DISEASES: ICD-10-CM

## 2024-03-06 ENCOUNTER — NON-APPOINTMENT (OUTPATIENT)
Age: 49
End: 2024-03-06

## 2024-03-12 ENCOUNTER — APPOINTMENT (OUTPATIENT)
Dept: ORTHOPEDIC SURGERY | Facility: CLINIC | Age: 49
End: 2024-03-12
Payer: MEDICAID

## 2024-03-12 VITALS — HEIGHT: 60 IN | WEIGHT: 128 LBS | BODY MASS INDEX: 25.13 KG/M2

## 2024-03-12 DIAGNOSIS — T84.84XA PAIN DUE TO INTERNAL ORTHOPEDIC PROSTHETIC DEVICES, IMPLANTS AND GRAFTS, INITIAL ENCOUNTER: ICD-10-CM

## 2024-03-12 PROCEDURE — 73060 X-RAY EXAM OF HUMERUS: CPT

## 2024-03-12 PROCEDURE — 99204 OFFICE O/P NEW MOD 45 MIN: CPT | Mod: 25

## 2024-03-12 NOTE — PHYSICAL EXAM
[de-identified] : Right upper extremity - Previous surgical incisions well-healed - Shoulder range of motion 120 degrees of forward flexion 110 degrees abduction pain at this motions - Prominent nail at the proximal aspect of the shoulder - Patient to make thumbs up okay sign cross second digit extend wrist and fingers and wrist Patient intact to light touch radial ulnar median and axillary nerve distribution - Hand warm well-perfused radial pulse palpable [de-identified] : Previous x-rays from [facility] of the right humerus are reviewed: My independent interpretation: Patient has a transverse humerus fracture with butterfly fragment.  There is gapping at the fracture site no evidence of bony callus or any healing.  The humerus has a intramedullary nail that is significantly prominent proximally about.  At least 1-1/2 cm.  New x-rays obtained today of the right humerus 2 views: These demonstrate again no healing at the fracture site, no callus
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
## 2024-03-13 ENCOUNTER — APPOINTMENT (OUTPATIENT)
Dept: ORTHOPEDIC SURGERY | Facility: CLINIC | Age: 49
End: 2024-03-13

## 2024-03-14 ENCOUNTER — OUTPATIENT (OUTPATIENT)
Dept: OUTPATIENT SERVICES | Facility: HOSPITAL | Age: 49
LOS: 1 days | End: 2024-03-14
Payer: MEDICAID

## 2024-03-14 VITALS
HEART RATE: 76 BPM | DIASTOLIC BLOOD PRESSURE: 72 MMHG | HEIGHT: 60 IN | RESPIRATION RATE: 16 BRPM | OXYGEN SATURATION: 99 % | SYSTOLIC BLOOD PRESSURE: 106 MMHG | WEIGHT: 128.09 LBS | TEMPERATURE: 98 F

## 2024-03-14 DIAGNOSIS — Z29.9 ENCOUNTER FOR PROPHYLACTIC MEASURES, UNSPECIFIED: ICD-10-CM

## 2024-03-14 DIAGNOSIS — S42.351G: ICD-10-CM

## 2024-03-14 DIAGNOSIS — Z01.818 ENCOUNTER FOR OTHER PREPROCEDURAL EXAMINATION: ICD-10-CM

## 2024-03-14 DIAGNOSIS — S42.309A UNSPECIFIED FRACTURE OF SHAFT OF HUMERUS, UNSPECIFIED ARM, INITIAL ENCOUNTER FOR CLOSED FRACTURE: Chronic | ICD-10-CM

## 2024-03-14 DIAGNOSIS — S42.301K UNSPECIFIED FRACTURE OF SHAFT OF HUMERUS, RIGHT ARM, SUBSEQUENT ENCOUNTER FOR FRACTURE WITH NONUNION: ICD-10-CM

## 2024-03-14 DIAGNOSIS — Z98.890 OTHER SPECIFIED POSTPROCEDURAL STATES: Chronic | ICD-10-CM

## 2024-03-14 LAB
ANION GAP SERPL CALC-SCNC: 10 MMOL/L — SIGNIFICANT CHANGE UP (ref 5–17)
BLD GP AB SCN SERPL QL: NEGATIVE — SIGNIFICANT CHANGE UP
BUN SERPL-MCNC: 14 MG/DL — SIGNIFICANT CHANGE UP (ref 7–23)
CALCIUM SERPL-MCNC: 9.1 MG/DL — SIGNIFICANT CHANGE UP (ref 8.4–10.5)
CHLORIDE SERPL-SCNC: 103 MMOL/L — SIGNIFICANT CHANGE UP (ref 96–108)
CO2 SERPL-SCNC: 25 MMOL/L — SIGNIFICANT CHANGE UP (ref 22–31)
CREAT SERPL-MCNC: 0.48 MG/DL — LOW (ref 0.5–1.3)
EGFR: 115 ML/MIN/1.73M2 — SIGNIFICANT CHANGE UP
GLUCOSE SERPL-MCNC: 87 MG/DL — SIGNIFICANT CHANGE UP (ref 70–99)
HCT VFR BLD CALC: 35.6 % — SIGNIFICANT CHANGE UP (ref 34.5–45)
HGB BLD-MCNC: 11.5 G/DL — SIGNIFICANT CHANGE UP (ref 11.5–15.5)
MCHC RBC-ENTMCNC: 25.6 PG — LOW (ref 27–34)
MCHC RBC-ENTMCNC: 32.3 GM/DL — SIGNIFICANT CHANGE UP (ref 32–36)
MCV RBC AUTO: 79.1 FL — LOW (ref 80–100)
NRBC # BLD: 0 /100 WBCS — SIGNIFICANT CHANGE UP (ref 0–0)
PLATELET # BLD AUTO: 283 K/UL — SIGNIFICANT CHANGE UP (ref 150–400)
POTASSIUM SERPL-MCNC: 4 MMOL/L — SIGNIFICANT CHANGE UP (ref 3.5–5.3)
POTASSIUM SERPL-SCNC: 4 MMOL/L — SIGNIFICANT CHANGE UP (ref 3.5–5.3)
RBC # BLD: 4.5 M/UL — SIGNIFICANT CHANGE UP (ref 3.8–5.2)
RBC # FLD: 15.4 % — HIGH (ref 10.3–14.5)
RH IG SCN BLD-IMP: POSITIVE — SIGNIFICANT CHANGE UP
SODIUM SERPL-SCNC: 138 MMOL/L — SIGNIFICANT CHANGE UP (ref 135–145)
WBC # BLD: 5.93 K/UL — SIGNIFICANT CHANGE UP (ref 3.8–10.5)
WBC # FLD AUTO: 5.93 K/UL — SIGNIFICANT CHANGE UP (ref 3.8–10.5)

## 2024-03-14 PROCEDURE — 86850 RBC ANTIBODY SCREEN: CPT

## 2024-03-14 PROCEDURE — 86901 BLOOD TYPING SEROLOGIC RH(D): CPT

## 2024-03-14 PROCEDURE — 85027 COMPLETE CBC AUTOMATED: CPT

## 2024-03-14 PROCEDURE — G0463: CPT

## 2024-03-14 PROCEDURE — 80048 BASIC METABOLIC PNL TOTAL CA: CPT

## 2024-03-14 PROCEDURE — 86900 BLOOD TYPING SEROLOGIC ABO: CPT

## 2024-03-14 PROCEDURE — 36415 COLL VENOUS BLD VENIPUNCTURE: CPT

## 2024-03-14 NOTE — H&P PST ADULT - ASSESSMENT
NON UNION RIGHT HUMERUS FRACTURE : Scheduled for repair of Non-union right Humerus, Open reduction internal fixation right humerus, removal of hard ware right humerus on 03/21/24.    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    NON UNION RIGHT HUMERUS FRACTURE : Scheduled for repair of Non-union right Humerus, Open reduction internal fixation right humerus, removal of hard ware right humerus on 24.  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   CAPRINI SCORE [CLOT]    AGE RELATED RISK FACTORS                                                       MOBILITY RELATED FACTORS  [x ] Age 41-60 years                                            (1 Point)                  [ ] Bed rest                                                        (1 Point)  [ ] Age: 61-74 years                                           (2 Points)                 [ ] Plaster cast                                                   (2 Points)  [ ] Age= 75 years                                              (3 Points)                 [ ] Bed bound for more than 72 hours                 (2 Points)    DISEASE RELATED RISK FACTORS                                               GENDER SPECIFIC FACTORS  [ ] Edema in the lower extremities                       (1 Point)                  [ ] Pregnancy                                                     (1 Point)  [ ] Varicose veins                                               (1 Point)                  [ ] Post-partum < 6 weeks                                   (1 Point)             [ ] BMI > 25 Kg/m2                                            (1 Point)                  [ ] Hormonal therapy  or oral contraception          (1 Point)                 [ ] Sepsis (in the previous month)                        (1 Point)                  [ ] History of pregnancy complications                 (1 point)  [ ] Pneumonia or serious lung disease                                               [ ] Unexplained or recurrent                     (1 Point)           (in the previous month)                               (1 Point)  [ ] Abnormal pulmonary function test                     (1 Point)                 SURGERY RELATED RISK FACTORS  [ ] Acute myocardial infarction                              (1 Point)                 [ ]  Section                                             (1 Point)  [ ] Congestive heart failure (in the previous month)  (1 Point)               [ ] Minor surgery                                                  (1 Point)   [ ] Inflammatory bowel disease                             (1 Point)                 [ ] Arthroscopic surgery                                        (2 Points)  [ ] Central venous access                                      (2 Points)                [x ] General surgery lasting more than 45 minutes   (2 Points)       [ ] Stroke (in the previous month)                          (5 Points)               [ ] Elective arthroplasty                                         (5 Points)                                                                                                                                               HEMATOLOGY RELATED FACTORS                                                 TRAUMA RELATED RISK FACTORS  [ ] Prior episodes of VTE                                     (3 Points)                [ ] Fracture of the hip, pelvis, or leg                       (5 Points)  [ ] Positive family history for VTE                         (3 Points)                 [ ] Acute spinal cord injury (in the previous month)  (5 Points)  [ ] Prothrombin 65216 A                                     (3 Points)                 [ ] Paralysis  (less than 1 month)                             (5 Points)  [ ] Factor V Leiden                                             (3 Points)                  [ ] Multiple Trauma within 1 month                        (5 Points)  [ ] Lupus anticoagulants                                     (3 Points)                                                           [ ] Anticardiolipin antibodies                               (3 Points)                                                       [ ] High homocysteine in the blood                      (3 Points)                                             [ ] Other congenital or acquired thrombophilia      (3 Points)                                                [ ] Heparin induced thrombocytopenia                  (3 Points)                                          Total Score [     3     ]    Caprini Score 0 - 2:  Low Risk, No VTE Prophylaxis required for most patients, encourage ambulation  Caprini Score 3 - 6:  At Risk, pharmacologic VTE prophylaxis is indicated for most patients (in the absence of a contraindication)  Caprini Score Greater than or = 7:  High Risk, pharmacologic VTE prophylaxis is indicated for most patients (in the absence of a contraindication)

## 2024-03-14 NOTE — H&P PST ADULT - COMMENTS
pt with right shoulder and arm pain with limited ROM since surgery in 12/2023. Pt describes decreased range of motion of her shoulder and feels like something is impinging her shoulder. Denies any numbness or tingling.
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
## 2024-03-14 NOTE — H&P PST ADULT - MUSCULOSKELETAL COMMENTS
had surgery for right humerus fracture, no relief in pain right arm since surgery & unable to move my right arm

## 2024-03-14 NOTE — H&P PST ADULT - MUSCULOSKELETAL
details… RUE limited ROM/no joint swelling/no joint erythema/no joint warmth/no calf tenderness/decreased ROM/normal gait/strength 5/5 bilateral lower extremities

## 2024-03-14 NOTE — H&P PST ADULT - NSICDXPROCEDURE_GEN_ALL_CORE_FT
PROCEDURES:  Removal of orthopedic nail from right humerus 14-Mar-2024 06:58:05 Repair of Non-union right Humerus, Open reduction internal fixation right humerus, removal of hard ware right humerus on 03/21/24. Chu Howe
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
## 2024-03-14 NOTE — H&P PST ADULT - PROBLEM SELECTOR PLAN 1
Repair of Non-union right Humerus, Open reduction internal fixation right humerus, removal of hard ware right humerus on 03/21/24.  Preop cbc, bmp, T/s sent  Preop abo, UCG on DOS Repair of Non-union right Humerus, Open reduction internal fixation right humerus, removal of hard ware right humerus on 03/21/24.  Preop cbc, bmp, T/s sent  Preop ABO, UCG on DOS

## 2024-03-15 PROBLEM — Z87.81 PERSONAL HISTORY OF (HEALED) TRAUMATIC FRACTURE: Chronic | Status: ACTIVE | Noted: 2024-03-14

## 2024-03-15 PROBLEM — Z87.448 PERSONAL HISTORY OF OTHER DISEASES OF URINARY SYSTEM: Chronic | Status: ACTIVE | Noted: 2024-03-14

## 2024-03-19 ENCOUNTER — APPOINTMENT (OUTPATIENT)
Dept: INTERNAL MEDICINE | Facility: CLINIC | Age: 49
End: 2024-03-19
Payer: MEDICAID

## 2024-03-19 ENCOUNTER — OUTPATIENT (OUTPATIENT)
Dept: OUTPATIENT SERVICES | Facility: HOSPITAL | Age: 49
LOS: 1 days | End: 2024-03-19
Payer: MEDICAID

## 2024-03-19 ENCOUNTER — NON-APPOINTMENT (OUTPATIENT)
Age: 49
End: 2024-03-19

## 2024-03-19 VITALS
DIASTOLIC BLOOD PRESSURE: 64 MMHG | HEIGHT: 60 IN | WEIGHT: 127 LBS | BODY MASS INDEX: 24.94 KG/M2 | SYSTOLIC BLOOD PRESSURE: 100 MMHG | HEART RATE: 80 BPM | OXYGEN SATURATION: 98 %

## 2024-03-19 DIAGNOSIS — I10 ESSENTIAL (PRIMARY) HYPERTENSION: ICD-10-CM

## 2024-03-19 DIAGNOSIS — S42.309A UNSPECIFIED FRACTURE OF SHAFT OF HUMERUS, UNSPECIFIED ARM, INITIAL ENCOUNTER FOR CLOSED FRACTURE: Chronic | ICD-10-CM

## 2024-03-19 DIAGNOSIS — Z98.890 OTHER SPECIFIED POSTPROCEDURAL STATES: Chronic | ICD-10-CM

## 2024-03-19 DIAGNOSIS — Z01.818 ENCOUNTER FOR OTHER PREPROCEDURAL EXAMINATION: ICD-10-CM

## 2024-03-19 DIAGNOSIS — Z13.31 ENCOUNTER FOR SCREENING FOR DEPRESSION: ICD-10-CM

## 2024-03-19 DIAGNOSIS — Z00.00 ENCOUNTER FOR GENERAL ADULT MEDICAL EXAMINATION W/OUT ABNORMAL FINDINGS: ICD-10-CM

## 2024-03-19 PROCEDURE — G0463: CPT

## 2024-03-19 PROCEDURE — 99213 OFFICE O/P EST LOW 20 MIN: CPT | Mod: GC,25

## 2024-03-19 NOTE — PHYSICAL EXAM
[No Acute Distress] : no acute distress [Well Nourished] : well nourished [Well-Appearing] : well-appearing [Well Developed] : well developed [PERRL] : pupils equal round and reactive to light [Normal Sclera/Conjunctiva] : normal sclera/conjunctiva [EOMI] : extraocular movements intact [Normal Outer Ear/Nose] : the outer ears and nose were normal in appearance [No JVD] : no jugular venous distention [Normal Oropharynx] : the oropharynx was normal [Supple] : supple [No Lymphadenopathy] : no lymphadenopathy [Thyroid Normal, No Nodules] : the thyroid was normal and there were no nodules present [No Respiratory Distress] : no respiratory distress  [No Accessory Muscle Use] : no accessory muscle use [Clear to Auscultation] : lungs were clear to auscultation bilaterally [Regular Rhythm] : with a regular rhythm [Normal Rate] : normal rate  [Normal S1, S2] : normal S1 and S2 [No Murmur] : no murmur heard [No Carotid Bruits] : no carotid bruits [No Abdominal Bruit] : a ~M bruit was not heard ~T in the abdomen [No Varicosities] : no varicosities [Pedal Pulses Present] : the pedal pulses are present [No Edema] : there was no peripheral edema [No Extremity Clubbing/Cyanosis] : no extremity clubbing/cyanosis [No Palpable Aorta] : no palpable aorta [Non Tender] : non-tender [Soft] : abdomen soft [No Masses] : no abdominal mass palpated [Non-distended] : non-distended [No HSM] : no HSM [Normal Bowel Sounds] : normal bowel sounds [Normal Posterior Cervical Nodes] : no posterior cervical lymphadenopathy [No CVA Tenderness] : no CVA  tenderness [Normal Anterior Cervical Nodes] : no anterior cervical lymphadenopathy [No Spinal Tenderness] : no spinal tenderness [Grossly Normal Strength/Tone] : grossly normal strength/tone [No Joint Swelling] : no joint swelling [No Rash] : no rash [Coordination Grossly Intact] : coordination grossly intact [No Focal Deficits] : no focal deficits [Normal Gait] : normal gait [Deep Tendon Reflexes (DTR)] : deep tendon reflexes were 2+ and symmetric [Normal Affect] : the affect was normal [Normal Insight/Judgement] : insight and judgment were intact

## 2024-03-19 NOTE — PLAN
[FreeTextEntry1] : -Reviewed recent bloodwork and EKG with patient today. Answered all questions. Recommended to continue age-appropriate Cancer screening including mammography as appropriate ahead of next CPE.   Case d/w Dr. Chavez RTC for next CPE in ~4 mo
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
## 2024-03-20 ENCOUNTER — TRANSCRIPTION ENCOUNTER (OUTPATIENT)
Age: 49
End: 2024-03-20

## 2024-03-21 ENCOUNTER — TRANSCRIPTION ENCOUNTER (OUTPATIENT)
Age: 49
End: 2024-03-21

## 2024-03-21 ENCOUNTER — OUTPATIENT (OUTPATIENT)
Dept: INPATIENT UNIT | Facility: HOSPITAL | Age: 49
LOS: 1 days | End: 2024-03-21
Payer: MEDICAID

## 2024-03-21 ENCOUNTER — APPOINTMENT (OUTPATIENT)
Age: 49
End: 2024-03-21

## 2024-03-21 ENCOUNTER — RESULT REVIEW (OUTPATIENT)
Age: 49
End: 2024-03-21

## 2024-03-21 VITALS
RESPIRATION RATE: 15 BRPM | OXYGEN SATURATION: 96 % | DIASTOLIC BLOOD PRESSURE: 77 MMHG | HEART RATE: 84 BPM | SYSTOLIC BLOOD PRESSURE: 115 MMHG | TEMPERATURE: 98 F

## 2024-03-21 DIAGNOSIS — S42.351G: ICD-10-CM

## 2024-03-21 DIAGNOSIS — S42.309A UNSPECIFIED FRACTURE OF SHAFT OF HUMERUS, UNSPECIFIED ARM, INITIAL ENCOUNTER FOR CLOSED FRACTURE: Chronic | ICD-10-CM

## 2024-03-21 DIAGNOSIS — Z98.890 OTHER SPECIFIED POSTPROCEDURAL STATES: Chronic | ICD-10-CM

## 2024-03-21 LAB
ANION GAP SERPL CALC-SCNC: 13 MMOL/L — SIGNIFICANT CHANGE UP (ref 5–17)
ANION GAP SERPL CALC-SCNC: 14 MMOL/L — SIGNIFICANT CHANGE UP (ref 5–17)
BUN SERPL-MCNC: 11 MG/DL — SIGNIFICANT CHANGE UP (ref 7–23)
BUN SERPL-MCNC: 12 MG/DL — SIGNIFICANT CHANGE UP (ref 7–23)
CALCIUM SERPL-MCNC: 8.2 MG/DL — LOW (ref 8.4–10.5)
CALCIUM SERPL-MCNC: 9.3 MG/DL — SIGNIFICANT CHANGE UP (ref 8.4–10.5)
CHLORIDE SERPL-SCNC: 103 MMOL/L — SIGNIFICANT CHANGE UP (ref 96–108)
CHLORIDE SERPL-SCNC: 104 MMOL/L — SIGNIFICANT CHANGE UP (ref 96–108)
CO2 SERPL-SCNC: 22 MMOL/L — SIGNIFICANT CHANGE UP (ref 22–31)
CO2 SERPL-SCNC: 22 MMOL/L — SIGNIFICANT CHANGE UP (ref 22–31)
CREAT SERPL-MCNC: 0.46 MG/DL — LOW (ref 0.5–1.3)
CREAT SERPL-MCNC: 0.54 MG/DL — SIGNIFICANT CHANGE UP (ref 0.5–1.3)
CRP SERPL-MCNC: <3 MG/L — SIGNIFICANT CHANGE UP (ref 0–4)
EGFR: 112 ML/MIN/1.73M2 — SIGNIFICANT CHANGE UP
EGFR: 117 ML/MIN/1.73M2 — SIGNIFICANT CHANGE UP
GLUCOSE SERPL-MCNC: 178 MG/DL — HIGH (ref 70–99)
GLUCOSE SERPL-MCNC: 98 MG/DL — SIGNIFICANT CHANGE UP (ref 70–99)
HCG UR QL: NEGATIVE — SIGNIFICANT CHANGE UP
HCT VFR BLD CALC: 31.7 % — LOW (ref 34.5–45)
HCT VFR BLD CALC: 37.1 % — SIGNIFICANT CHANGE UP (ref 34.5–45)
HGB BLD-MCNC: 10.3 G/DL — LOW (ref 11.5–15.5)
HGB BLD-MCNC: 11.6 G/DL — SIGNIFICANT CHANGE UP (ref 11.5–15.5)
MCHC RBC-ENTMCNC: 24.9 PG — LOW (ref 27–34)
MCHC RBC-ENTMCNC: 25.5 PG — LOW (ref 27–34)
MCHC RBC-ENTMCNC: 31.3 GM/DL — LOW (ref 32–36)
MCHC RBC-ENTMCNC: 32.5 GM/DL — SIGNIFICANT CHANGE UP (ref 32–36)
MCV RBC AUTO: 78.5 FL — LOW (ref 80–100)
MCV RBC AUTO: 79.6 FL — LOW (ref 80–100)
NRBC # BLD: 0 /100 WBCS — SIGNIFICANT CHANGE UP (ref 0–0)
NRBC # BLD: 0 /100 WBCS — SIGNIFICANT CHANGE UP (ref 0–0)
PLATELET # BLD AUTO: 291 K/UL — SIGNIFICANT CHANGE UP (ref 150–400)
PLATELET # BLD AUTO: 333 K/UL — SIGNIFICANT CHANGE UP (ref 150–400)
POTASSIUM SERPL-MCNC: 3.7 MMOL/L — SIGNIFICANT CHANGE UP (ref 3.5–5.3)
POTASSIUM SERPL-MCNC: 3.7 MMOL/L — SIGNIFICANT CHANGE UP (ref 3.5–5.3)
POTASSIUM SERPL-SCNC: 3.7 MMOL/L — SIGNIFICANT CHANGE UP (ref 3.5–5.3)
POTASSIUM SERPL-SCNC: 3.7 MMOL/L — SIGNIFICANT CHANGE UP (ref 3.5–5.3)
RBC # BLD: 4.04 M/UL — SIGNIFICANT CHANGE UP (ref 3.8–5.2)
RBC # BLD: 4.66 M/UL — SIGNIFICANT CHANGE UP (ref 3.8–5.2)
RBC # FLD: 15.8 % — HIGH (ref 10.3–14.5)
RBC # FLD: 15.8 % — HIGH (ref 10.3–14.5)
SODIUM SERPL-SCNC: 139 MMOL/L — SIGNIFICANT CHANGE UP (ref 135–145)
SODIUM SERPL-SCNC: 139 MMOL/L — SIGNIFICANT CHANGE UP (ref 135–145)
WBC # BLD: 13.33 K/UL — HIGH (ref 3.8–10.5)
WBC # BLD: 6.24 K/UL — SIGNIFICANT CHANGE UP (ref 3.8–10.5)
WBC # FLD AUTO: 13.33 K/UL — HIGH (ref 3.8–10.5)
WBC # FLD AUTO: 6.24 K/UL — SIGNIFICANT CHANGE UP (ref 3.8–10.5)

## 2024-03-21 PROCEDURE — 24430 RPR NON/MAL HUMERUS WO GRAFT: CPT | Mod: RT

## 2024-03-21 PROCEDURE — 73060 X-RAY EXAM OF HUMERUS: CPT | Mod: 26,RT

## 2024-03-21 DEVICE — IMPLANTABLE DEVICE: Type: IMPLANTABLE DEVICE | Site: RIGHT | Status: FUNCTIONAL

## 2024-03-21 DEVICE — SCREW CORTEX LOKG S-T W/ T8 STARDRIVE RECESS 2.7X20MM: Type: IMPLANTABLE DEVICE | Site: RIGHT | Status: FUNCTIONAL

## 2024-03-21 DEVICE — SCREW CORT S-T 4.5X26MM: Type: IMPLANTABLE DEVICE | Site: RIGHT | Status: FUNCTIONAL

## 2024-03-21 DEVICE — SCREW CORTEX LOKG S-T W/ T8 STARDRIVE RECESS 2.7X22MM: Type: IMPLANTABLE DEVICE | Site: RIGHT | Status: FUNCTIONAL

## 2024-03-21 RX ORDER — LIDOCAINE HCL 20 MG/ML
0.2 VIAL (ML) INJECTION ONCE
Refills: 0 | Status: DISCONTINUED | OUTPATIENT
Start: 2024-03-21 | End: 2024-03-21

## 2024-03-21 RX ORDER — SENNA PLUS 8.6 MG/1
2 TABLET ORAL AT BEDTIME
Refills: 0 | Status: DISCONTINUED | OUTPATIENT
Start: 2024-03-21 | End: 2024-04-04

## 2024-03-21 RX ORDER — FAMOTIDINE 10 MG/ML
20 INJECTION INTRAVENOUS ONCE
Refills: 0 | Status: COMPLETED | OUTPATIENT
Start: 2024-03-21 | End: 2024-03-21

## 2024-03-21 RX ORDER — ONDANSETRON 8 MG/1
4 TABLET, FILM COATED ORAL ONCE
Refills: 0 | Status: COMPLETED | OUTPATIENT
Start: 2024-03-21 | End: 2024-03-21

## 2024-03-21 RX ORDER — ACETAMINOPHEN 500 MG
1000 TABLET ORAL ONCE
Refills: 0 | Status: COMPLETED | OUTPATIENT
Start: 2024-03-21 | End: 2024-03-21

## 2024-03-21 RX ORDER — OXYCODONE HYDROCHLORIDE 5 MG/1
10 TABLET ORAL EVERY 4 HOURS
Refills: 0 | Status: DISCONTINUED | OUTPATIENT
Start: 2024-03-21 | End: 2024-03-21

## 2024-03-21 RX ORDER — OXYCODONE HYDROCHLORIDE 5 MG/1
5 TABLET ORAL EVERY 4 HOURS
Refills: 0 | Status: DISCONTINUED | OUTPATIENT
Start: 2024-03-21 | End: 2024-03-22

## 2024-03-21 RX ORDER — SODIUM CHLORIDE 9 MG/ML
1000 INJECTION, SOLUTION INTRAVENOUS
Refills: 0 | Status: DISCONTINUED | OUTPATIENT
Start: 2024-03-21 | End: 2024-03-22

## 2024-03-21 RX ORDER — CEFAZOLIN SODIUM 1 G
2000 VIAL (EA) INJECTION ONCE
Refills: 0 | Status: COMPLETED | OUTPATIENT
Start: 2024-03-21 | End: 2024-03-21

## 2024-03-21 RX ORDER — SODIUM CHLORIDE 9 MG/ML
1000 INJECTION INTRAMUSCULAR; INTRAVENOUS; SUBCUTANEOUS
Refills: 0 | Status: DISCONTINUED | OUTPATIENT
Start: 2024-03-21 | End: 2024-04-04

## 2024-03-21 RX ORDER — CHLORHEXIDINE GLUCONATE 213 G/1000ML
1 SOLUTION TOPICAL ONCE
Refills: 0 | Status: DISCONTINUED | OUTPATIENT
Start: 2024-03-21 | End: 2024-03-21

## 2024-03-21 RX ORDER — MAGNESIUM HYDROXIDE 400 MG/1
30 TABLET, CHEWABLE ORAL DAILY
Refills: 0 | Status: DISCONTINUED | OUTPATIENT
Start: 2024-03-21 | End: 2024-04-04

## 2024-03-21 RX ORDER — DIPHENHYDRAMINE HCL 50 MG
12.5 CAPSULE ORAL ONCE
Refills: 0 | Status: COMPLETED | OUTPATIENT
Start: 2024-03-21 | End: 2024-03-21

## 2024-03-21 RX ORDER — HYDROMORPHONE HYDROCHLORIDE 2 MG/ML
0.5 INJECTION INTRAMUSCULAR; INTRAVENOUS; SUBCUTANEOUS
Refills: 0 | Status: DISCONTINUED | OUTPATIENT
Start: 2024-03-21 | End: 2024-03-21

## 2024-03-21 RX ORDER — SODIUM CHLORIDE 9 MG/ML
3 INJECTION INTRAMUSCULAR; INTRAVENOUS; SUBCUTANEOUS EVERY 8 HOURS
Refills: 0 | Status: DISCONTINUED | OUTPATIENT
Start: 2024-03-21 | End: 2024-03-21

## 2024-03-21 RX ORDER — ACETAMINOPHEN 500 MG
975 TABLET ORAL EVERY 8 HOURS
Refills: 0 | Status: DISCONTINUED | OUTPATIENT
Start: 2024-03-21 | End: 2024-04-04

## 2024-03-21 RX ADMIN — ONDANSETRON 4 MILLIGRAM(S): 8 TABLET, FILM COATED ORAL at 19:26

## 2024-03-21 RX ADMIN — HYDROMORPHONE HYDROCHLORIDE 0.5 MILLIGRAM(S): 2 INJECTION INTRAMUSCULAR; INTRAVENOUS; SUBCUTANEOUS at 17:37

## 2024-03-21 RX ADMIN — SODIUM CHLORIDE 125 MILLILITER(S): 9 INJECTION INTRAMUSCULAR; INTRAVENOUS; SUBCUTANEOUS at 18:43

## 2024-03-21 RX ADMIN — HYDROMORPHONE HYDROCHLORIDE 0.5 MILLIGRAM(S): 2 INJECTION INTRAMUSCULAR; INTRAVENOUS; SUBCUTANEOUS at 19:59

## 2024-03-21 RX ADMIN — OXYCODONE HYDROCHLORIDE 5 MILLIGRAM(S): 5 TABLET ORAL at 22:17

## 2024-03-21 RX ADMIN — Medication 400 MILLIGRAM(S): at 21:08

## 2024-03-21 RX ADMIN — Medication 100 MILLIGRAM(S): at 19:53

## 2024-03-21 RX ADMIN — HYDROMORPHONE HYDROCHLORIDE 0.5 MILLIGRAM(S): 2 INJECTION INTRAMUSCULAR; INTRAVENOUS; SUBCUTANEOUS at 16:50

## 2024-03-21 RX ADMIN — HYDROMORPHONE HYDROCHLORIDE 0.5 MILLIGRAM(S): 2 INJECTION INTRAMUSCULAR; INTRAVENOUS; SUBCUTANEOUS at 17:07

## 2024-03-21 RX ADMIN — SENNA PLUS 2 TABLET(S): 8.6 TABLET ORAL at 21:08

## 2024-03-21 RX ADMIN — OXYCODONE HYDROCHLORIDE 5 MILLIGRAM(S): 5 TABLET ORAL at 22:47

## 2024-03-21 RX ADMIN — HYDROMORPHONE HYDROCHLORIDE 0.5 MILLIGRAM(S): 2 INJECTION INTRAMUSCULAR; INTRAVENOUS; SUBCUTANEOUS at 20:28

## 2024-03-21 RX ADMIN — SODIUM CHLORIDE 125 MILLILITER(S): 9 INJECTION INTRAMUSCULAR; INTRAVENOUS; SUBCUTANEOUS at 16:51

## 2024-03-21 RX ADMIN — HYDROMORPHONE HYDROCHLORIDE 0.5 MILLIGRAM(S): 2 INJECTION INTRAMUSCULAR; INTRAVENOUS; SUBCUTANEOUS at 18:32

## 2024-03-21 RX ADMIN — HYDROMORPHONE HYDROCHLORIDE 0.5 MILLIGRAM(S): 2 INJECTION INTRAMUSCULAR; INTRAVENOUS; SUBCUTANEOUS at 18:45

## 2024-03-21 NOTE — ASU PATIENT PROFILE, ADULT - HISTORY OF COVID-19 VACCINATION
35 y/o F pt with no significant PMHx or PSHx c/o worsening gluteal abscess x3 weeks with associated nausea Pt came 12/28 for gluteal infected abscess and a PA completed I&D. PA did not pack the area. Pt was Rx'd Clindamycin by urgent care originally before coming to the ED.  Notes the abscess was getting better. Pt went to surgeon Jan 6 for possible removal. After they examined the area, the abscess grew again. Pt returned to the office 4 days ago for the worsening sx and they Rx'd doxycycline with no relief. Denies fever, chills or any other complaints.  Allergic to sulfa, penicillin.
Yes

## 2024-03-21 NOTE — ASU DISCHARGE PLAN (ADULT/PEDIATRIC) - CARE PROVIDER_API CALL
Zack Laboy  Orthopaedic Surgery  [address], [address] [place] [address]  Phone: [phone]  Fax: [phone]  Follow Up Time:

## 2024-03-21 NOTE — DISCHARGE NOTE PROVIDER - NSDCFUSCHEDAPPT_GEN_ALL_CORE_FT
[facility]  INTMED  [facility]   Scheduled Appointment: 04/18/2024

## 2024-03-21 NOTE — CHART NOTE - NSCHARTNOTEFT_GEN_A_CORE
Resting with complaints of R arm pain and soreness of 7/10.  No Chest Pain, SOB, N/V.    T(C): 36.2 (03-21-24 @ 16:20), Max: 36.7 (03-21-24 @ 09:30)  HR: 91 (03-21-24 @ 18:00) (84 - 103)  BP: 108/69 (03-21-24 @ 18:00) (99/54 - 115/77)  RR: 16 (03-21-24 @ 18:00) (12 - 16)  SpO2: 99% (03-21-24 @ 18:00) (96% - 100%)      Exam:  Alert and [place], No Acute Distress  Card: +S1/S2, RRR  Pulm: CTAB  Laterality: R Humerus  Gauze and surgical x2 c/d/i  Sling to brought bedside  Compartments soft and compressible  Normal  strength, 5/5 strength with Wrist Flex/Extension Finger Flexion and extension  SILT  + Radial pulse    Xray: IMPRESSION:  Metal plates with fixation screws affixed to right humeral shaft stabilizing underlying mid to distal shaft fracture. Residual offset linear fracture fragment anteriorly. Remaining anatomic alignment maintained.    Post surgical changes in overlying soft tissues.  Preserved shoulder and elbow joint spaces.    --- End of Report ---                            10.3   13.33 )-----------( 291      ( 21 Mar 2024 16:35 )             31.7    03-21    139  |  104  |  11  ----------------------------<  178<H>  3.7   |  22  |  0.54    Ca    8.2<L>      21 Mar 2024 16:35        A/P: 48y Female S/p R Humerus Fx ORIF Nonunion with repeat Humeral ORIF. VSS. NAD  -PT/OT-ROM as tolerated, 5lb weight restriction to RUE  -IS  -DVT PPx: Early OOB and ambulation  -Pain Control  -Continue Current Tx  -Dispo planning: PACU 23 hour observation for neuro checks.  DC tomorrow AM    Adebayo Napier PA-C  Orthopedic Surgery Team  Team Pager #[phone]

## 2024-03-21 NOTE — DISCHARGE NOTE PROVIDER - CARE PROVIDER_API CALL
Zack Laboy  Orthopaedic Surgery  [address], [address] [place] [address]  Phone: [phone]  Fax: [phone]  Follow Up Time: 1 week

## 2024-03-21 NOTE — ASU PATIENT PROFILE, ADULT - FALL HARM RISK - UNIVERSAL INTERVENTIONS
Bed in lowest position, wheels locked, appropriate side rails in place/Call bell, personal items and telephone in reach/Instruct patient to call for assistance before getting out of bed or chair/Non-slip footwear when patient is out of bed/Earp to call system/Physically safe environment - no spills, clutter or unnecessary equipment/Purposeful Proactive Rounding/Room/bathroom lighting operational, light cord in reach

## 2024-03-21 NOTE — DISCHARGE NOTE PROVIDER - NSDCFUADDINST_GEN_ALL_CORE_FT
WOUND CARE: Do not remove dressing until follow up. Keep dressing/incision clean, dry, and intact.    BATHING: Please do not submerge wound underwater. You may shower and/or sponge bathe. Do not scrub incisions or apply lotions.    ACTIVITY: Your weight bearing status is 5 pound weight limit in right arm. Range of motion of arm as tolerated. If you are taking narcotic pain medication (such as oxycodone), do NOT drive a car, operate machinery or make important decisions.    DIET: Return to your usual diet.    NOTIFY YOUR SURGEON IF: You have any bleeding that does not stop, any pus draining from your wound, any fever (over 100.4 F) or chills, persistent nausea/vomiting, persistent diarrhea, or if your pain is not controlled on your discharge pain medications.    PAIN CONTROL: Please take medication as prescribed. If you have been prescribed a narcotic (such as oxycodone), please be aware that narcotic pain medicine can cause extreme nausea and constipation. Drink plenty of water and take stool softener (Colace, Miralax) as needed. You can get them from your local pharmacy. If you have been prescribed a narcotic (such as oxycodone), please take for severe pain only. You can take up to 8 Tylenol 325 mg a day while taking oxycodone. Alternate between taking over the counter Ibuprofen and Tylenol so you are taking pain medication every 3-4 hours if your pain is severe.    FOLLOW-UP:  1. Follow-up with Dr. Laboy within 1-2 weeks of discharge.  Please call office for appointment

## 2024-03-21 NOTE — DISCHARGE NOTE PROVIDER - HOSPITAL COURSE
Patient presented to [facility] for scheduled surgery. Patient had surgery in [place] for a right humeral shaft fracture, which did not heal. Patient tolerated removal of hardware and open reduction and internal fixation of right humerus.    Patient brought to floor to recover overnight. Pt did well, and was optimized from an orthopedic standpoint for discharge on 3/22/24.

## 2024-03-21 NOTE — BRIEF OPERATIVE NOTE - NSICDXBRIEFPROCEDURE_GEN_ALL_CORE_FT
PROCEDURES:  Open reduction and internal fixation (ORIF) of fracture nonunion of humerus 21-Mar-2024 15:33:25  Ivory Whiteside

## 2024-03-21 NOTE — DISCHARGE NOTE PROVIDER - NSDCCPCAREPLAN_GEN_ALL_CORE_FT
PRINCIPAL DISCHARGE DIAGNOSIS  Diagnosis: Fracture of shaft of humerus, closed  Assessment and Plan of Treatment: nonunion

## 2024-03-21 NOTE — DISCHARGE NOTE PROVIDER - NSDCCPTREATMENT_GEN_ALL_CORE_FT
PRINCIPAL PROCEDURE  Procedure: Open reduction and internal fixation (ORIF) of fracture nonunion of humerus  Findings and Treatment:

## 2024-03-21 NOTE — ASU PATIENT PROFILE, ADULT - AS SC BRADEN SENSORY
Pt arrives via BLS from home complaining of 10/10 chest pain since Sunday with worsening pain today. Given 325 chewable Asprin in route with no relief. Secondary cc of generalized body aches.    (4) no impairment

## 2024-03-22 ENCOUNTER — TRANSCRIPTION ENCOUNTER (OUTPATIENT)
Age: 49
End: 2024-03-22

## 2024-03-22 VITALS
TEMPERATURE: 98 F | DIASTOLIC BLOOD PRESSURE: 66 MMHG | OXYGEN SATURATION: 100 % | SYSTOLIC BLOOD PRESSURE: 108 MMHG | HEART RATE: 87 BPM | RESPIRATION RATE: 18 BRPM

## 2024-03-22 LAB
ANION GAP SERPL CALC-SCNC: 9 MMOL/L — SIGNIFICANT CHANGE UP (ref 5–17)
BASOPHILS # BLD AUTO: 0.03 K/UL — SIGNIFICANT CHANGE UP (ref 0–0.2)
BASOPHILS NFR BLD AUTO: 0.3 % — SIGNIFICANT CHANGE UP (ref 0–2)
BUN SERPL-MCNC: 6 MG/DL — LOW (ref 7–23)
CALCIUM SERPL-MCNC: 8.1 MG/DL — LOW (ref 8.4–10.5)
CHLORIDE SERPL-SCNC: 107 MMOL/L — SIGNIFICANT CHANGE UP (ref 96–108)
CO2 SERPL-SCNC: 24 MMOL/L — SIGNIFICANT CHANGE UP (ref 22–31)
CREAT SERPL-MCNC: 0.44 MG/DL — LOW (ref 0.5–1.3)
EGFR: 118 ML/MIN/1.73M2 — SIGNIFICANT CHANGE UP
EOSINOPHIL # BLD AUTO: 0.02 K/UL — SIGNIFICANT CHANGE UP (ref 0–0.5)
EOSINOPHIL NFR BLD AUTO: 0.2 % — SIGNIFICANT CHANGE UP (ref 0–6)
GLUCOSE SERPL-MCNC: 122 MG/DL — HIGH (ref 70–99)
HCT VFR BLD CALC: 28.9 % — LOW (ref 34.5–45)
HGB BLD-MCNC: 9.2 G/DL — LOW (ref 11.5–15.5)
IMM GRANULOCYTES NFR BLD AUTO: 0.4 % — SIGNIFICANT CHANGE UP (ref 0–0.9)
LYMPHOCYTES # BLD AUTO: 18.9 % — SIGNIFICANT CHANGE UP (ref 13–44)
LYMPHOCYTES # BLD AUTO: 2.05 K/UL — SIGNIFICANT CHANGE UP (ref 1–3.3)
MCHC RBC-ENTMCNC: 25.6 PG — LOW (ref 27–34)
MCHC RBC-ENTMCNC: 31.8 GM/DL — LOW (ref 32–36)
MCV RBC AUTO: 80.3 FL — SIGNIFICANT CHANGE UP (ref 80–100)
MONOCYTES # BLD AUTO: 1.16 K/UL — HIGH (ref 0–0.9)
MONOCYTES NFR BLD AUTO: 10.7 % — SIGNIFICANT CHANGE UP (ref 2–14)
NEUTROPHILS # BLD AUTO: 7.52 K/UL — HIGH (ref 1.8–7.4)
NEUTROPHILS NFR BLD AUTO: 69.5 % — SIGNIFICANT CHANGE UP (ref 43–77)
NRBC # BLD: 0 /100 WBCS — SIGNIFICANT CHANGE UP (ref 0–0)
PLATELET # BLD AUTO: 249 K/UL — SIGNIFICANT CHANGE UP (ref 150–400)
POTASSIUM SERPL-MCNC: 4.2 MMOL/L — SIGNIFICANT CHANGE UP (ref 3.5–5.3)
POTASSIUM SERPL-SCNC: 4.2 MMOL/L — SIGNIFICANT CHANGE UP (ref 3.5–5.3)
RBC # BLD: 3.6 M/UL — LOW (ref 3.8–5.2)
RBC # FLD: 15.9 % — HIGH (ref 10.3–14.5)
SODIUM SERPL-SCNC: 140 MMOL/L — SIGNIFICANT CHANGE UP (ref 135–145)
WBC # BLD: 10.82 K/UL — HIGH (ref 3.8–10.5)
WBC # FLD AUTO: 10.82 K/UL — HIGH (ref 3.8–10.5)

## 2024-03-22 PROCEDURE — 97165 OT EVAL LOW COMPLEX 30 MIN: CPT

## 2024-03-22 PROCEDURE — 85025 COMPLETE CBC W/AUTO DIFF WBC: CPT

## 2024-03-22 PROCEDURE — 81025 URINE PREGNANCY TEST: CPT

## 2024-03-22 PROCEDURE — 36415 COLL VENOUS BLD VENIPUNCTURE: CPT

## 2024-03-22 PROCEDURE — 85027 COMPLETE CBC AUTOMATED: CPT

## 2024-03-22 PROCEDURE — 76000 FLUOROSCOPY <1 HR PHYS/QHP: CPT

## 2024-03-22 PROCEDURE — 80048 BASIC METABOLIC PNL TOTAL CA: CPT

## 2024-03-22 PROCEDURE — 24430 RPR NON/MAL HUMERUS WO GRAFT: CPT | Mod: RT

## 2024-03-22 PROCEDURE — C1769: CPT

## 2024-03-22 PROCEDURE — 86140 C-REACTIVE PROTEIN: CPT

## 2024-03-22 PROCEDURE — C1889: CPT

## 2024-03-22 PROCEDURE — C9399: CPT

## 2024-03-22 PROCEDURE — C1713: CPT

## 2024-03-22 PROCEDURE — 73060 X-RAY EXAM OF HUMERUS: CPT

## 2024-03-22 RX ORDER — IBUPROFEN 200 MG
1 TABLET ORAL
Qty: 0 | Refills: 0 | DISCHARGE

## 2024-03-22 RX ORDER — ACETAMINOPHEN 500 MG
3 TABLET ORAL
Qty: 0 | Refills: 0 | DISCHARGE
Start: 2024-03-22

## 2024-03-22 RX ORDER — IBUPROFEN 200 MG
600 TABLET ORAL ONCE
Refills: 0 | Status: COMPLETED | OUTPATIENT
Start: 2024-03-22 | End: 2024-03-22

## 2024-03-22 RX ORDER — SENNA PLUS 8.6 MG/1
2 TABLET ORAL
Qty: 0 | Refills: 0 | DISCHARGE
Start: 2024-03-22

## 2024-03-22 RX ORDER — OXYCODONE HYDROCHLORIDE 5 MG/1
1 TABLET ORAL
Qty: 40 | Refills: 0
Start: 2024-03-22

## 2024-03-22 RX ADMIN — Medication 975 MILLIGRAM(S): at 06:30

## 2024-03-22 RX ADMIN — Medication 975 MILLIGRAM(S): at 06:00

## 2024-03-22 RX ADMIN — OXYCODONE HYDROCHLORIDE 5 MILLIGRAM(S): 5 TABLET ORAL at 03:00

## 2024-03-22 RX ADMIN — Medication 975 MILLIGRAM(S): at 13:11

## 2024-03-22 RX ADMIN — Medication 600 MILLIGRAM(S): at 11:01

## 2024-03-22 RX ADMIN — OXYCODONE HYDROCHLORIDE 5 MILLIGRAM(S): 5 TABLET ORAL at 02:19

## 2024-03-22 RX ADMIN — OXYCODONE HYDROCHLORIDE 5 MILLIGRAM(S): 5 TABLET ORAL at 08:58

## 2024-03-22 RX ADMIN — OXYCODONE HYDROCHLORIDE 5 MILLIGRAM(S): 5 TABLET ORAL at 08:28

## 2024-03-22 RX ADMIN — Medication 975 MILLIGRAM(S): at 13:41

## 2024-03-22 RX ADMIN — Medication 600 MILLIGRAM(S): at 11:31

## 2024-03-22 NOTE — OCCUPATIONAL THERAPY INITIAL EVALUATION ADULT - PERTINENT HX OF CURRENT PROBLEM, REHAB EVAL
48y Female S/p R Humerus Fx ORIF Nonunion with repeat Humeral ORIF.  Xray: IMPRESSION:  Metal plates with fixation screws affixed to right humeral shaft stabilizing underlying mid to distal shaft fracture. Residual offset linear fracture fragment anteriorly. Remaining anatomic alignment maintained.

## 2024-03-22 NOTE — DISCHARGE NOTE NURSING/CASE MANAGEMENT/SOCIAL WORK - PATIENT PORTAL LINK FT
You can access the FollowMyHealth Patient Portal offered by [facility] by registering at the following website: [email or web address]. By joining [email or web address]’s FollowMyHealth portal, you will also be able to view your health information using other applications (apps) compatible with our system.

## 2024-03-22 NOTE — OCCUPATIONAL THERAPY INITIAL EVALUATION ADULT - RANGE OF MOTION EXAMINATION, UPPER EXTREMITY
R wrist/digits WFL, elbow flexion 70*, extension -20, shoulder flexion 20*/Left UE Active ROM was WFL (within functional limits)

## 2024-03-22 NOTE — PROGRESS NOTE ADULT - SUBJECTIVE AND OBJECTIVE BOX
Orthopedic Surgery      Pt seen and examined. Pt recovering well overnight in PACU after removal of hardware and ORIF of right humerus. Pt reports pain is well controlled. Pt denies any fever/chills/chest pain/nausea/vomiting. Denies numbness or tingling.    ICU Vital Signs Last 24 Hrs  T(C): 36.3 (21 Mar 2024 23:00), Max: 36.7 (21 Mar 2024 09:30)  T(F): 97.3 (21 Mar 2024 23:00), Max: 98.1 (21 Mar 2024 09:30)  HR: 88 (22 Mar 2024 04:00) (81 - 103)  BP: 92/51 (22 Mar 2024 04:00) (92/51 - 115/77)  BP(mean): 66 (22 Mar 2024 04:00) (66 - 84)  ABP: --  ABP(mean): --  RR: 16 (22 Mar 2024 04:00) (12 - 18)  SpO2: 100% (22 Mar 2024 04:00) (95% - 100%)          Physical exam:   Gen: NAD, alert and oriented  Resp: Unlabored breathing  RUE: Skin intact, no ecchymosis,   Dressing is clean dry intact        Motor Intact C5-T1       SILT Median, Ulnar, Radial, axillary, and LABC distributions       Able to fire AIN, PIN, Ulnar, and radial nerves       Radial Pulse palpable       soft compartments      LABS:                        9.2    10.82 )-----------( 249      ( 22 Mar 2024 05:54 )             28.9     03-22    140  |  107  |  6<L>  ----------------------------<  122<H>  4.2   |  24  |  0.44<L>    Ca    8.1<L>      22 Mar 2024 05:54        Urinalysis Basic - ( 22 Mar 2024 05:54 )    Color: x / Appearance: x / SG: x / pH: x  Gluc: 122 mg/dL / Ketone: x  / Bili: x / Urobili: x   Blood: x / Protein: x / Nitrite: x   Leuk Esterase: x / RBC: x / WBC x   Sq Epi: x / Non Sq Epi: x / Bacteria: x              Assessment/Plan:   A/P: 48y Female pod1 removal of hardware, ORIF of right humerus. Says pain is well controlled this morning  -PT/OT-ROM as tolerated, 5lb weight restriction to RUE  -IS  -DVT PPx: Early out of bed and ambulation  -Pain Control  -Continue Current Tx  -Dispo planning: Plan for dispo home this morning    Aureliano Marie PGY-1    For any questions please contact the on call orthopedic surgery team, please do not reach out via teams.  [facility] pager: [phone]  [facility] pager: [phone]  [facility] pager: [phone]

## 2024-03-25 DIAGNOSIS — Z13.31 ENCOUNTER FOR SCREENING FOR DEPRESSION: ICD-10-CM

## 2024-03-25 DIAGNOSIS — Z01.818 ENCOUNTER FOR OTHER PREPROCEDURAL EXAMINATION: ICD-10-CM

## 2024-03-25 DIAGNOSIS — Z00.00 ENCOUNTER FOR GENERAL ADULT MEDICAL EXAMINATION WITHOUT ABNORMAL FINDINGS: ICD-10-CM

## 2024-04-02 ENCOUNTER — APPOINTMENT (OUTPATIENT)
Dept: ORTHOPEDIC SURGERY | Facility: CLINIC | Age: 49
End: 2024-04-02
Payer: MEDICAID

## 2024-04-02 VITALS — HEIGHT: 60 IN | BODY MASS INDEX: 24.94 KG/M2 | WEIGHT: 127 LBS

## 2024-04-02 PROCEDURE — 73060 X-RAY EXAM OF HUMERUS: CPT

## 2024-04-02 PROCEDURE — 99024 POSTOP FOLLOW-UP VISIT: CPT

## 2024-04-02 RX ORDER — DOCUSATE SODIUM 100 MG/1
100 CAPSULE ORAL TWICE DAILY
Qty: 20 | Refills: 0 | Status: ACTIVE | COMMUNITY
Start: 2024-04-02 | End: 1900-01-01

## 2024-04-02 RX ORDER — TRAMADOL HYDROCHLORIDE 50 MG/1
50 TABLET, COATED ORAL
Qty: 28 | Refills: 0 | Status: ACTIVE | COMMUNITY
Start: 2024-04-02 | End: 1900-01-01

## 2024-04-02 NOTE — PHYSICAL EXAM
[de-identified] : Right upper extremity - Incisions healing appropriately no erythema - Shoulder range of motion 90 degrees of forward flexion 90 degrees abduction - Patient to make thumbs up okay sign cross second digit extend wrist and fingers and wrist Patient intact to light touch radial ulnar median and axillary nerve distribution - Hand warm well-perfused radial pulse palpable [de-identified] : 2 views of the right humerus obtained today hardware intact reduction maintained.
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
## 2024-04-18 ENCOUNTER — APPOINTMENT (OUTPATIENT)
Dept: INTERNAL MEDICINE | Facility: CLINIC | Age: 49
End: 2024-04-18

## 2024-04-29 ENCOUNTER — APPOINTMENT (OUTPATIENT)
Age: 49
End: 2024-04-29
Payer: MEDICAID

## 2024-04-29 VITALS
HEIGHT: 60 IN | SYSTOLIC BLOOD PRESSURE: 102 MMHG | OXYGEN SATURATION: 96 % | DIASTOLIC BLOOD PRESSURE: 68 MMHG | HEART RATE: 101 BPM | WEIGHT: 127 LBS | BODY MASS INDEX: 24.94 KG/M2

## 2024-04-29 PROCEDURE — 99024 POSTOP FOLLOW-UP VISIT: CPT

## 2024-04-29 PROCEDURE — 73060 X-RAY EXAM OF HUMERUS: CPT

## 2024-05-20 ENCOUNTER — APPOINTMENT (OUTPATIENT)
Age: 49
End: 2024-05-20
Payer: MEDICAID

## 2024-05-20 VITALS
SYSTOLIC BLOOD PRESSURE: 107 MMHG | HEART RATE: 84 BPM | WEIGHT: 127 LBS | OXYGEN SATURATION: 97 % | BODY MASS INDEX: 24.94 KG/M2 | HEIGHT: 60 IN | DIASTOLIC BLOOD PRESSURE: 71 MMHG

## 2024-05-20 DIAGNOSIS — S42.351G: ICD-10-CM

## 2024-05-20 PROCEDURE — 73060 X-RAY EXAM OF HUMERUS: CPT

## 2024-05-20 PROCEDURE — 99024 POSTOP FOLLOW-UP VISIT: CPT

## 2024-05-24 ENCOUNTER — APPOINTMENT (OUTPATIENT)
Dept: INTERNAL MEDICINE | Facility: CLINIC | Age: 49
End: 2024-05-24
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
## 2024-06-05 NOTE — PHYSICAL EXAM
[de-identified] : Right upper extremity - Incisions healing appropriately no erythema - Shoulder range of motion 110 degrees of forward flexion 110 degrees abduction - Patient to make thumbs up okay sign cross second digit extend wrist and fingers and wrist Patient intact to light touch radial ulnar median and axillary nerve distribution - Hand warm well-perfused radial pulse palpable [de-identified] : 2 views of the right humerus obtained today hardware intact reduction maintained.

## 2024-06-17 PROBLEM — S42.351G: Status: ACTIVE | Noted: 2024-03-12

## 2024-06-17 NOTE — PHYSICAL EXAM
[de-identified] : Right upper extremity -Incision healed - Shoulder range of motion 130 degrees of forward flexion 130 degrees abduction - Patient to make thumbs up okay sign cross second digit extend wrist and fingers and wrist Patient intact to light touch radial ulnar median and axillary nerve distribution - Hand warm well-perfused radial pulse palpable [de-identified] : 2 views of the right humerus obtained today hardware intact reduction maintained.  Bony callus formation present

## 2024-06-17 NOTE — ASSESSMENT
[FreeTextEntry1] : 48 year old female RHD with a right humeral shaft fracture with delayed union and painful hardware right shoulderStatus post repair of nonunion March 21, 2024 -Incisions healed - pain controlled on OTC NSAIDS -Continue weight-bear as tolerated.  Continue strengthening and range of motion exercises physical therapy -Follow-up in 6 weeks
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
## 2024-06-23 ENCOUNTER — NON-APPOINTMENT (OUTPATIENT)
Age: 49
End: 2024-06-23

## 2024-06-24 ENCOUNTER — APPOINTMENT (OUTPATIENT)
Age: 49
End: 2024-06-24

## 2024-06-24 VITALS
WEIGHT: 125 LBS | DIASTOLIC BLOOD PRESSURE: 71 MMHG | HEIGHT: 60 IN | HEART RATE: 98 BPM | BODY MASS INDEX: 24.54 KG/M2 | SYSTOLIC BLOOD PRESSURE: 106 MMHG | OXYGEN SATURATION: 96 %

## 2024-06-24 DIAGNOSIS — S46.011D STRAIN OF MUSCLE(S) AND TENDON(S) OF THE ROTATOR CUFF OF RIGHT SHOULDER, SUBSEQUENT ENCOUNTER: ICD-10-CM

## 2024-06-24 PROCEDURE — 73060 X-RAY EXAM OF HUMERUS: CPT

## 2024-06-24 PROCEDURE — 99213 OFFICE O/P EST LOW 20 MIN: CPT | Mod: 25

## 2024-06-26 ENCOUNTER — APPOINTMENT (OUTPATIENT)
Dept: INTERNAL MEDICINE | Facility: CLINIC | Age: 49
End: 2024-06-26
Payer: MEDICAID

## 2024-06-26 VITALS
WEIGHT: 127 LBS | HEIGHT: 60 IN | BODY MASS INDEX: 24.94 KG/M2 | DIASTOLIC BLOOD PRESSURE: 70 MMHG | HEART RATE: 93 BPM | SYSTOLIC BLOOD PRESSURE: 100 MMHG | OXYGEN SATURATION: 98 %

## 2024-06-26 DIAGNOSIS — B37.31 ACUTE CANDIDIASIS OF VULVA AND VAGINA: ICD-10-CM

## 2024-06-26 DIAGNOSIS — B35.1 TINEA UNGUIUM: ICD-10-CM

## 2024-06-26 PROCEDURE — 99213 OFFICE O/P EST LOW 20 MIN: CPT | Mod: GE

## 2024-06-26 RX ORDER — CLOTRIMAZOLE 2 G/100G
2 CREAM VAGINAL
Qty: 1 | Refills: 0 | Status: ACTIVE | COMMUNITY
Start: 2024-06-26 | End: 1900-01-01

## 2024-07-10 ENCOUNTER — APPOINTMENT (OUTPATIENT)
Age: 49
End: 2024-07-10

## 2024-07-10 VITALS
DIASTOLIC BLOOD PRESSURE: 67 MMHG | SYSTOLIC BLOOD PRESSURE: 96 MMHG | WEIGHT: 125 LBS | HEART RATE: 81 BPM | OXYGEN SATURATION: 97 % | BODY MASS INDEX: 24.54 KG/M2 | HEIGHT: 60 IN

## 2024-07-10 PROCEDURE — 99213 OFFICE O/P EST LOW 20 MIN: CPT

## 2024-08-12 ENCOUNTER — APPOINTMENT (OUTPATIENT)
Age: 49
End: 2024-08-12
Payer: MEDICAID

## 2024-08-12 VITALS
HEIGHT: 60 IN | WEIGHT: 125 LBS | HEART RATE: 101 BPM | SYSTOLIC BLOOD PRESSURE: 111 MMHG | BODY MASS INDEX: 24.54 KG/M2 | OXYGEN SATURATION: 96 % | DIASTOLIC BLOOD PRESSURE: 77 MMHG

## 2024-08-12 PROCEDURE — 99213 OFFICE O/P EST LOW 20 MIN: CPT | Mod: 25

## 2024-08-12 PROCEDURE — 73060 X-RAY EXAM OF HUMERUS: CPT

## 2024-08-12 NOTE — PHYSICAL EXAM
[de-identified] : Right upper extremity -Incision healed - Shoulder range of motion 140 degrees of forward flexion - Patient to make thumbs up okay sign cross second digit extend wrist and fingers and wrist Patient intact to light touch radial ulnar median and axillary nerve distribution - Hand warm well-perfused radial pulse palpable - Flexion extension of the elbow 4 out of 5 abduction 4+ out of 5 forward flexion 4+ out of 5 [de-identified] : 2 views of the right humerus obtained today hardware intact reduction maintained.  Fracture appears almost fully healed small portion where there is bridging bone but not fully healed
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
## 2024-08-12 NOTE — ASSESSMENT
[FreeTextEntry1] : 48 year old female RHD with a right humeral shaft fracture with delayed union and painful hardware right shoulderStatus post repair of nonunion March 21, 2024 -Incisions healed - pain controlled on OTC NSAIDS -Continue weight-bear as tolerated.  Continue strengthening and range of motion exercises physical therapy -Patient may return to activities as tolerated -Follow-up in 3 months

## 2024-08-19 ENCOUNTER — APPOINTMENT (OUTPATIENT)
Age: 49
End: 2024-08-19

## 2024-11-13 ENCOUNTER — LABORATORY RESULT (OUTPATIENT)
Age: 49
End: 2024-11-13

## 2024-11-13 ENCOUNTER — OUTPATIENT (OUTPATIENT)
Dept: OUTPATIENT SERVICES | Facility: HOSPITAL | Age: 49
LOS: 1 days | End: 2024-11-13
Payer: MEDICAID

## 2024-11-13 ENCOUNTER — APPOINTMENT (OUTPATIENT)
Dept: OBGYN | Facility: CLINIC | Age: 49
End: 2024-11-13
Payer: MEDICAID

## 2024-11-13 ENCOUNTER — APPOINTMENT (OUTPATIENT)
Dept: INTERNAL MEDICINE | Facility: CLINIC | Age: 49
End: 2024-11-13
Payer: MEDICAID

## 2024-11-13 ENCOUNTER — OUTPATIENT (OUTPATIENT)
Dept: OUTPATIENT SERVICES | Facility: HOSPITAL | Age: 49
LOS: 1 days | End: 2024-11-13

## 2024-11-13 VITALS
HEART RATE: 94 BPM | OXYGEN SATURATION: 98 % | DIASTOLIC BLOOD PRESSURE: 82 MMHG | BODY MASS INDEX: 23.75 KG/M2 | SYSTOLIC BLOOD PRESSURE: 118 MMHG | HEIGHT: 60 IN | WEIGHT: 121 LBS

## 2024-11-13 DIAGNOSIS — Z98.890 OTHER SPECIFIED POSTPROCEDURAL STATES: Chronic | ICD-10-CM

## 2024-11-13 DIAGNOSIS — N76.0 ACUTE VAGINITIS: ICD-10-CM

## 2024-11-13 DIAGNOSIS — N95.1 MENOPAUSAL AND FEMALE CLIMACTERIC STATES: ICD-10-CM

## 2024-11-13 DIAGNOSIS — N89.8 OTHER SPECIFIED NONINFLAMMATORY DISORDERS OF VAGINA: ICD-10-CM

## 2024-11-13 DIAGNOSIS — B37.31 ACUTE CANDIDIASIS OF VULVA AND VAGINA: ICD-10-CM

## 2024-11-13 DIAGNOSIS — S42.309A UNSPECIFIED FRACTURE OF SHAFT OF HUMERUS, UNSPECIFIED ARM, INITIAL ENCOUNTER FOR CLOSED FRACTURE: Chronic | ICD-10-CM

## 2024-11-13 DIAGNOSIS — I10 ESSENTIAL (PRIMARY) HYPERTENSION: ICD-10-CM

## 2024-11-13 DIAGNOSIS — N39.0 URINARY TRACT INFECTION, SITE NOT SPECIFIED: ICD-10-CM

## 2024-11-13 PROCEDURE — 87591 N.GONORRHOEAE DNA AMP PROB: CPT

## 2024-11-13 PROCEDURE — G0463: CPT

## 2024-11-13 PROCEDURE — 81513 NFCT DS BV RNA VAG FLU ALG: CPT

## 2024-11-13 PROCEDURE — 87389 HIV-1 AG W/HIV-1&-2 AB AG IA: CPT

## 2024-11-13 PROCEDURE — 87661 TRICHOMONAS VAGINALIS AMPLIF: CPT

## 2024-11-13 PROCEDURE — 87481 CANDIDA DNA AMP PROBE: CPT

## 2024-11-13 PROCEDURE — 87491 CHLMYD TRACH DNA AMP PROBE: CPT

## 2024-11-13 PROCEDURE — 99213 OFFICE O/P EST LOW 20 MIN: CPT | Mod: GE

## 2024-11-13 PROCEDURE — 86780 TREPONEMA PALLIDUM: CPT

## 2024-11-13 PROCEDURE — 87086 URINE CULTURE/COLONY COUNT: CPT

## 2024-11-13 PROCEDURE — 99213 OFFICE O/P EST LOW 20 MIN: CPT

## 2024-11-13 RX ORDER — TERCONAZOLE 8 MG/G
0.8 CREAM VAGINAL
Qty: 1 | Refills: 0 | Status: ACTIVE | COMMUNITY
Start: 2024-11-13 | End: 1900-01-01

## 2024-11-13 RX ORDER — CIPROFLOXACIN HYDROCHLORIDE 500 MG/1
500 TABLET, FILM COATED ORAL
Qty: 10 | Refills: 0 | Status: ACTIVE | COMMUNITY
Start: 2024-11-13 | End: 1900-01-01

## 2024-11-13 RX ORDER — ESTRADIOL 0.1 MG/G
0.1 CREAM VAGINAL
Qty: 1 | Refills: 0 | Status: ACTIVE | COMMUNITY
Start: 2024-11-13 | End: 1900-01-01

## 2024-11-14 LAB
BV BACTERIA RRNA VAG QL NAA+PROBE: SIGNIFICANT CHANGE UP
C GLABRATA RNA VAG QL NAA+PROBE: SIGNIFICANT CHANGE UP
C TRACH RRNA SPEC QL NAA+PROBE: SIGNIFICANT CHANGE UP
CANDIDA RRNA VAG QL PROBE: SIGNIFICANT CHANGE UP
N GONORRHOEA RRNA SPEC QL NAA+PROBE: SIGNIFICANT CHANGE UP
T VAGINALIS RRNA SPEC QL NAA+PROBE: SIGNIFICANT CHANGE UP

## 2024-11-15 LAB
APPEARANCE: CLEAR
BACTERIA UR CULT: NORMAL
BILIRUBIN URINE: NEGATIVE
BLOOD URINE: NEGATIVE
COLOR: YELLOW
GLUCOSE QUALITATIVE U: NEGATIVE MG/DL
HIV1+2 AB SPEC QL IA.RAPID: NONREACTIVE
KETONES URINE: NEGATIVE MG/DL
LEUKOCYTE ESTERASE URINE: NEGATIVE
N GONORRHOEA RRNA SPEC QL NAA+PROBE: NOT DETECTED
NITRITE URINE: NEGATIVE
PH URINE: 6
PROTEIN URINE: NEGATIVE MG/DL
SOURCE AMPLIFICATION: NORMAL
SPECIFIC GRAVITY URINE: 1.02
T PALLIDUM AB SER QL IA: NEGATIVE
UROBILINOGEN URINE: 0.2 MG/DL

## 2024-11-18 ENCOUNTER — APPOINTMENT (OUTPATIENT)
Age: 49
End: 2024-11-18

## 2024-11-18 VITALS
OXYGEN SATURATION: 97 % | DIASTOLIC BLOOD PRESSURE: 71 MMHG | HEIGHT: 60 IN | SYSTOLIC BLOOD PRESSURE: 99 MMHG | HEART RATE: 97 BPM | BODY MASS INDEX: 23.75 KG/M2 | WEIGHT: 121 LBS

## 2024-11-18 DIAGNOSIS — M22.42 CHONDROMALACIA PATELLAE, LEFT KNEE: ICD-10-CM

## 2024-11-18 PROCEDURE — 73060 X-RAY EXAM OF HUMERUS: CPT

## 2024-11-18 PROCEDURE — 99213 OFFICE O/P EST LOW 20 MIN: CPT | Mod: 25

## 2024-11-25 ENCOUNTER — APPOINTMENT (OUTPATIENT)
Age: 49
End: 2024-11-25
Payer: MEDICAID

## 2024-11-25 VITALS
BODY MASS INDEX: 23.16 KG/M2 | OXYGEN SATURATION: 98 % | SYSTOLIC BLOOD PRESSURE: 106 MMHG | WEIGHT: 118 LBS | HEART RATE: 106 BPM | HEIGHT: 60 IN | DIASTOLIC BLOOD PRESSURE: 70 MMHG

## 2024-11-25 DIAGNOSIS — M67.911 UNSPECIFIED DISORDER OF SYNOVIUM AND TENDON, RIGHT SHOULDER: ICD-10-CM

## 2024-11-25 DIAGNOSIS — M75.51 BURSITIS OF RIGHT SHOULDER: ICD-10-CM

## 2024-11-25 PROCEDURE — 20611 DRAIN/INJ JOINT/BURSA W/US: CPT | Mod: RT

## 2024-11-25 PROCEDURE — 99204 OFFICE O/P NEW MOD 45 MIN: CPT | Mod: 25

## 2024-11-26 DIAGNOSIS — N89.8 OTHER SPECIFIED NONINFLAMMATORY DISORDERS OF VAGINA: ICD-10-CM

## 2024-11-26 DIAGNOSIS — N95.1 MENOPAUSAL AND FEMALE CLIMACTERIC STATES: ICD-10-CM

## 2024-11-26 DIAGNOSIS — N39.0 URINARY TRACT INFECTION, SITE NOT SPECIFIED: ICD-10-CM

## 2024-11-26 DIAGNOSIS — B37.31 ACUTE CANDIDIASIS OF VULVA AND VAGINA: ICD-10-CM
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
## 2024-12-23 ENCOUNTER — LABORATORY RESULT (OUTPATIENT)
Age: 49
End: 2024-12-23

## 2024-12-23 ENCOUNTER — OUTPATIENT (OUTPATIENT)
Dept: OUTPATIENT SERVICES | Facility: HOSPITAL | Age: 49
LOS: 1 days | End: 2024-12-23
Payer: MEDICAID

## 2024-12-23 ENCOUNTER — APPOINTMENT (OUTPATIENT)
Dept: INTERNAL MEDICINE | Facility: CLINIC | Age: 49
End: 2024-12-23
Payer: MEDICAID

## 2024-12-23 VITALS
OXYGEN SATURATION: 98 % | HEIGHT: 60 IN | WEIGHT: 122 LBS | HEART RATE: 80 BPM | SYSTOLIC BLOOD PRESSURE: 108 MMHG | BODY MASS INDEX: 23.95 KG/M2 | DIASTOLIC BLOOD PRESSURE: 70 MMHG

## 2024-12-23 DIAGNOSIS — Z98.890 OTHER SPECIFIED POSTPROCEDURAL STATES: Chronic | ICD-10-CM

## 2024-12-23 DIAGNOSIS — Z00.00 ENCOUNTER FOR GENERAL ADULT MEDICAL EXAMINATION W/OUT ABNORMAL FINDINGS: ICD-10-CM

## 2024-12-23 DIAGNOSIS — S42.309A UNSPECIFIED FRACTURE OF SHAFT OF HUMERUS, UNSPECIFIED ARM, INITIAL ENCOUNTER FOR CLOSED FRACTURE: Chronic | ICD-10-CM

## 2024-12-23 DIAGNOSIS — I10 ESSENTIAL (PRIMARY) HYPERTENSION: ICD-10-CM

## 2024-12-23 DIAGNOSIS — R09.81 NASAL CONGESTION: ICD-10-CM

## 2024-12-23 LAB
ALBUMIN SERPL ELPH-MCNC: 4.4 G/DL — SIGNIFICANT CHANGE UP (ref 3.3–5)
ALP SERPL-CCNC: 74 U/L — SIGNIFICANT CHANGE UP (ref 40–120)
ALT FLD-CCNC: 8 U/L — LOW (ref 10–45)
ANION GAP SERPL CALC-SCNC: 11 MMOL/L — SIGNIFICANT CHANGE UP (ref 5–17)
AST SERPL-CCNC: 21 U/L — SIGNIFICANT CHANGE UP (ref 10–40)
BILIRUB SERPL-MCNC: 0.5 MG/DL — SIGNIFICANT CHANGE UP (ref 0.2–1.2)
BUN SERPL-MCNC: 9 MG/DL — SIGNIFICANT CHANGE UP (ref 7–23)
CALCIUM SERPL-MCNC: 9 MG/DL — SIGNIFICANT CHANGE UP (ref 8.4–10.5)
CHLORIDE SERPL-SCNC: 105 MMOL/L — SIGNIFICANT CHANGE UP (ref 96–108)
CHOLEST SERPL-MCNC: 187 MG/DL — SIGNIFICANT CHANGE UP
CO2 SERPL-SCNC: 24 MMOL/L — SIGNIFICANT CHANGE UP (ref 22–31)
CREAT SERPL-MCNC: 0.45 MG/DL — LOW (ref 0.5–1.3)
EGFR: 116 ML/MIN/1.73M2 — SIGNIFICANT CHANGE UP
GLUCOSE SERPL-MCNC: 87 MG/DL — SIGNIFICANT CHANGE UP (ref 70–99)
HCT VFR BLD CALC: 34.4 % — LOW (ref 34.5–45)
HDLC SERPL-MCNC: 70 MG/DL — SIGNIFICANT CHANGE UP
HGB BLD-MCNC: 10.4 G/DL — LOW (ref 11.5–15.5)
LIPID PNL WITH DIRECT LDL SERPL: 100 MG/DL — HIGH
MCHC RBC-ENTMCNC: 22.6 PG — LOW (ref 27–34)
MCHC RBC-ENTMCNC: 30.2 G/DL — LOW (ref 32–36)
MCV RBC AUTO: 74.6 FL — LOW (ref 80–100)
NON HDL CHOLESTEROL: 117 MG/DL — SIGNIFICANT CHANGE UP
PLATELET # BLD AUTO: 416 K/UL — HIGH (ref 150–400)
POTASSIUM SERPL-MCNC: 4.6 MMOL/L — SIGNIFICANT CHANGE UP (ref 3.5–5.3)
POTASSIUM SERPL-SCNC: 4.6 MMOL/L — SIGNIFICANT CHANGE UP (ref 3.5–5.3)
PROT SERPL-MCNC: 7.4 G/DL — SIGNIFICANT CHANGE UP (ref 6–8.3)
RBC # BLD: 4.61 M/UL — SIGNIFICANT CHANGE UP (ref 3.8–5.2)
RBC # FLD: 17.7 % — HIGH (ref 10.3–14.5)
SODIUM SERPL-SCNC: 140 MMOL/L — SIGNIFICANT CHANGE UP (ref 135–145)
TRIGL SERPL-MCNC: 95 MG/DL — SIGNIFICANT CHANGE UP
WBC # BLD: 6.32 K/UL — SIGNIFICANT CHANGE UP (ref 3.8–10.5)
WBC # FLD AUTO: 6.32 K/UL — SIGNIFICANT CHANGE UP (ref 3.8–10.5)

## 2024-12-23 PROCEDURE — 82607 VITAMIN B-12: CPT

## 2024-12-23 PROCEDURE — 83540 ASSAY OF IRON: CPT

## 2024-12-23 PROCEDURE — G0463: CPT

## 2024-12-23 PROCEDURE — 85027 COMPLETE CBC AUTOMATED: CPT

## 2024-12-23 PROCEDURE — 36415 COLL VENOUS BLD VENIPUNCTURE: CPT

## 2024-12-23 PROCEDURE — 83550 IRON BINDING TEST: CPT

## 2024-12-23 PROCEDURE — 80061 LIPID PANEL: CPT

## 2024-12-23 PROCEDURE — 99396 PREV VISIT EST AGE 40-64: CPT | Mod: GC,25

## 2024-12-23 PROCEDURE — 80053 COMPREHEN METABOLIC PANEL: CPT

## 2024-12-23 PROCEDURE — 82746 ASSAY OF FOLIC ACID SERUM: CPT

## 2024-12-24 PROBLEM — R09.81 NASAL CONGESTION: Status: ACTIVE | Noted: 2024-12-24

## 2024-12-24 LAB
FOLATE SERPL-MCNC: 6.3 NG/ML — SIGNIFICANT CHANGE UP
IRON SATN MFR SERPL: 18 UG/DL — LOW (ref 30–160)
IRON SATN MFR SERPL: 4 % — LOW (ref 14–50)
TIBC SERPL-MCNC: 476 UG/DL — HIGH (ref 220–430)
UIBC SERPL-MCNC: 458 UG/DL — HIGH (ref 110–370)
VIT B12 SERPL-MCNC: 313 PG/ML — SIGNIFICANT CHANGE UP (ref 232–1245)

## 2024-12-26 DIAGNOSIS — E61.1 IRON DEFICIENCY: ICD-10-CM

## 2024-12-26 RX ORDER — CHLORHEXIDINE GLUCONATE 4 %
325 (65 FE) LIQUID (ML) TOPICAL
Qty: 15 | Refills: 6 | Status: ACTIVE | COMMUNITY
Start: 2024-12-26 | End: 1900-01-01

## 2024-12-30 DIAGNOSIS — Z00.00 ENCOUNTER FOR GENERAL ADULT MEDICAL EXAMINATION WITHOUT ABNORMAL FINDINGS: ICD-10-CM

## 2024-12-30 DIAGNOSIS — R09.81 NASAL CONGESTION: ICD-10-CM

## 2025-01-07 NOTE — ED ADULT NURSE REASSESSMENT NOTE - NS ED NURSE REASSESS COMMENT FT1
[id number] Break coverage for RN pt returned form us vs documented pending further orders Ángel Mr Rosanna Graham is a 75 y.o. 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 Found to have near whiteout of right hemithorax. Presentation consistent with sepsis and AHRF iso RSV and aspiration. Upgraded to MICU after RRT for pressor requirements and bradycardia. Downgraded to medical floor on 12/25. Endocrine consulted for adrenal insufficiency management.

## 2025-01-08 NOTE — OCCUPATIONAL THERAPY INITIAL EVALUATION ADULT - LEVEL OF INDEPENDENCE: SIT/STAND, REHAB EVAL
We doing another Pap and HPV today.  If it still shows HPV I will refer you to [facility] for colposcopy since we have already done 2 colposcopies here I would like another opinion.    We will increase phentermine to 37.5 mg.    We will help set a virtual visit in 3 months for med check.    I am not saying you have to get to a BMI of 25 but that would be a weight of 151.  BMI of 26.1 I believe is a weight of 161.    I am ordering a abdominal ultrasound because of the left upper abdominal pain.  Radiology should call you but if they do not call [phone] to set that up.    I am glad you see dermatology for full body skin checks.    Next mammogram is due at 40, sooner if you or your doctor  have any concerns.    I overlooked the controlled substance agreement and urine tox today.  Left patient a message and she can do both of those when she comes in for a well-child check with one of her children in the near future.      Health Maintenance   Topic Date Due    HEPATITIS B IMMUNIZATION (1 of 3 - 19+ 3-dose series) Done    ADVANCE CARE PLANNING  Declined    YEARLY PREVENTIVE VISIT  Today    LIPID  Today    PAP FOLLOW-UP  Today    HPV FOLLOW-UP  Today    ANNUAL REVIEW OF HM ORDERS  Today    GLUCOSE  Today    DTAP/TDAP/TD IMMUNIZATION (5 - Td or Tdap) 03/04/2031    RSV VACCINE (1 - 1-dose 75+ series) Age 60    HEPATITIS C SCREENING  Completed    HIV SCREENING  Completed    PHQ-2 (once per calendar year)  Completed    INFLUENZA VACCINE  Completed    HPV IMMUNIZATION  Completed    MENINGITIS IMMUNIZATION  Completed    COVID-19 Vaccine  Completed    Pneumococcal Vaccine: Pediatrics (0 to 5 Years) and At-Risk Patients (6 to 49 Years)  Age 50    RSV MONOCLONAL ANTIBODY  Aged Out    PAP  Today      100 90 100 independent

## 2025-02-19 ENCOUNTER — APPOINTMENT (OUTPATIENT)
Age: 50
End: 2025-02-19
Payer: MEDICAID

## 2025-02-19 VITALS
DIASTOLIC BLOOD PRESSURE: 72 MMHG | HEART RATE: 94 BPM | HEIGHT: 60 IN | OXYGEN SATURATION: 96 % | SYSTOLIC BLOOD PRESSURE: 114 MMHG | BODY MASS INDEX: 23.36 KG/M2 | WEIGHT: 119 LBS

## 2025-02-19 DIAGNOSIS — S42.351G: ICD-10-CM

## 2025-02-19 PROCEDURE — 99213 OFFICE O/P EST LOW 20 MIN: CPT | Mod: 25

## 2025-02-19 PROCEDURE — 73060 X-RAY EXAM OF HUMERUS: CPT

## 2025-02-26 ENCOUNTER — APPOINTMENT (OUTPATIENT)
Age: 50
End: 2025-02-26
Payer: MEDICAID

## 2025-02-26 VITALS
BODY MASS INDEX: 23.36 KG/M2 | SYSTOLIC BLOOD PRESSURE: 109 MMHG | OXYGEN SATURATION: 97 % | HEIGHT: 60 IN | DIASTOLIC BLOOD PRESSURE: 73 MMHG | WEIGHT: 119 LBS | HEART RATE: 79 BPM

## 2025-02-26 DIAGNOSIS — M75.51 BURSITIS OF RIGHT SHOULDER: ICD-10-CM

## 2025-02-26 DIAGNOSIS — M77.11 LATERAL EPICONDYLITIS, RIGHT ELBOW: ICD-10-CM

## 2025-02-26 DIAGNOSIS — M67.911 UNSPECIFIED DISORDER OF SYNOVIUM AND TENDON, RIGHT SHOULDER: ICD-10-CM

## 2025-02-26 PROCEDURE — 99214 OFFICE O/P EST MOD 30 MIN: CPT

## 2025-02-26 RX ORDER — DICLOFENAC SODIUM 10 MG/G
1 GEL TOPICAL
Qty: 1 | Refills: 1 | Status: ACTIVE | COMMUNITY
Start: 2025-02-26 | End: 1900-01-01

## 2025-04-04 NOTE — ED CDU PROVIDER SUBSEQUENT DAY NOTE - WET READ LAUNCH FT
Progress Note  Ophthalmology Service    Date: 04/04/2025    Assessment:     # Staphylococcal Scalded Skin Syndrome  - Patient developed generalized desquamation of the skin ~8pm on 4/1/25  - Afebrile but recent illness (~2/2025) for viral and bacterial pneumonia  - Risk factor review significant for only young age  - Initial anterior segment examination without conjunctival injection, epithelial sloughing, conjunctival membranes, or symblepharon. No uptake on fluorescein staining of the cornea and conjunctiva  - No signs of secondary infection or keratitis at this time  - Discussed warning signs and symptoms for which to contact us, including increased pain, redness, discharge, or worsening vision    Plan:     Patient's skin findings with marked improvement and patient less restless from pain today. Continues to have no signs of conjunctival injection, epithelial sloughing, conjunctival membranes, or symblepharon. No uptake on fluorescein staining of the cornea and conjunctiva. Formally discussed with cornea service on 4/3/25. Low concern for ocular involvement at this time given re-assuring physical exam and likely site of infection being G-tube with surrounding cellulitis (culture growing many S. Aureus).    Recommendations:  - Currently no obvious signs of ocular involvement  - No acute intervention per ophthalmology at this time  - Continue aggressive lubrication with preservative-free artificial tears QID   - Continue erythromycin ointment on the eye and eyelid TID    Ophthalmology will continue to follow Trey Puente peripherally. If there are further questions, please call the on call ophthalmology resident.     Barrett Lai MD   [employer] [facility] PGY2  04/04/2025  9:36 AM    Subjective:     History of Present Illness: Trey Puente is a 7 m.o. male with no past ocular history who presented to [facility] for progressive hypoxia. At around 8pm on 4/1/25, skin was noted peeling off of  the patient's arms and back during a hypoxic episode. ICU and dermatology evaluation suggestive of scalded skin syndrome and not SJS. Ophthalmology is being consulted to evaluate for ocular involvement of scalded skin syndrome. Patient is unable to participate in an interview given age and extreme pain from scaled skin syndrome. Per parents, no ocular symptoms or history.     POcularHx: See initial HPI.     Current eye gtts: See initial HPI.     Family Hx: See initial HPI. Denies family history of glaucoma, macular degeneration, or blindness. family history includes Cancer in his maternal grandmother; Hyperlipidemia in his maternal grandfather and paternal grandfather; No Known Problems in his brother, brother, father, mother, paternal grandmother, sister, sister, sister, and sister.     PMHx:  has a past medical history of ASD (atrial septal defect), Developmental delay, Heart murmur, Hypoxia (2024), PDA (patent ductus arteriosus), Poor weight gain in infant (2024), Tricuspid regurgitation, congenital, Trisomy 21, and VSD (ventricular septal defect).     PSurgHx:  has a past surgical history that includes Direct laryngobronchoscopy (N/A, 2024); Combined right and retrograde left heart catheterization for congenital heart defect (N/A, 2024); angiogram, pulmonary, pediatric (2024); ventriculogram, left, pediatric (2024); aortogram, pediatric (2024); echocardiogram,transesophageal (2024); repair, tricuspid valve, without ring insertion (N/A, 2024); Patent ductus arterious ligation (N/A, 2024); Pulmonary artery banding (N/A, 2024); and insertion, gastrostomy tube, laparoscopic (N/A, 2024).     Home Medications:   Prior to Admission medications    Medication Sig Start Date End Date Taking? Authorizing Provider   amoxicillin-pot clavulanate 250-62.5 mg/5ml (AUGMENTIN) 250-62.5 mg/5 mL suspension Take 0.8 mLs by mouth every 8 (eight) hours. 1/13/25   Jerry  Karla THOMPSON MD   chlorothiazide (DIURIL) 50 mg/mL 0.7 mLs (35 mg total) by Per G Tube route once daily. 1/10/25 3/11/25  Miriam Dennis MD   enalapril 1 mg/mL Susp liquid (PEDS) 0.6 mLs (0.6 mg total) by Per G Tube route 2 (two) times a day. 1/10/25 4/10/25  Miriam Dennis MD   ergocalciferol, vitamin D2, (VITAMIN D ORAL) Take by mouth.    Provider, Historical   esomeprazole magnesium (NEXIUM PACKET) 5 mg suspension (PEDS) Mix the 5 mg packet with 5 mL of water. Take by mouth daily. 12/4/24   Sabra Orozco MD   furosemide 10 mg/mL 0.7 mLs (7 mg total) by Per G Tube route every 8 (eight) hours. 1/10/25   Miriam Dennis MD   sodium chloride 1,000 mg TbSO oral tablet Crush 1 tablet (1,000 mg total), dissolve in water, and administer by Per G Tube route once daily. 11/14/24   Estevan Nolen MD        Medications this encounter:    acetaminophen  15 mg/kg (Dosing Weight) Per G Tube Q6H    artificial tears  1 drop Both Eyes QID    budesonide  0.5 mg Nebulization Q12H    ceFAZolin (Ancef) IV (PEDS and ADULTS)  50 mg/kg (Dosing Weight) Intravenous Q8H    chlorothiazide  25 mg Per G Tube TID    erythromycin   Both Eyes TID    esomeprazole magnesium  5 mg Per G Tube Before breakfast    furosemide  10 mg Per G Tube TID    Lactobacillus rhamnosus GG  1 capsule Per G Tube Daily    linezolid  10 mg/kg (Dosing Weight) Intravenous Q8H    mupirocin   Topical (Top) TID    oxyCODONE  0.1 mg/kg (Dosing Weight) Per G Tube Q6H    potassium chloride  7.5 mEq Intravenous Once    sodium chloride  1,000 mg Per G Tube Daily       Allergies: has no known allergies.     Social:  reports that he has never smoked. He has never been exposed to tobacco smoke. He has never used smokeless tobacco.     ROS: As per HPI    Objective:     Ocular examination/Dilated fundus examination:  Base Eye Exam       Visual Acuity (Snellen - Linear)         Right Left    Dist sc Fixes and follows Fixes and follows              Tonometry  (Palpation, 11:11 AM)         Right Left    Pressure STP STP              Neuro/Psych       Mood/Affect: Sleepy                  Slit Lamp and Fundus Exam       External Exam         Right Left    External Trace periorbital erythrema and swelling, no skin peeling noted Trace periorbital erythrema and swelling, no skin peeling noted              Slit Lamp Exam         Right Left    Lids/Lashes Normal Normal    Conjunctiva/Sclera White and quiet White and quiet    Cornea Clear, no uptake Clear, no uptake    Anterior Chamber Deep and formed Deep and formed    Iris Round and reactive Round and reactive    Lens Difficult view but appears present Difficult view but appears present    Anterior Vitreous Difficult view but appears present Difficult view but appears present                         There are no Wet Read(s) to document.

## 2025-05-08 NOTE — ED PROVIDER NOTE - CLINICAL SUMMARY MEDICAL DECISION MAKING FREE TEXT BOX
done KALIE SAMPSON is a 46 year old female with a pmhx of UTI, complicated by bacteremia in August 2021, who presents to ED for evaluation of dysuria and white vaginal discharge she believes is a yeast infection x 1 week duration also w/ some back pain and had "pain in her legs" yesterday that resolved. VSS. PE sig for suprapubic tenderness. Obtain Labs, Urine Studies.

## 2025-05-09 NOTE — ED ADULT NURSE NOTE - OBJECTIVE STATEMENT
normal... Pt is a 47 yo F who came to the ED amb c/o lower abd pain; Also has pain and burning on urination x 6 days along with vaginal itching and whitish discharge; back pain x 4 days; Pt is a 45 yo F who came to the ED amb c/o lower abd pain; Also has pain and burning on urination x 6 days along with vaginal itching and whitish discharge; back pain x 4 days; Denies cp/sob/palpitations, no fever/chills; has lower abd pain, tender on deep palpation, no cva tenderness. A/O x3.

## 2025-07-07 NOTE — H&P ADULT - PROBLEM/PLAN-1
DISPLAY PLAN FREE TEXT
Damon Norris  Orthopaedic Surgery  [address], [address]  [place] [address]  Phone: [phone]  Fax: [phone]  Follow Up Time:

## (undated) DEVICE — DRILL BIT SYNTHES ORTHO 2.0MM W DEPTH MARK QC 110MM

## (undated) DEVICE — BLADE SCALPEL SAFETYLOCK #10

## (undated) DEVICE — SOL IRR POUR NS 0.9% 500ML

## (undated) DEVICE — DRAPE U 47X51" NON STERILE

## (undated) DEVICE — DRILL BIT SYNTHES ORTHO QC 3.2X145MM

## (undated) DEVICE — GLV 8 PROTEXIS (BLUE)

## (undated) DEVICE — DRSG DERMABOND PRINEO 60CM

## (undated) DEVICE — GLV 8.5 PROTEXIS (WHITE)

## (undated) DEVICE — DRAPE IOBAN 23" X 23"

## (undated) DEVICE — POSITIONER FOAM EGG CRATE ULNAR 2PCS (PINK)

## (undated) DEVICE — DRAPE 3/4 SHEET W REINFORCEMENT 56X77"

## (undated) DEVICE — PACK EXTREMITY

## (undated) DEVICE — SUT POLYSORB 2-0 30" GS-21 UNDYED

## (undated) DEVICE — SUT POLYSORB 0 30" GS-21 UNDYED

## (undated) DEVICE — GLV 7.5 PROTEXIS (WHITE)

## (undated) DEVICE — WARMING BLANKET LOWER ADULT

## (undated) DEVICE — DRSG CURITY GAUZE SPONGE 4 X 4" 12-PLY

## (undated) DEVICE — VENODYNE/SCD SLEEVE CALF MEDIUM

## (undated) DEVICE — DRSG STOCKINETTE IMPERVIOUS MED

## (undated) DEVICE — DRSG TEGADERM 4X4.75"

## (undated) DEVICE — NDL HYPO SAFE 22G X 1.5" (BLACK)

## (undated) DEVICE — DRAPE C ARM UNIVERSAL

## (undated) DEVICE — SUT RETRIEVER S&N LAVENDER

## (undated) DEVICE — SYR LUER LOK 20CC

## (undated) DEVICE — ELCTR BOVIE PENCIL SMOKE EVACUATION

## (undated) DEVICE — GLV 8 PROTEXIS (WHITE)

## (undated) DEVICE — SOL IRR POUR H2O 250ML

## (undated) DEVICE — POSITIONER FOAM HEADREST (PINK)

## (undated) DEVICE — SUT FIBERWIRE #2 38" STRAND 1 BLUE T-5 TAPER

## (undated) DEVICE — DRAPE SPLIT SHEET 77" X 108"